# Patient Record
Sex: MALE | Race: BLACK OR AFRICAN AMERICAN | Employment: OTHER | URBAN - METROPOLITAN AREA
[De-identification: names, ages, dates, MRNs, and addresses within clinical notes are randomized per-mention and may not be internally consistent; named-entity substitution may affect disease eponyms.]

---

## 2020-03-15 ENCOUNTER — HOSPITAL ENCOUNTER (INPATIENT)
Age: 50
LOS: 3 days | Discharge: HOME | DRG: 506 | End: 2020-03-18
Attending: EMERGENCY MEDICINE | Admitting: FAMILY MEDICINE
Payer: MEDICARE

## 2020-03-15 ENCOUNTER — APPOINTMENT (OUTPATIENT)
Dept: GENERAL RADIOLOGY | Age: 50
DRG: 506 | End: 2020-03-15
Attending: EMERGENCY MEDICINE
Payer: MEDICARE

## 2020-03-15 ENCOUNTER — APPOINTMENT (OUTPATIENT)
Dept: MRI IMAGING | Age: 50
DRG: 506 | End: 2020-03-15
Attending: FAMILY MEDICINE
Payer: MEDICARE

## 2020-03-15 DIAGNOSIS — L03.012 FELON OF FINGER OF LEFT HAND: Primary | ICD-10-CM

## 2020-03-15 DIAGNOSIS — L08.9 LACERATION OF LEFT THUMB WITH INFECTION, INITIAL ENCOUNTER: ICD-10-CM

## 2020-03-15 DIAGNOSIS — M86.142 OTHER ACUTE OSTEOMYELITIS OF LEFT HAND (HCC): ICD-10-CM

## 2020-03-15 DIAGNOSIS — S61.012A LACERATION OF LEFT THUMB WITH INFECTION, INITIAL ENCOUNTER: ICD-10-CM

## 2020-03-15 PROBLEM — E78.5 HLD (HYPERLIPIDEMIA): Status: ACTIVE | Noted: 2020-03-15

## 2020-03-15 PROBLEM — F20.9 SCHIZOPHRENIA (HCC): Status: ACTIVE | Noted: 2020-03-15

## 2020-03-15 PROBLEM — B19.10 HBV (HEPATITIS B VIRUS) INFECTION: Status: ACTIVE | Noted: 2020-03-15

## 2020-03-15 LAB
ANION GAP SERPL CALC-SCNC: 5 MMOL/L (ref 7–16)
BASOPHILS # BLD: 0.1 K/UL (ref 0–0.2)
BASOPHILS NFR BLD: 1 % (ref 0–2)
BUN SERPL-MCNC: 8 MG/DL (ref 6–23)
CALCIUM SERPL-MCNC: 8.6 MG/DL (ref 8.3–10.4)
CHLORIDE SERPL-SCNC: 101 MMOL/L (ref 98–107)
CO2 SERPL-SCNC: 28 MMOL/L (ref 21–32)
CREAT SERPL-MCNC: 0.55 MG/DL (ref 0.8–1.5)
DIFFERENTIAL METHOD BLD: ABNORMAL
EOSINOPHIL # BLD: 0.1 K/UL (ref 0–0.8)
EOSINOPHIL NFR BLD: 1 % (ref 0.5–7.8)
ERYTHROCYTE [DISTWIDTH] IN BLOOD BY AUTOMATED COUNT: 14.5 % (ref 11.9–14.6)
GLUCOSE BLD STRIP.AUTO-MCNC: 210 MG/DL (ref 65–100)
GLUCOSE BLD STRIP.AUTO-MCNC: 326 MG/DL (ref 65–100)
GLUCOSE SERPL-MCNC: 133 MG/DL (ref 65–100)
HCT VFR BLD AUTO: 47.4 % (ref 41.1–50.3)
HGB BLD-MCNC: 16.3 G/DL (ref 13.6–17.2)
IMM GRANULOCYTES # BLD AUTO: 0.2 K/UL (ref 0–0.5)
IMM GRANULOCYTES NFR BLD AUTO: 2 % (ref 0–5)
INR PPP: 1
LACTATE SERPL-SCNC: 1.5 MMOL/L (ref 0.4–2)
LYMPHOCYTES # BLD: 3 K/UL (ref 0.5–4.6)
LYMPHOCYTES NFR BLD: 29 % (ref 13–44)
MCH RBC QN AUTO: 28.3 PG (ref 26.1–32.9)
MCHC RBC AUTO-ENTMCNC: 34.4 G/DL (ref 31.4–35)
MCV RBC AUTO: 82.3 FL (ref 79.6–97.8)
MONOCYTES # BLD: 1.1 K/UL (ref 0.1–1.3)
MONOCYTES NFR BLD: 11 % (ref 4–12)
NEUTS SEG # BLD: 5.8 K/UL (ref 1.7–8.2)
NEUTS SEG NFR BLD: 57 % (ref 43–78)
NRBC # BLD: 0 K/UL (ref 0–0.2)
PLATELET # BLD AUTO: 236 K/UL (ref 150–450)
PMV BLD AUTO: 10.4 FL (ref 9.4–12.3)
POTASSIUM SERPL-SCNC: 3.8 MMOL/L (ref 3.5–5.1)
PROTHROMBIN TIME: 13.4 SEC (ref 12–14.7)
RBC # BLD AUTO: 5.76 M/UL (ref 4.23–5.6)
SODIUM SERPL-SCNC: 134 MMOL/L (ref 136–145)
WBC # BLD AUTO: 10.2 K/UL (ref 4.3–11.1)

## 2020-03-15 PROCEDURE — 65270000029 HC RM PRIVATE

## 2020-03-15 PROCEDURE — 99283 EMERGENCY DEPT VISIT LOW MDM: CPT

## 2020-03-15 PROCEDURE — 87040 BLOOD CULTURE FOR BACTERIA: CPT

## 2020-03-15 PROCEDURE — 74011250636 HC RX REV CODE- 250/636: Performed by: EMERGENCY MEDICINE

## 2020-03-15 PROCEDURE — 80048 BASIC METABOLIC PNL TOTAL CA: CPT

## 2020-03-15 PROCEDURE — 87536 HIV-1 QUANT&REVRSE TRNSCRPJ: CPT

## 2020-03-15 PROCEDURE — 85025 COMPLETE CBC W/AUTO DIFF WBC: CPT

## 2020-03-15 PROCEDURE — 82962 GLUCOSE BLOOD TEST: CPT

## 2020-03-15 PROCEDURE — 96374 THER/PROPH/DIAG INJ IV PUSH: CPT

## 2020-03-15 PROCEDURE — 74011250636 HC RX REV CODE- 250/636

## 2020-03-15 PROCEDURE — 87186 SC STD MICRODIL/AGAR DIL: CPT

## 2020-03-15 PROCEDURE — 73140 X-RAY EXAM OF FINGER(S): CPT

## 2020-03-15 PROCEDURE — 83605 ASSAY OF LACTIC ACID: CPT

## 2020-03-15 PROCEDURE — 73223 MRI JOINT UPR EXTR W/O&W/DYE: CPT

## 2020-03-15 PROCEDURE — A9575 INJ GADOTERATE MEGLUMI 0.1ML: HCPCS

## 2020-03-15 PROCEDURE — 36415 COLL VENOUS BLD VENIPUNCTURE: CPT

## 2020-03-15 PROCEDURE — 85610 PROTHROMBIN TIME: CPT

## 2020-03-15 PROCEDURE — 74011250637 HC RX REV CODE- 250/637: Performed by: NURSE PRACTITIONER

## 2020-03-15 PROCEDURE — 87205 SMEAR GRAM STAIN: CPT

## 2020-03-15 PROCEDURE — 74011636637 HC RX REV CODE- 636/637: Performed by: FAMILY MEDICINE

## 2020-03-15 PROCEDURE — 87077 CULTURE AEROBIC IDENTIFY: CPT

## 2020-03-15 RX ORDER — OXCARBAZEPINE 300 MG/1
300 TABLET, FILM COATED ORAL EVERY 12 HOURS
COMMUNITY

## 2020-03-15 RX ORDER — INSULIN GLARGINE 100 [IU]/ML
20 INJECTION, SOLUTION SUBCUTANEOUS
Status: DISCONTINUED | OUTPATIENT
Start: 2020-03-15 | End: 2020-03-18 | Stop reason: HOSPADM

## 2020-03-15 RX ORDER — ATORVASTATIN CALCIUM 10 MG/1
10 TABLET, FILM COATED ORAL
Status: DISCONTINUED | OUTPATIENT
Start: 2020-03-15 | End: 2020-03-18 | Stop reason: HOSPADM

## 2020-03-15 RX ORDER — BENZTROPINE MESYLATE 1 MG/1
2 TABLET ORAL 2 TIMES DAILY
Status: DISCONTINUED | OUTPATIENT
Start: 2020-03-16 | End: 2020-03-15

## 2020-03-15 RX ORDER — EMTRICITABINE AND TENOFOVIR DISOPROXIL FUMARATE 200; 300 MG/1; MG/1
1 TABLET, FILM COATED ORAL DAILY
Status: DISCONTINUED | OUTPATIENT
Start: 2020-03-16 | End: 2020-03-18 | Stop reason: HOSPADM

## 2020-03-15 RX ORDER — TRAZODONE HYDROCHLORIDE 50 MG/1
150 TABLET ORAL
Status: DISCONTINUED | OUTPATIENT
Start: 2020-03-16 | End: 2020-03-15

## 2020-03-15 RX ORDER — GADOTERATE MEGLUMINE 376.9 MG/ML
15 INJECTION INTRAVENOUS
Status: ACTIVE | OUTPATIENT
Start: 2020-03-15 | End: 2020-03-16

## 2020-03-15 RX ORDER — LORATADINE 10 MG/1
10 TABLET ORAL
Status: DISCONTINUED | OUTPATIENT
Start: 2020-03-15 | End: 2020-03-18 | Stop reason: HOSPADM

## 2020-03-15 RX ORDER — TRAZODONE HYDROCHLORIDE 50 MG/1
50 TABLET ORAL
Status: DISCONTINUED | OUTPATIENT
Start: 2020-03-15 | End: 2020-03-15

## 2020-03-15 RX ORDER — IBUPROFEN 200 MG
1 TABLET ORAL EVERY 24 HOURS
Status: DISCONTINUED | OUTPATIENT
Start: 2020-03-15 | End: 2020-03-18 | Stop reason: HOSPADM

## 2020-03-15 RX ORDER — HALOPERIDOL 5 MG/1
5 TABLET ORAL DAILY
Status: DISCONTINUED | OUTPATIENT
Start: 2020-03-15 | End: 2020-03-18 | Stop reason: HOSPADM

## 2020-03-15 RX ORDER — BENZTROPINE MESYLATE 1 MG/1
2 TABLET ORAL 2 TIMES DAILY
Status: DISCONTINUED | OUTPATIENT
Start: 2020-03-15 | End: 2020-03-18 | Stop reason: HOSPADM

## 2020-03-15 RX ORDER — ATORVASTATIN CALCIUM 10 MG/1
40 TABLET, FILM COATED ORAL
COMMUNITY

## 2020-03-15 RX ORDER — HEPARIN SODIUM 5000 [USP'U]/ML
5000 INJECTION, SOLUTION INTRAVENOUS; SUBCUTANEOUS EVERY 8 HOURS
Status: DISCONTINUED | OUTPATIENT
Start: 2020-03-15 | End: 2020-03-18 | Stop reason: HOSPADM

## 2020-03-15 RX ORDER — HYDROCODONE BITARTRATE AND ACETAMINOPHEN 5; 325 MG/1; MG/1
1 TABLET ORAL ONCE
Status: COMPLETED | OUTPATIENT
Start: 2020-03-15 | End: 2020-03-15

## 2020-03-15 RX ORDER — SODIUM CHLORIDE 0.9 % (FLUSH) 0.9 %
5-40 SYRINGE (ML) INJECTION AS NEEDED
Status: DISCONTINUED | OUTPATIENT
Start: 2020-03-15 | End: 2020-03-18 | Stop reason: HOSPADM

## 2020-03-15 RX ORDER — TRAZODONE HYDROCHLORIDE 50 MG/1
150 TABLET ORAL
Status: DISCONTINUED | OUTPATIENT
Start: 2020-03-15 | End: 2020-03-18 | Stop reason: HOSPADM

## 2020-03-15 RX ORDER — VANCOMYCIN HYDROCHLORIDE 1 G/20ML
INJECTION, POWDER, LYOPHILIZED, FOR SOLUTION INTRAVENOUS EVERY 24 HOURS
Status: DISCONTINUED | OUTPATIENT
Start: 2020-03-15 | End: 2020-03-15 | Stop reason: DRUGHIGH

## 2020-03-15 RX ORDER — BENZTROPINE MESYLATE 2 MG/1
2 TABLET ORAL 2 TIMES DAILY
COMMUNITY

## 2020-03-15 RX ORDER — OLANZAPINE 20 MG/1
20 TABLET ORAL
COMMUNITY

## 2020-03-15 RX ORDER — OLANZAPINE 5 MG/1
20 TABLET ORAL
Status: DISCONTINUED | OUTPATIENT
Start: 2020-03-15 | End: 2020-03-18 | Stop reason: HOSPADM

## 2020-03-15 RX ORDER — SODIUM CHLORIDE 0.9 % (FLUSH) 0.9 %
10 SYRINGE (ML) INJECTION
Status: ACTIVE | OUTPATIENT
Start: 2020-03-15 | End: 2020-03-16

## 2020-03-15 RX ORDER — OXCARBAZEPINE 300 MG/1
300 TABLET, FILM COATED ORAL EVERY 12 HOURS
Status: DISCONTINUED | OUTPATIENT
Start: 2020-03-15 | End: 2020-03-18 | Stop reason: HOSPADM

## 2020-03-15 RX ORDER — SODIUM CHLORIDE 0.9 % (FLUSH) 0.9 %
5-40 SYRINGE (ML) INJECTION EVERY 8 HOURS
Status: DISCONTINUED | OUTPATIENT
Start: 2020-03-15 | End: 2020-03-18 | Stop reason: HOSPADM

## 2020-03-15 RX ORDER — HALOPERIDOL 5 MG/1
5 TABLET ORAL 3 TIMES DAILY
Status: DISCONTINUED | OUTPATIENT
Start: 2020-03-15 | End: 2020-03-15

## 2020-03-15 RX ORDER — HYDROCODONE BITARTRATE AND ACETAMINOPHEN 5; 325 MG/1; MG/1
1 TABLET ORAL
Status: DISCONTINUED | OUTPATIENT
Start: 2020-03-15 | End: 2020-03-18 | Stop reason: HOSPADM

## 2020-03-15 RX ORDER — VANCOMYCIN HYDROCHLORIDE
1250
Status: COMPLETED | OUTPATIENT
Start: 2020-03-15 | End: 2020-03-15

## 2020-03-15 RX ORDER — ACETAMINOPHEN 325 MG/1
650 TABLET ORAL
Status: DISCONTINUED | OUTPATIENT
Start: 2020-03-15 | End: 2020-03-18 | Stop reason: HOSPADM

## 2020-03-15 RX ORDER — VANCOMYCIN HYDROCHLORIDE
1250 EVERY 12 HOURS
Status: DISCONTINUED | OUTPATIENT
Start: 2020-03-16 | End: 2020-03-16

## 2020-03-15 RX ADMIN — HUMAN INSULIN 4 UNITS: 100 INJECTION, SOLUTION SUBCUTANEOUS at 17:47

## 2020-03-15 RX ADMIN — VANCOMYCIN HYDROCHLORIDE 1250 MG: 10 INJECTION, POWDER, LYOPHILIZED, FOR SOLUTION INTRAVENOUS at 15:24

## 2020-03-15 RX ADMIN — Medication 5 ML: at 17:44

## 2020-03-15 RX ADMIN — HYDROCODONE BITARTRATE AND ACETAMINOPHEN 1 TABLET: 5; 325 TABLET ORAL at 13:20

## 2020-03-15 NOTE — ED NOTES
TRANSFER - OUT REPORT:    Verbal report given to Ole Mclaughlin RN on Joe Khan  being transferred to [facility] for routine progression of care       Report consisted of patients Situation, Background, Assessment and   Recommendations(SBAR). Information from the following report(s) SBAR, ED Summary, [facility] and Recent Results was reviewed with the receiving nurse. Lines:   Peripheral IV 03/15/20 Right; Inner Antecubital (Active)   Site Assessment Clean, dry, & intact 3/15/2020  1:13 PM   Phlebitis Assessment 0 3/15/2020  1:13 PM   Infiltration Assessment 0 3/15/2020  1:13 PM   Dressing Status Clean, dry, & intact 3/15/2020  1:13 PM   Hub Color/Line Status Pink 3/15/2020  1:13 PM        Opportunity for questions and clarification was provided.       Patient transported with:   [employer]

## 2020-03-15 NOTE — PROGRESS NOTES
03/15/20 1956   Dual Skin Pressure Injury Assessment   Dual Skin Pressure Injury Assessment WDL   Second Care Provider (Based on [address] [place] [address]) Suresh Smith RN    Skin Integumentary   Skin Integumentary (WDL) X  (pt refused )   Skin Color Appropriate for ethnicity   Skin Condition/Temp Warm;Dry   Skin Integrity Other (comment); Wound (add Wound LDA)   Turgor Non-tenting   Hair Growth Present    Pressure  Injury Documentation   (unable to assess )   Wound Prevention and Protection Methods   Orientation of Wound Prevention Posterior   Location of Wound Prevention Sacrum/Coccyx   Dressing Present  No   Wound Offloading (Prevention Methods) Bed, pressure reduction mattress   Assessed only visible body parts.  Left thumb wound, scar to r shin, crack to right toe

## 2020-03-15 NOTE — H&P
History of Present Illness:  33-year-old male presenting to the emergency department with pain and skin breakdown at the distal first phalanx of his left hand after shutting his hand in the door several days ago. Patient denies fever, chills, nausea, vomiting, diarrhea. Patient does have a history of HIV and states that his viral load was last drawn 3 to 5 months ago and was \"in the thousands\". Patient states that he takes his HAART medication faithfully. Orthopedic surgery was consulted in the emergency department and requested an MRI of the patient's thumb and would plan for operative debridement pending the MRI results. Patient given IV vancomycin in the emergency department. Comprehensive review of systems negative unless stated above. Past Medical History:  HIV, unknown last viral load as I do not have access to records  HBV  T2DM  HLD  Schizophrenia    Past Surgical History:  Abdominal hernia repair    Family History:  Unknown    Social History:  Unemployed, single  Current every day smoker, 22-pack-year  Denies alcohol or illicit drug use    Physical Exam:  General: Middle aged black male, no acute distress  HEENT: NCAT, moist mucous membranes  Skin: See extremity exam as above  Cardio: RRR, normal S1/S2, no rubs, no gallops, no murmurs  Pulm: Non labored respirations on room air, LCAB, no wheezing, no rales, no rhonchi  GI: Soft, Nt, Nd, Nml bowel sounds, no masses noted  Extremity: Circumferentially denuded skin about the patient's distal 1st phalanx of left hand with surrounding erythema and purulent drainage, exposed bone  Neuro: Alert, oriented, moving all extremities, no focal deficits noted  Psych: Pleasant, cooperative, normal range of affect    I have personally reviewed all current data for this patient.     Assessment and Plan:    #Laceration of left thumb with cellulitis, osteomyelitis?:   -Consult orthopedic surgery  -Vancomycin IV  -MRI left thumb  -Follow wound cultures  -INR  -Daily labs  -NPO at midnight    #HIV: Continue home HAART, HIV viral load, CD4 count  #T2DM: Lantus and sliding scale insulin  #Schizophrenia: Continue home medications  #Tobacco abuse: Nicotine patch    Full Code    Inpatient for above    Heparin for VTE prevention    Please call [phone] for questions or concerns

## 2020-03-15 NOTE — ED PROVIDER NOTES
77year-old male with history of HIV, diabetes type 2, hypertension, as well as seizure disorder and bipolar disorder presents with infected left thumb. He tells me that 1 week ago he accidentally shot the front door on his left thumb injuring it. Since that time he has developed a weeping wound, the nail is loose, and he has moderate pain. Wound almost encircles the entire thumb. Texture varies from loose skin to abrasion to very hard texture to the palmar surface of the thumb. He tells me he has been draining purulent drainage at home currently he has serous drainage. He also tells me blood sugars have been around the 115 range at home. No fever           Past Medical History:   Diagnosis Date    Autoimmune disease ([facility]     HIV    Diabetes ([facility]     Hypertension     Infectious disease     HIV    Paranoid schizophrenia ([facility]     Psychiatric disorder     bipolar, paranoid,     Seizures ([facility]        Past Surgical History:   Procedure Laterality Date    ABDOMEN SURGERY PROC UNLISTED      hernia repair         History reviewed. No pertinent family history.     Social History     Socioeconomic History    Marital status: SINGLE     Spouse name: Not on file    Number of children: Not on file    Years of education: Not on file    Highest education level: Not on file   Occupational History    Not on file   Social Needs    Financial resource strain: Not on file    Food insecurity     Worry: Not on file     Inability: Not on file    Transportation needs     Medical: Not on file     Non-medical: Not on file   Tobacco Use    Smoking status: Current Every Day Smoker     Packs/day: 1.00     Years: 22.00     Pack years: 22.00   Substance and Sexual Activity    Alcohol use: No    Drug use: No    Sexual activity: Not on file   Lifestyle    Physical activity     Days per week: Not on file     Minutes per session: Not on file    Stress: Not on file   Relationships    Social connections     Talks on phone: Not on file     Gets together: Not on file     Attends Congregational service: Not on file     Active member of club or organization: Not on file     Attends meetings of clubs or organizations: Not on file     Relationship status: Not on file    Intimate partner violence     Fear of current or ex partner: Not on file     Emotionally abused: Not on file     Physically abused: Not on file     Forced sexual activity: Not on file   Other Topics Concern    Not on file   Social History Narrative    Not on file         ALLERGIES: Thorazine [chlorpromazine]    Review of Systems   Constitutional: Negative for chills and fever. HENT: Negative for facial swelling and mouth sores. Eyes: Negative for discharge and redness. Respiratory: Negative for cough and shortness of breath. Cardiovascular: Negative for chest pain and palpitations. Gastrointestinal: Negative for nausea and vomiting. Genitourinary: Negative for difficulty urinating. Musculoskeletal: Positive for joint swelling. Negative for myalgias. Skin: Positive for color change and wound. Neurological: Negative for dizziness and numbness. Psychiatric/Behavioral: Negative for confusion and decreased concentration. Vitals:    03/15/20 0926   BP: 127/86   Pulse: 90   Resp: 16   Temp: 97.9 °F (36.6 °C)   SpO2: 100%   Weight: 74.8 kg (165 lb)   Height: 5' 6\" (1.676 m)            Physical Exam  Vitals signs and nursing note reviewed. Constitutional:       Appearance: Normal appearance. HENT:      Head: Normocephalic and atraumatic. Nose: Nose normal.      Mouth/Throat:      Mouth: Mucous membranes are moist.   Eyes:      Extraocular Movements: Extraocular movements intact. Pupils: Pupils are equal, round, and reactive to light. Neck:      Musculoskeletal: Normal range of motion. Cardiovascular:      Rate and Rhythm: Regular rhythm. Heart sounds: Normal heart sounds.    Pulmonary:      Effort: Pulmonary effort is normal. Breath sounds: Normal breath sounds. Musculoskeletal:         General: Swelling, tenderness and signs of injury present. Left hand: He exhibits decreased capillary refill. Hands:    Skin:     General: Skin is warm and dry. Neurological:      General: No focal deficit present. Mental Status: He is alert and oriented to person, place, and time. Psychiatric:         Mood and Affect: Mood normal.         Behavior: Behavior normal.         Thought Content: Thought content normal.         Judgment: Judgment normal.                            MDM  Number of Diagnoses or Management Options  Diagnosis management comments: X-ray result is still pending. Will obtain baseline blood work and include blood cultures. As well will include lactic acid provide pain control.        Amount and/or Complexity of Data Reviewed  Clinical lab tests: ordered  Tests in the radiology section of CPT®: ordered      ED Course as of Mar 15 1400   Sun Mar 15, 2020   1400 Case was discussed with the orthopedic doctor on call and he requested the patient be admitted for IV antibiotics and he will have a hand specialist come and see him in the hospital tomorrow morning.    [KH]      ED Course User Index  [KH] Noel Mccall, DO       Procedures

## 2020-03-15 NOTE — ED TRIAGE NOTES
Patient complains of pain to left thumb that he was not seen for after he injured it last week. Patient states he has had some drainage from the area but denies any fever at home.

## 2020-03-16 ENCOUNTER — ANESTHESIA (OUTPATIENT)
Dept: SURGERY | Age: 50
DRG: 506 | End: 2020-03-16
Payer: MEDICARE

## 2020-03-16 ENCOUNTER — ANESTHESIA EVENT (OUTPATIENT)
Dept: SURGERY | Age: 50
DRG: 506 | End: 2020-03-16
Payer: MEDICARE

## 2020-03-16 LAB
ANION GAP SERPL CALC-SCNC: 5 MMOL/L (ref 7–16)
BASOPHILS # BLD: 0.1 K/UL (ref 0–0.2)
BASOPHILS NFR BLD: 1 % (ref 0–2)
BUN SERPL-MCNC: 11 MG/DL (ref 6–23)
CALCIUM SERPL-MCNC: 8.3 MG/DL (ref 8.3–10.4)
CHLORIDE SERPL-SCNC: 106 MMOL/L (ref 98–107)
CO2 SERPL-SCNC: 27 MMOL/L (ref 21–32)
CREAT SERPL-MCNC: 0.68 MG/DL (ref 0.8–1.5)
DIFFERENTIAL METHOD BLD: ABNORMAL
EOSINOPHIL # BLD: 0.1 K/UL (ref 0–0.8)
EOSINOPHIL NFR BLD: 1 % (ref 0.5–7.8)
ERYTHROCYTE [DISTWIDTH] IN BLOOD BY AUTOMATED COUNT: 13.7 % (ref 11.9–14.6)
GLUCOSE BLD STRIP.AUTO-MCNC: 104 MG/DL (ref 65–100)
GLUCOSE BLD STRIP.AUTO-MCNC: 111 MG/DL (ref 65–100)
GLUCOSE BLD STRIP.AUTO-MCNC: 161 MG/DL (ref 65–100)
GLUCOSE BLD STRIP.AUTO-MCNC: 243 MG/DL (ref 65–100)
GLUCOSE BLD STRIP.AUTO-MCNC: 250 MG/DL (ref 65–100)
GLUCOSE BLD STRIP.AUTO-MCNC: 90 MG/DL (ref 65–100)
GLUCOSE SERPL-MCNC: 275 MG/DL (ref 65–100)
HCT VFR BLD AUTO: 43.6 % (ref 41.1–50.3)
HGB BLD-MCNC: 14.6 G/DL (ref 13.6–17.2)
IMM GRANULOCYTES # BLD AUTO: 0.1 K/UL (ref 0–0.5)
IMM GRANULOCYTES NFR BLD AUTO: 1 % (ref 0–5)
LYMPHOCYTES # BLD: 2.3 K/UL (ref 0.5–4.6)
LYMPHOCYTES NFR BLD: 27 % (ref 13–44)
MCH RBC QN AUTO: 27.4 PG (ref 26.1–32.9)
MCHC RBC AUTO-ENTMCNC: 33.5 G/DL (ref 31.4–35)
MCV RBC AUTO: 82 FL (ref 79.6–97.8)
MONOCYTES # BLD: 0.9 K/UL (ref 0.1–1.3)
MONOCYTES NFR BLD: 11 % (ref 4–12)
NEUTS SEG # BLD: 5.2 K/UL (ref 1.7–8.2)
NEUTS SEG NFR BLD: 60 % (ref 43–78)
NRBC # BLD: 0 K/UL (ref 0–0.2)
PLATELET # BLD AUTO: 215 K/UL (ref 150–450)
PMV BLD AUTO: 9.3 FL (ref 9.4–12.3)
POTASSIUM SERPL-SCNC: 4.1 MMOL/L (ref 3.5–5.1)
RBC # BLD AUTO: 5.32 M/UL (ref 4.23–5.6)
SODIUM SERPL-SCNC: 138 MMOL/L (ref 136–145)
WBC # BLD AUTO: 8.6 K/UL (ref 4.3–11.1)

## 2020-03-16 PROCEDURE — 74011250636 HC RX REV CODE- 250/636: Performed by: NURSE ANESTHETIST, CERTIFIED REGISTERED

## 2020-03-16 PROCEDURE — 0J9K0ZZ DRAINAGE OF LEFT HAND SUBCUTANEOUS TISSUE AND FASCIA, OPEN APPROACH: ICD-10-PCS | Performed by: ORTHOPAEDIC SURGERY

## 2020-03-16 PROCEDURE — 85025 COMPLETE CBC W/AUTO DIFF WBC: CPT

## 2020-03-16 PROCEDURE — 65270000029 HC RM PRIVATE

## 2020-03-16 PROCEDURE — 74011250637 HC RX REV CODE- 250/637: Performed by: INTERNAL MEDICINE

## 2020-03-16 PROCEDURE — 0R9X0ZZ DRAINAGE OF LEFT FINGER PHALANGEAL JOINT, OPEN APPROACH: ICD-10-PCS | Performed by: ORTHOPAEDIC SURGERY

## 2020-03-16 PROCEDURE — 74011000250 HC RX REV CODE- 250: Performed by: ORTHOPAEDIC SURGERY

## 2020-03-16 PROCEDURE — 74011000250 HC RX REV CODE- 250: Performed by: NURSE ANESTHETIST, CERTIFIED REGISTERED

## 2020-03-16 PROCEDURE — 94760 N-INVAS EAR/PLS OXIMETRY 1: CPT

## 2020-03-16 PROCEDURE — 77030040361 HC SLV COMPR DVT MDII -B: Performed by: ORTHOPAEDIC SURGERY

## 2020-03-16 PROCEDURE — 74011250636 HC RX REV CODE- 250/636: Performed by: ORTHOPAEDIC SURGERY

## 2020-03-16 PROCEDURE — 77010033678 HC OXYGEN DAILY

## 2020-03-16 PROCEDURE — 74011250636 HC RX REV CODE- 250/636: Performed by: ANESTHESIOLOGY

## 2020-03-16 PROCEDURE — 87075 CULTR BACTERIA EXCEPT BLOOD: CPT

## 2020-03-16 PROCEDURE — 36415 COLL VENOUS BLD VENIPUNCTURE: CPT

## 2020-03-16 PROCEDURE — 0HBQXZZ EXCISION OF FINGER NAIL, EXTERNAL APPROACH: ICD-10-PCS | Performed by: ORTHOPAEDIC SURGERY

## 2020-03-16 PROCEDURE — 74011250636 HC RX REV CODE- 250/636: Performed by: INTERNAL MEDICINE

## 2020-03-16 PROCEDURE — 0JBK0ZZ EXCISION OF LEFT HAND SUBCUTANEOUS TISSUE AND FASCIA, OPEN APPROACH: ICD-10-PCS | Performed by: ORTHOPAEDIC SURGERY

## 2020-03-16 PROCEDURE — 77030019908 HC STETH ESOPH SIMS -A: Performed by: ANESTHESIOLOGY

## 2020-03-16 PROCEDURE — 74011250636 HC RX REV CODE- 250/636: Performed by: FAMILY MEDICINE

## 2020-03-16 PROCEDURE — 86361 T CELL ABSOLUTE COUNT: CPT

## 2020-03-16 PROCEDURE — 77030000032 HC CUF TRNQT ZIMM -B: Performed by: ORTHOPAEDIC SURGERY

## 2020-03-16 PROCEDURE — 87205 SMEAR GRAM STAIN: CPT

## 2020-03-16 PROCEDURE — 74011636637 HC RX REV CODE- 636/637: Performed by: FAMILY MEDICINE

## 2020-03-16 PROCEDURE — 77030002888 HC SUT CHRMC J&J -A: Performed by: ORTHOPAEDIC SURGERY

## 2020-03-16 PROCEDURE — 76060000033 HC ANESTHESIA 1 TO 1.5 HR: Performed by: ORTHOPAEDIC SURGERY

## 2020-03-16 PROCEDURE — 74011000258 HC RX REV CODE- 258: Performed by: INTERNAL MEDICINE

## 2020-03-16 PROCEDURE — 76210000006 HC OR PH I REC 0.5 TO 1 HR: Performed by: ORTHOPAEDIC SURGERY

## 2020-03-16 PROCEDURE — 80048 BASIC METABOLIC PNL TOTAL CA: CPT

## 2020-03-16 PROCEDURE — 77030010509 HC AIRWY LMA MSK TELE -A: Performed by: ANESTHESIOLOGY

## 2020-03-16 PROCEDURE — 82962 GLUCOSE BLOOD TEST: CPT

## 2020-03-16 PROCEDURE — 74011250637 HC RX REV CODE- 250/637: Performed by: FAMILY MEDICINE

## 2020-03-16 PROCEDURE — 76010000138 HC OR TIME 0.5 TO 1 HR: Performed by: ORTHOPAEDIC SURGERY

## 2020-03-16 RX ORDER — FLUMAZENIL 0.1 MG/ML
0.2 INJECTION INTRAVENOUS
Status: DISCONTINUED | OUTPATIENT
Start: 2020-03-16 | End: 2020-03-16 | Stop reason: HOSPADM

## 2020-03-16 RX ORDER — PROPOFOL 10 MG/ML
INJECTION, EMULSION INTRAVENOUS AS NEEDED
Status: DISCONTINUED | OUTPATIENT
Start: 2020-03-16 | End: 2020-03-16 | Stop reason: HOSPADM

## 2020-03-16 RX ORDER — SODIUM CHLORIDE, SODIUM LACTATE, POTASSIUM CHLORIDE, CALCIUM CHLORIDE 600; 310; 30; 20 MG/100ML; MG/100ML; MG/100ML; MG/100ML
INJECTION, SOLUTION INTRAVENOUS
Status: DISCONTINUED | OUTPATIENT
Start: 2020-03-16 | End: 2020-03-16 | Stop reason: HOSPADM

## 2020-03-16 RX ORDER — ONDANSETRON 2 MG/ML
INJECTION INTRAMUSCULAR; INTRAVENOUS AS NEEDED
Status: DISCONTINUED | OUTPATIENT
Start: 2020-03-16 | End: 2020-03-16 | Stop reason: HOSPADM

## 2020-03-16 RX ORDER — OXYCODONE HYDROCHLORIDE 5 MG/1
5 TABLET ORAL
Status: DISCONTINUED | OUTPATIENT
Start: 2020-03-16 | End: 2020-03-16 | Stop reason: HOSPADM

## 2020-03-16 RX ORDER — LIDOCAINE HYDROCHLORIDE 20 MG/ML
INJECTION, SOLUTION EPIDURAL; INFILTRATION; INTRACAUDAL; PERINEURAL AS NEEDED
Status: DISCONTINUED | OUTPATIENT
Start: 2020-03-16 | End: 2020-03-16 | Stop reason: HOSPADM

## 2020-03-16 RX ORDER — NALOXONE HYDROCHLORIDE 0.4 MG/ML
0.1 INJECTION, SOLUTION INTRAMUSCULAR; INTRAVENOUS; SUBCUTANEOUS AS NEEDED
Status: DISCONTINUED | OUTPATIENT
Start: 2020-03-16 | End: 2020-03-16 | Stop reason: HOSPADM

## 2020-03-16 RX ORDER — HYDROMORPHONE HYDROCHLORIDE 2 MG/ML
0.5 INJECTION, SOLUTION INTRAMUSCULAR; INTRAVENOUS; SUBCUTANEOUS
Status: DISCONTINUED | OUTPATIENT
Start: 2020-03-16 | End: 2020-03-16 | Stop reason: HOSPADM

## 2020-03-16 RX ORDER — SODIUM CHLORIDE, SODIUM LACTATE, POTASSIUM CHLORIDE, CALCIUM CHLORIDE 600; 310; 30; 20 MG/100ML; MG/100ML; MG/100ML; MG/100ML
25 INJECTION, SOLUTION INTRAVENOUS CONTINUOUS
Status: DISCONTINUED | OUTPATIENT
Start: 2020-03-16 | End: 2020-03-16 | Stop reason: HOSPADM

## 2020-03-16 RX ORDER — SODIUM CHLORIDE, SODIUM LACTATE, POTASSIUM CHLORIDE, CALCIUM CHLORIDE 600; 310; 30; 20 MG/100ML; MG/100ML; MG/100ML; MG/100ML
75 INJECTION, SOLUTION INTRAVENOUS CONTINUOUS
Status: DISCONTINUED | OUTPATIENT
Start: 2020-03-16 | End: 2020-03-16 | Stop reason: HOSPADM

## 2020-03-16 RX ORDER — FENTANYL CITRATE 50 UG/ML
INJECTION, SOLUTION INTRAMUSCULAR; INTRAVENOUS AS NEEDED
Status: DISCONTINUED | OUTPATIENT
Start: 2020-03-16 | End: 2020-03-16 | Stop reason: HOSPADM

## 2020-03-16 RX ORDER — DIPHENHYDRAMINE HYDROCHLORIDE 50 MG/ML
12.5 INJECTION, SOLUTION INTRAMUSCULAR; INTRAVENOUS
Status: DISCONTINUED | OUTPATIENT
Start: 2020-03-16 | End: 2020-03-16 | Stop reason: HOSPADM

## 2020-03-16 RX ORDER — VANCOMYCIN 1.75 GRAM/500 ML IN 0.9 % SODIUM CHLORIDE INTRAVENOUS
1750 EVERY 12 HOURS
Status: DISCONTINUED | OUTPATIENT
Start: 2020-03-16 | End: 2020-03-18

## 2020-03-16 RX ADMIN — PIPERACILLIN AND TAZOBACTAM 3.38 G: 3; .375 INJECTION, POWDER, FOR SOLUTION INTRAVENOUS at 11:22

## 2020-03-16 RX ADMIN — OXCARBAZEPINE 300 MG: 300 TABLET, FILM COATED ORAL at 21:28

## 2020-03-16 RX ADMIN — OXCARBAZEPINE 300 MG: 300 TABLET, FILM COATED ORAL at 11:20

## 2020-03-16 RX ADMIN — TRAZODONE HYDROCHLORIDE 150 MG: 50 TABLET ORAL at 21:28

## 2020-03-16 RX ADMIN — TRAZODONE HYDROCHLORIDE 150 MG: 50 TABLET ORAL at 00:32

## 2020-03-16 RX ADMIN — OXCARBAZEPINE 300 MG: 300 TABLET, FILM COATED ORAL at 00:32

## 2020-03-16 RX ADMIN — FENTANYL CITRATE 25 MCG: 50 INJECTION INTRAMUSCULAR; INTRAVENOUS at 17:46

## 2020-03-16 RX ADMIN — PIPERACILLIN AND TAZOBACTAM 3.38 G: 3; .375 INJECTION, POWDER, FOR SOLUTION INTRAVENOUS at 17:00

## 2020-03-16 RX ADMIN — ATORVASTATIN CALCIUM 10 MG: 10 TABLET, FILM COATED ORAL at 21:28

## 2020-03-16 RX ADMIN — BENZTROPINE MESYLATE 2 MG: 1 TABLET ORAL at 00:33

## 2020-03-16 RX ADMIN — HYDROMORPHONE HYDROCHLORIDE 0.5 MG: 2 INJECTION INTRAMUSCULAR; INTRAVENOUS; SUBCUTANEOUS at 18:24

## 2020-03-16 RX ADMIN — Medication 1 AMPULE: at 11:20

## 2020-03-16 RX ADMIN — ATORVASTATIN CALCIUM 10 MG: 10 TABLET, FILM COATED ORAL at 00:32

## 2020-03-16 RX ADMIN — Medication 5 ML: at 04:49

## 2020-03-16 RX ADMIN — LIDOCAINE HYDROCHLORIDE 100 MG: 20 INJECTION, SOLUTION EPIDURAL; INFILTRATION; INTRACAUDAL; PERINEURAL at 17:15

## 2020-03-16 RX ADMIN — PROPOFOL 200 MG: 10 INJECTION, EMULSION INTRAVENOUS at 17:15

## 2020-03-16 RX ADMIN — Medication 10 ML: at 21:29

## 2020-03-16 RX ADMIN — OLANZAPINE 20 MG: 5 TABLET, FILM COATED ORAL at 21:28

## 2020-03-16 RX ADMIN — INSULIN GLARGINE 20 UNITS: 100 INJECTION, SOLUTION SUBCUTANEOUS at 21:32

## 2020-03-16 RX ADMIN — BENZTROPINE MESYLATE 2 MG: 1 TABLET ORAL at 11:19

## 2020-03-16 RX ADMIN — Medication 5 ML: at 00:34

## 2020-03-16 RX ADMIN — HEPARIN SODIUM 5000 UNITS: 5000 INJECTION INTRAVENOUS; SUBCUTANEOUS at 21:29

## 2020-03-16 RX ADMIN — OLANZAPINE 20 MG: 5 TABLET, FILM COATED ORAL at 00:33

## 2020-03-16 RX ADMIN — SODIUM CHLORIDE, SODIUM LACTATE, POTASSIUM CHLORIDE, AND CALCIUM CHLORIDE 25 ML/HR: 600; 310; 30; 20 INJECTION, SOLUTION INTRAVENOUS at 17:03

## 2020-03-16 RX ADMIN — HUMAN INSULIN 6 UNITS: 100 INJECTION, SOLUTION SUBCUTANEOUS at 21:33

## 2020-03-16 RX ADMIN — HYDROCODONE BITARTRATE AND ACETAMINOPHEN 1 TABLET: 5; 325 TABLET ORAL at 12:16

## 2020-03-16 RX ADMIN — HYDROCODONE BITARTRATE AND ACETAMINOPHEN 1 TABLET: 5; 325 TABLET ORAL at 00:33

## 2020-03-16 RX ADMIN — VANCOMYCIN HYDROCHLORIDE 1250 MG: 10 INJECTION, POWDER, LYOPHILIZED, FOR SOLUTION INTRAVENOUS at 04:50

## 2020-03-16 RX ADMIN — FENTANYL CITRATE 50 MCG: 50 INJECTION INTRAMUSCULAR; INTRAVENOUS at 17:25

## 2020-03-16 RX ADMIN — VANCOMYCIN HYDROCHLORIDE 1750 MG: 10 INJECTION, POWDER, LYOPHILIZED, FOR SOLUTION INTRAVENOUS at 16:54

## 2020-03-16 RX ADMIN — HUMAN INSULIN 8 UNITS: 100 INJECTION, SOLUTION SUBCUTANEOUS at 00:28

## 2020-03-16 RX ADMIN — ONDANSETRON 4 MG: 2 INJECTION INTRAMUSCULAR; INTRAVENOUS at 17:46

## 2020-03-16 RX ADMIN — FENTANYL CITRATE 25 MCG: 50 INJECTION INTRAMUSCULAR; INTRAVENOUS at 17:50

## 2020-03-16 RX ADMIN — INSULIN GLARGINE 20 UNITS: 100 INJECTION, SOLUTION SUBCUTANEOUS at 00:27

## 2020-03-16 RX ADMIN — HEPARIN SODIUM 5000 UNITS: 5000 INJECTION INTRAVENOUS; SUBCUTANEOUS at 00:27

## 2020-03-16 RX ADMIN — BENZTROPINE MESYLATE 2 MG: 1 TABLET ORAL at 21:28

## 2020-03-16 RX ADMIN — Medication 1 AMPULE: at 00:27

## 2020-03-16 RX ADMIN — EMTRICITABINE AND TENOFOVIR DISOPROXIL FUMARATE 1 TABLET: 200; 300 TABLET, FILM COATED ORAL at 09:00

## 2020-03-16 RX ADMIN — Medication 1 AMPULE: at 21:28

## 2020-03-16 RX ADMIN — SODIUM CHLORIDE, SODIUM LACTATE, POTASSIUM CHLORIDE, AND CALCIUM CHLORIDE: 600; 310; 30; 20 INJECTION, SOLUTION INTRAVENOUS at 17:08

## 2020-03-16 NOTE — ANESTHESIA PREPROCEDURE EVALUATION
Relevant Problems   No relevant active problems       Anesthetic History   No history of anesthetic complications            Review of Systems / Medical History  Patient summary reviewed and pertinent labs reviewed    Pulmonary          Smoker         Neuro/Psych     seizures    Psychiatric history (Schizophrenia, bipolar)     Cardiovascular    Hypertension              Exercise tolerance: >4 METS  Comments: He reports a murmur - asymptomatic    GI/Hepatic/Renal           Liver disease (HBV)     Endo/Other    Diabetes         Other Findings   Comments: HIV           Physical Exam    Airway  Mallampati: II  TM Distance: 4 - 6 cm  Neck ROM: normal range of motion   Mouth opening: Normal     Cardiovascular    Rhythm: regular  Rate: normal         Dental  No notable dental hx       Pulmonary  Breath sounds clear to auscultation               Abdominal  GI exam deferred       Other Findings            Anesthetic Plan    ASA: 2  Anesthesia type: general  LMA        Induction: Intravenous  Anesthetic plan and risks discussed with: Patient      Infection runs up into wrist and plan to use tourniquet.  Will plan on general with LMA

## 2020-03-16 NOTE — INTERDISCIPLINARY ROUNDS
Interdisciplinary team rounds were held 3/16/2020 with the following team members:  Care Management, Physical Therapy, Physician and . Plan of care discussed. See clinical pathway and/or care plan for interventions and desired outcomes.

## 2020-03-16 NOTE — CONSULTS
FRACTURE CONSULT NOTE    Subjective:     Date of Consultation:  March 16, 2020    Catie Mcmahon is a 44 y.o. male who is being seen for left thumb infection. Patient reports a symptom started 2 weeks ago when he jammed his left thumb in a door, he initially thought this was just a small injury that would heal itself but he later developed drainage from the wound and sloughing of the skin. He was admitted last night for IV antibiotic treatment and an MRI was obtained which also demonstrated changes suggestive of osteomyelitis of the distal phalanx. Patient Active Problem List    Diagnosis Date Noted    Laceration of left thumb with infection 03/15/2020    Schizophrenia ([facility] 03/15/2020    HBV (hepatitis B virus) infection 03/15/2020    HLD (hyperlipidemia) 03/15/2020    Diabetes ([facility] 06/04/2014    HIV (human immunodeficiency virus infection) ([facility] 06/04/2014     History reviewed. No pertinent family history. Social History     Tobacco Use    Smoking status: Current Every Day Smoker     Packs/day: 1.00     Years: 22.00     Pack years: 22.00   Substance Use Topics    Alcohol use: No     Past Medical History:   Diagnosis Date    Autoimmune disease ([facility]     HIV    Diabetes ([facility]     Hypertension     Infectious disease     HIV    Paranoid schizophrenia ([facility]     Psychiatric disorder     bipolar, paranoid,     [facility]izures ([facility]       Past Surgical History:   Procedure Laterality Date    ABDOMEN SURGERY PROC UNLISTED      hernia repair      Prior to Admission medications    Medication Sig Start Date End Date Taking? Authorizing Provider   OXcarbazepine (TRILEPTAL) 300 mg tablet Take 300 mg by mouth every twelve (12) hours. Yes Provider, Historical   atorvastatin (LIPITOR) 10 mg tablet Take 10 mg by mouth nightly. Yes Provider, Historical   benztropine (COGENTIN) 2 mg tablet Take 2 mg by mouth two (2) times a day.    Yes Provider, Historical   OLANZapine (ZyPREXA) 20 mg tablet Take 20 mg by mouth nightly. Yes Provider, Historical   insulin detemir (LEVEMIR) 100 unit/mL injection 30 Units by SubCUTAneous route daily. Yes Other, MD Dixie   emtricitabine-tenofovir (TRUVADA) 200-300 mg per tablet Take 1 Tab by mouth daily. Yes Other, MD Dixie   TRAZODONE HCL (TRAZODONE PO) Take 300 mg by mouth. Unknown dose    Yes Other, MD Dixie   haloperidol (HALDOL) 5 mg tablet Take 5 mg by mouth nightly. 4/9/11  Yes Other, MD Dixie   METFORMIN HCL (GLUCOPHAGE PO) Take 1,000 mg by mouth two (2) times a day. Yes Other, MD Dixie   Cetirizine (ZYRTEC) 10 mg cap Take  by mouth daily as needed.     Provider, Historical     Current Facility-Administered Medications   Medication Dose Route Frequency    piperacillin-tazobactam (ZOSYN) 3.375 g in 0.9% sodium chloride (MBP/ADV) 100 mL  3.375 g IntraVENous Q8H    vancomycin (VANCOCIN) 1750 mg in  ml infusion  1,750 mg IntraVENous Q12H    sodium chloride (NS) flush 5-40 mL  5-40 mL IntraVENous Q8H    sodium chloride (NS) flush 5-40 mL  5-40 mL IntraVENous PRN    acetaminophen (TYLENOL) tablet 650 mg  650 mg Oral Q4H PRN    heparin (porcine) injection 5,000 Units  5,000 Units SubCUTAneous Q8H    insulin glargine (LANTUS) injection 20 Units  20 Units SubCUTAneous QHS    insulin regular (NOVOLIN R, HUMULIN R) injection   SubCUTAneous AC&HS    nicotine (NICODERM CQ) 21 mg/24 hr patch 1 Patch  1 Patch TransDERmal Q24H    emtricitabine-tenofovir (TDF) (TRUVADA) 200-300 mg per tablet 1 Tab  1 Tab Oral DAILY    alcohol 62% (NOZIN) nasal  1 Ampule  1 Ampule Topical Q12H    [Held by provider] haloperidoL (HALDOL) tablet 5 mg  5 mg Oral DAILY    HYDROcodone-acetaminophen (NORCO) 5-325 mg per tablet 1 Tab  1 Tab Oral Q6H PRN    atorvastatin (LIPITOR) tablet 10 mg  10 mg Oral QHS    loratadine (CLARITIN) tablet 10 mg  10 mg Oral DAILY PRN    OLANZapine (ZyPREXA) tablet 20 mg  20 mg Oral QHS    OXcarbazepine (TRILEPTAL) tablet 300 mg  300 mg Oral Q12H    benztropine (COGENTIN) tablet 2 mg  2 mg Oral BID    traZODone (DESYREL) tablet 150 mg  150 mg Oral QHS      Allergies   Allergen Reactions    Thorazine [Chlorpromazine] Rash        Review of Systems:  A comprehensive review of systems was negative except for that written in the HPI. Mental Status: no dementia    Objective:     Patient Vitals for the past 8 hrs:   BP Temp Pulse Resp SpO2   20 1154 131/76 97.9 °F (36.6 °C) 66 18 97 %   20 0744 145/82 98.1 °F (36.7 °C) 78 18 98 %     Temp (24hrs), Av.1 °F (36.7 °C), Min:97.4 °F (36.3 °C), Max:98.5 °F (36.9 °C)      EXAM:     Examination of the left upper extremity demonstrates normal sensation median, ulnar and radial diffusion. He has significant swelling of the left thumb, there is significant skin sloughing and purulent drainage from a dorsal wound located over the left thumb IP joint.     Imaging Review: MRI of the left thumb was reviewed and demonstrates increased T2 signal along the distal phalanx suggestive of osteomyelitis given the clinical findings    Labs:   Recent Results (from the past 24 hour(s))   CULTURE, BLOOD    Collection Time: 03/15/20  2:20 PM   Result Value Ref Range    Special Requests: LEFT  Antecubital        Culture result: NO GROWTH AFTER 15 HOURS     METABOLIC PANEL, BASIC    Collection Time: 03/15/20  2:20 PM   Result Value Ref Range    Sodium 134 (L) 136 - 145 mmol/L    Potassium 3.8 3.5 - 5.1 mmol/L    Chloride 101 98 - 107 mmol/L    CO2 28 21 - 32 mmol/L    Anion gap 5 (L) 7 - 16 mmol/L    Glucose 133 (H) 65 - 100 mg/dL    BUN 8 6 - 23 MG/DL    Creatinine 0.55 (L) 0.8 - 1.5 MG/DL    GFR est AA >60 >60 ml/min/1.73m2    GFR est non-AA >60 >60 ml/min/1.73m2    Calcium 8.6 8.3 - 10.4 MG/DL   PROTHROMBIN TIME + INR    Collection Time: 03/15/20  3:30 PM   Result Value Ref Range    Prothrombin time 13.4 12.0 - 14.7 sec    INR 1.0     GLUCOSE, POC    Collection Time: 03/15/20  5:38 PM   Result Value Ref Range    Glucose (POC) 210 (H) 65 - 100 mg/dL   GLUCOSE, POC    Collection Time: 03/15/20 11:29 PM   Result Value Ref Range    Glucose (POC) 326 (H) 65 - 035 mg/dL   METABOLIC PANEL, BASIC    Collection Time: 03/16/20  6:41 AM   Result Value Ref Range    Sodium 138 136 - 145 mmol/L    Potassium 4.1 3.5 - 5.1 mmol/L    Chloride 106 98 - 107 mmol/L    CO2 27 21 - 32 mmol/L    Anion gap 5 (L) 7 - 16 mmol/L    Glucose 275 (H) 65 - 100 mg/dL    BUN 11 6 - 23 MG/DL    Creatinine 0.68 (L) 0.8 - 1.5 MG/DL    GFR est AA >60 >60 ml/min/1.73m2    GFR est non-AA >60 >60 ml/min/1.73m2    Calcium 8.3 8.3 - 10.4 MG/DL   CBC WITH AUTOMATED DIFF    Collection Time: 03/16/20  6:41 AM   Result Value Ref Range    WBC 8.6 4.3 - 11.1 K/uL    RBC 5.32 4.23 - 5.6 M/uL    HGB 14.6 13.6 - 17.2 g/dL    HCT 43.6 41.1 - 50.3 %    MCV 82.0 79.6 - 97.8 FL    MCH 27.4 26.1 - 32.9 PG    MCHC 33.5 31.4 - 35.0 g/dL    RDW 13.7 11.9 - 14.6 %    PLATELET 612 775 - 870 K/uL    MPV 9.3 (L) 9.4 - 12.3 FL    ABSOLUTE NRBC 0.00 0.0 - 0.2 K/uL    DF AUTOMATED      NEUTROPHILS 60 43 - 78 %    LYMPHOCYTES 27 13 - 44 %    MONOCYTES 11 4.0 - 12.0 %    EOSINOPHILS 1 0.5 - 7.8 %    BASOPHILS 1 0.0 - 2.0 %    IMMATURE GRANULOCYTES 1 0.0 - 5.0 %    ABS. NEUTROPHILS 5.2 1.7 - 8.2 K/UL    ABS. LYMPHOCYTES 2.3 0.5 - 4.6 K/UL    ABS. MONOCYTES 0.9 0.1 - 1.3 K/UL    ABS. EOSINOPHILS 0.1 0.0 - 0.8 K/UL    ABS. BASOPHILS 0.1 0.0 - 0.2 K/UL    ABS. IMM.  GRANS. 0.1 0.0 - 0.5 K/UL   GLUCOSE, POC    Collection Time: 03/16/20  7:15 AM   Result Value Ref Range    Glucose (POC) 243 (H) 65 - 100 mg/dL   GLUCOSE, POC    Collection Time: 03/16/20 11:11 AM   Result Value Ref Range    Glucose (POC) 161 (H) 65 - 100 mg/dL         Impression:     Principal Problem:    Laceration of left thumb with infection (3/15/2020)    Active Problems:    Di[facility] ([facility] (6/4/2014)      HIV (human immunodeficiency virus infection) ([facility] (6/4/2014)      Schizophrenia ([facility] (3/15/2020)      HBV (hepatitis B virus) infection (3/15/2020)      HLD (hyperlipidemia) (3/15/2020)        Plan:     - Plan for left thumb irrigation debridement today, continue IV antibiotics, will take cultures for ID guided antibiotic therapy.    -Keep n.p.o.     King Brannon MD

## 2020-03-16 NOTE — PROGRESS NOTES
Initial visit by  to convey care and concern and encourage patient that  services are available if desired. No needs were voiced during the visit. Provided 's business card for future reference. Chaplains remain available for support.      Sylvia Brand 66  Board Certified

## 2020-03-16 NOTE — OP NOTES
ORTHOPAEDIC SURGICAL NOTE        Trino Corera male 44 y.o.  [id number]   3/16/2020     PRE-OP DIAGNOSIS: INFECTED LEFT THUMB   POST-OP DIAGNOSIS: INFECTED LEFT THUMB   LATERALITY: Left     PROCEDURES PERFORMED:   Incision and drainage of left thumb abscess secondary to osteomyelitis of the distal phalanx with opening of bone cortex, left thumb nail plate avulsion, nailbed repair, left thumb IP joint arthrotomy and debridement  (17867, [id number], [id number], [id number])       SURGEON:   Asif Dennison MD, PhD     IMPLANTS:   * No implants in log *   Procedure(s):  INCISION AND DRAINAGE LEFT THUMB   Surgeon(s):  Juan Jose Betancourt MD   Procedure(s):  INCISION AND DRAINAGE LEFT THUMB     ANESTHESIA: General     STAFF:    Circ-1: Suleiman Larsen RN  Circ-Relief: Sugar Sousa RN  Scrub Tech-1: Anabella Knox     ESTIMATED BLOOD LOSS: None       TOTAL IV FLUIDS : See anesthesia note    COMPLICATIONS: None     DRAINS:       TOURNIQUET TIME: * No tourniquets in log *     INDICATION FOR PROCEDURE:     Trino Correa developed infection of the left thumb after he sustained an injury where his finger was crushed in a door frame, the patient was admitted for IV antibiotics and an MRI demonstrated osteomyelitis of the distal phalanx. Surgical and non-surgical treatment options were discussed with the patient and their family, as well as the risk and benefits of each option. After thorough discussion, the patient decided to proceed with surgical management. Specific to this treatment plan, we discussed in detail surgical risks including scar, pain, bleeding, infection, anesthetic risks, neurovascular injury, failure to achieve desired results, hardware problems, need for further surgery,  weakness, stiffness, risk of death and potential risk of other unforseen complication. The patient consented to the procedure after discussion of the risks and benefits.          DESCRIPTION OF PROCEDURE:     The patient was identified in the holding room. The Left thumb was marked and confirmed as the correct operative site. They were then brought to the OR and general anesthesia was induced. They were transferred onto the OR table in the supine position. All bony prominences were well padded. SCDs were placed on the bilateral legs throughout the case. A timeout was performed, verifying the correct patient, the correct side being the Left side and the correct procedure. Antibiotics were then administered, and were redosed during the procedure as needed at indicated intervals. A non-sterile tourniquet was placed on the Left arm. The Left upper extremity was pre scrubbed and then prepped and draped in routine sterile fashion. An incisional timeout was performed re-confirming the correct patient, surgical site and procedure, as well as verifying antibiotics. Superficial debridement was initialized with knife, all dead skin and subcutaneous tissue was sharply debrided and excised, the nail plate was removed by sharply dissecting underneath and over it with care to preserve the nailbed, there was significant pockets of pus on the nailbed these were sharply excised with with knife and rongeur. The pulp was incised with a 15 blade on the radial side and this incision was carried through the septae into the ulnar side. A small hemostat was used to break down the septae and avoid re-formation of pus, a culture sample was taken from here. A 0.45 Gerardo wire was used to create perforations in the distal phalanx and allow osteomyelitis to drain.   On the radial side of the thumb, a longitudinal incision along the edge of the nailbed was carried proximally to the level of the IP joint, this fold was elevated to further debride the germinal matrix of purulent material, the IP joint was incised through the same incision, thorough irrigation was done first with bacitracin infused saline, followed by soaking the finger in quarter strength Dakin's solution for 5 minutes. After this, the finger was irrigated again with bacitracin infused saline. And nail fold was created out of aluminum foil and secured to the eponychial him with 5-0 chromic sutures to keep the germinal matrix open. The radial sided wound was also closed with one 5-0 chromic stitch. The pulp wound was packed with iodoform. The tourniquet was deflated and hemostasis was ensured. A sterile dressing was applied followed by a  compressive dressing     The patient was awakened and taken to PACU in stable condition. All sponge and needle counts were correct at the end of the case. I was present and scrubbed for the entire procedure. DISPOSITION:    The patient will return to the hospital bed to resume IV antibiotics, I will take down the dressing on Wednesday and determine if he needs further debridement on Thursday but I do not expect this to be the case. Weightbearing status: As tolerated     CHEMICAL VTE (DVT) PROPHYLAXIS: None warranted for this case     FOLLOW-UP:  10-14 days for wound check and XRs if needed.      Rosalva Lane MD    03/16/20  6:05 PM

## 2020-03-16 NOTE — PROGRESS NOTES
TRANSFER - OUT REPORT:    Verbal report given to [facility] rn(name) on Hina Suarez  being transferred to pre op  (unit) for ordered procedure       Report consisted of patients Situation, Background, Assessment and   Recommendations(SBAR). Information from the following report(s) SBAR, Kardex, Intake/Output, MAR and Recent Results was reviewed with the receiving nurse. Lines:   Peripheral IV 03/15/20 Right; Inner Antecubital (Active)   Site Assessment Clean, dry, & intact 3/16/2020  7:31 AM   Phlebitis Assessment 0 3/16/2020  7:31 AM   Infiltration Assessment 0 3/16/2020  7:31 AM   Dressing Status Clean, dry, & intact 3/16/2020  7:31 AM   Dressing Type Tape;Transparent 3/16/2020  7:31 AM   Hub Color/Line Status Capped 3/16/2020  7:31 AM        Opportunity for questions and clarification was provided.       Patient transported with:   O2 @ 0 liters

## 2020-03-16 NOTE — PROGRESS NOTES
Pharmacokinetic Consult to Pharmacist    Rene Odom is a 44 y.o. male being treated for osteomyelitis and SSTI with vancomycin. Height: 5' 6\" (167.6 cm)  Weight: 74.8 kg (165 lb)  Lab Results   Component Value Date/Time    BUN 11 03/16/2020 06:41 AM    Creatinine 0.68 (L) 03/16/2020 06:41 AM    WBC 8.6 03/16/2020 06:41 AM    Lactic acid 1.5 03/15/2020 01:07 PM      Estimated Creatinine Clearance: 112.7 mL/min (A) (by C-G formula based on SCr of 0.68 mg/dL (L)). CULTURES:  ngtd    Day 2 of vancomycin. MRI came back as osteomyelitis and cellulitis- new goal trough is 15-20. Vancomycin dose initiated at 1250mg Q12H. Predicted trough of 10.9 for current regimen. Recommend to increase dose to 1750mg Q12H for a predicted trough of 15.3, follow Scr (0.55 on 3/15, 0.68 on 3/16). Consider obtaining vancomycin trough level prior to 4th dose of new regimen.      Thank you,  Chava Mcnair, [address], [address] Student

## 2020-03-16 NOTE — ANESTHESIA POSTPROCEDURE EVALUATION
Procedure(s):  INCISION AND DRAINAGE LEFT THUMB.     general    Anesthesia Post Evaluation        Patient location during evaluation: PACU  Patient participation: complete - patient participated  Level of consciousness: awake and alert  Pain management: adequate  Airway patency: patent  Anesthetic complications: no  Cardiovascular status: acceptable  Respiratory status: acceptable  Hydration status: acceptable  Post anesthesia nausea and vomiting:  controlled      Vitals Value Taken Time   /65 3/16/2020  6:45 PM   Temp 36.8 °C (98.2 °F) 3/16/2020  6:45 PM   Pulse 76 3/16/2020  6:45 PM   Resp 16 3/16/2020  6:45 PM   SpO2 100 % 3/16/2020  6:45 PM

## 2020-03-16 NOTE — PROGRESS NOTES
Progress Note    Patient: Martha Gama MRN: [id number]  SSN: [id number]    YOB: 1970  Age: 44 y.o. Sex: male      Admit Date: 3/15/2020    LOS: 1 day     Subjective:     Seen and examined, patient is complaining of his ankle. Has been n.p.o. pending surgery for today. As per MRI, the patient likely also has osteomyelitis. Consult for infectious disease has been placed. Objective:     Vitals:    03/15/20 2308 03/16/20 0515 03/16/20 0744 03/16/20 1154   BP: 118/76 121/75 145/82 131/76   Pulse: 81 84 78 66   Resp: 17 17 18 18   Temp: 98.5 °F (36.9 °C) 97.4 °F (36.3 °C) 98.1 °F (36.7 °C) 97.9 °F (36.6 °C)   SpO2: 95% 94% 98% 97%   Weight:       Height:            Intake and Output:  Current Shift: 03/16 0701 - 03/16 1900  In: -   Out: 850 [Urine:850]  Last three shifts: 03/14 1901 - 03/16 0700  In: -   Out: 400 [Urine:400]    Physical Exam:   GENERAL: alert, cooperative, no distress, appears stated age  LUNG: clear to auscultation bilaterally  HEART: regular rate and rhythm, S1, S2 normal, no murmur, click, rub or gallop  ABDOMEN: soft, non-tender. Bowel sounds normal. No masses,  no organomegaly  EXTREMITIES:  Left thumb covered with dressings, some erythema of the hand noted    Lab/Data Review: All lab results for the last 24 hours reviewed. Assessment:     Principal Problem:    Laceration of left thumb with infection (3/15/2020)    Active Problems:    Diabetes ([facility] [address] (6/4/2014)      HIV (human immunodeficiency virus infection) ([facility] [address] (6/4/2014)      Schizophrenia ([facility] [address] (3/15/2020)      HBV (hepatitis B virus) infection (3/15/2020)      HLD (hyperlipidemia) (3/15/2020)        Plan:     Cellulitis and osteomyelitis of left thumb  Patient on vancomycin, Zosyn, and also consult placed for infectious disease.   She is also pending debridement as per orthopedic surgery today    H/o HIV:   Continue home HAART, HIV viral load, CD4 count    T2DM:   Decrease Lantus from 22 units QPM to 12 units QPM, considering the patient has been slightly hypoglycemic and sliding scale insulin    Chronic Schizophrenia:   Continue home medications    Tobacco abuse  Nicotine patch      Signed By: Nicho Goel MD     March 16, 2020

## 2020-03-16 NOTE — CONSULTS
Infectious Disease Consult    Today's Date: 3/16/2020   Admit Date: 3/15/2020    Impression:   · Left first distal phalanx osteomyelitis: s/p traumatic injury about a week ago  · Cellulitis of subcutaneous soft tissues of the left first digit  · DM2  · HIV- follows at [address] [place] [address]:   · Continue IV Vanc today. · Plan for I&D with ortho surgery later today. Dr. Igor Hays will send cxs for ID. · ID will continue to follow. Anti-infectives:   · Vanc (3/15-    Subjective:   Date of Consultation:  March 16, 2020  Referring Physician: Dr. Nicanor Castanon    Patient is a 44 y.o. male with a history of HIV, DM2, HTN, and bipolar disorder who presented to the ED on 3/15 with pain and skin breakdown to the distal phalanx of his left hand after shutting his hand in door a few days ago. He denies nausea, vomiting, diarrhea, fever, chills, or sweats. Patient reported purulent drainage over the past few days. Hospitalist admitted the patient. MRI (3/15) compatible with osteomyelitis of distal phalanxx of left first digit and cellulitis involving subcutaneous soft tissues of the left first digit. Patient was put on IV Vanc in the ED. Ortho surgery has been consulted as well. ID was asked to see patient for recommendations. Today the patient is doing well. He is sitting up in bed with brother at bedside. He denies any nausea, vomiting, diarrhea, fever, or chills. He reports that Dr. Igor Hays plans to take him to clean up his finger.     Patient Active Problem List   Diagnosis Code    Diabetes ([facility] [address] E11.9    HIV (human immunodeficiency virus infection) ([facility] B20    Laceration of left thumb with infection S61.012A, L08.9    Schizophrenia ([facility] F20.9    HBV (hepatitis B virus) infection B19.10    HLD (hyperlipidemia) E78.5     Past Medical History:   Diagnosis Date    Autoimmune disease ([address]     HIV    Diabetes ([address]     Hypertension     Infectious disease     HIV    Paranoid schizophrenia ([facility] [address] [facility]     Psychiatric disorder bipolar, paranoid,     Seizures ([facility] [address]       History reviewed. No pertinent family history. Social History     Tobacco Use    Smoking status: Current Every Day Smoker     Packs/day: 1.00     Years: 22.00     Pack years: 22.00   Substance Use Topics    Alcohol use: No     Past Surgical History:   Procedure Laterality Date    ABDOMEN SURGERY PROC UNLISTED      hernia repair      Prior to Admission medications    Medication Sig Start Date End Date Taking? Authorizing Provider   OXcarbazepine (TRILEPTAL) 300 mg tablet Take 300 mg by mouth every twelve (12) hours. Yes Provider, Historical   atorvastatin (LIPITOR) 10 mg tablet Take 10 mg by mouth nightly. Yes Provider, Historical   benztropine (COGENTIN) 2 mg tablet Take 2 mg by mouth two (2) times a day. Yes Provider, Historical   OLANZapine (ZyPREXA) 20 mg tablet Take 20 mg by mouth nightly. Yes Provider, Historical   insulin detemir (LEVEMIR) 100 unit/mL injection 30 Units by SubCUTAneous route daily. Yes Other, MD Dixie   emtricitabine-tenofovir (TRUVADA) 200-300 mg per tablet Take 1 Tab by mouth daily. Yes Other, MD Dixie   TRAZODONE HCL (TRAZODONE PO) Take 300 mg by mouth. Unknown dose    Yes Other, MD Dixie   haloperidol (HALDOL) 5 mg tablet Take 5 mg by mouth nightly. 11  Yes Other, MD Dixie   METFORMIN HCL (GLUCOPHAGE PO) Take 1,000 mg by mouth two (2) times a day. Yes Other, MD Dixie   Cetirizine (ZYRTEC) 10 mg cap Take  by mouth daily as needed. Provider, Historical       Allergies   Allergen Reactions    Thorazine [Chlorpromazine] Rash        Review of Systems:  A comprehensive review of systems was negative except for that written in the History of Present Illness.     Objective:     Visit Vitals  /76 (BP 1 Location: Right arm, BP Patient Position: At rest)   Pulse 66   Temp 97.9 °F (36.6 °C)   Resp 18   Ht 5' 6\" (1.676 m)   Wt 74.8 kg (165 lb)   SpO2 97%   BMI 26.63 kg/m²     Temp (24hrs), Av.1 °F (36.7 °C), Min:97.4 °F (36.3 °C), Max:98.5 °F (36.9 °C)       Lines:  Peripheral IV:       Physical Exam:    General:  Alert, cooperative, well noursished, well developed, appears stated age   Eyes:  Sclera anicteric. Pupils equally round and reactive to light. Mouth/Throat: Mucous membranes normal, oral pharynx clear   Neck: Supple   Lungs:   Clear to auscultation bilaterally, good effort   CV:  Regular rate and rhythm,no murmur, click, rub or gallop   Abdomen:   Soft, non-tender. bowel sounds normal. non-distended   Extremities: L first finger- see photo below   Skin: Skin color, texture, turgor normal. no acute rash or lesions; see wound pics below   Lymph nodes: Cervical and supraclavicular normal   Musculoskeletal: No swelling or deformity   Lines/Devices:  Intact, no erythema, drainage or tenderness   Psych: Alert and oriented, normal mood affect given the setting                       Data Review:     CBC:  Recent Labs     03/16/20  0641 03/15/20  1307   WBC 8.6 10.2   GRANS 60 57   MONOS 11 11   EOS 1 1   ANEU 5.2 5.8   ABL 2.3 3.0   HGB 14.6 16.3   HCT 43.6 47.4    236       BMP:  Recent Labs     03/16/20  0641 03/15/20  1420   CREA 0.68* 0.55*   BUN 11 8    134*   K 4.1 3.8    101   CO2 27 28   AGAP 5* 5*   * 133*       LFTS:  No results for input(s): TBILI, ALT, SGOT, AP, TP, ALB in the last 72 hours.     Microbiology:     All Micro Results     Procedure Component Value Units Date/Time    CULTURE, Doyce Splinter STAIN [[id number]]  (Abnormal) Collected:  03/15/20 1145    Order Status:  Completed Specimen:  Wound from Finger Updated:  03/16/20 1254     Special Requests: NO SPECIAL REQUESTS        GRAM STAIN 0 TO 1 WBC'S/OIF      NO EPITHELIAL CELLS SEEN               MODERATE GRAM POS COCCI IN CLUSTERS            FEW GRAM NEGATIVE RODS        Culture result:       HEAVY STAPHYLOCOCCUS AUREUS SUBCULTURE IN PROGRESS            SCANT  NORMAL SKIN MELQUIADES ISOLATED       CULTURE, BLOOD [[id number]] Collected: 03/15/20 1307    Order Status:  Completed Specimen:  Blood Updated:  03/16/20 0632     Special Requests: --        RIGHT  Antecubital       Culture result: NO GROWTH AFTER 16 HOURS       CULTURE, BLOOD [[id number]] Collected:  03/15/20 1420    Order Status:  Completed Specimen:  Blood Updated:  03/16/20 1541     Special Requests: --        LEFT  Antecubital       Culture result: NO GROWTH AFTER 15 HOURS             Imaging:   (3/15) MRI Finger/Hand JT LT w contrast   IMPRESSION:  1. Findings compatible with osteomyelitis of the distal phalanx of the left  first digit. 2. Findings compatible with cellulitis involving the subcutaneous soft tissues  of the left first digit.     Signed By: BC Swenson     March 16, 2020

## 2020-03-16 NOTE — PERIOP NOTES
TRANSFER - IN REPORT:    Verbal report received from LIBBY Mcclure on Humphrey Purpura  being received from [facility] for ordered procedure      Report consisted of patients Situation, Background, Assessment and   Recommendations(SBAR). Information from the following report(s) SBAR and MAR was reviewed with the receiving nurse. Opportunity for questions and clarification was provided. Assessment will be completed upon patients arrival to unit and care assumed.

## 2020-03-16 NOTE — PROGRESS NOTES
TRANSFER - IN REPORT:    Verbal report received from dian rn(name) on Sherhalima Held  being received from pacu(unit) for routine post - op      Report consisted of patients Situation, Background, Assessment and   Recommendations(SBAR). Information from the following report(s) SBAR, Kardex, Procedure Summary, Intake/Output, MAR and Recent Results was reviewed with the receiving nurse. Opportunity for questions and clarification was provided. Assessment completed upon patients arrival to unit and care assumed.

## 2020-03-16 NOTE — PERIOP NOTES
TRANSFER - OUT REPORT:    Verbal report given to Sonu Mares on Jessa Monzon  being transferred to [facility] for routine post - op       Report consisted of patients Situation, Background, Assessment and   Recommendations(SBAR). Information from the following report(s) OR Summary, Procedure Summary, Intake/Output and MAR was reviewed with the receiving nurse. Lines:   Peripheral IV 03/15/20 Right; Inner Antecubital (Active)   Site Assessment Clean, dry, & intact 3/16/2020  6:10 PM   Phlebitis Assessment 0 3/16/2020  6:10 PM   Infiltration Assessment 0 3/16/2020  6:10 PM   Dressing Status Clean, dry, & intact 3/16/2020  6:10 PM   Dressing Type Transparent;Tape 3/16/2020  6:10 PM   Hub Color/Line Status Capped;Flushed 3/16/2020  6:10 PM   Alcohol Cap Used No 3/16/2020  6:10 PM       Peripheral IV 03/16/20 Right Wrist (Active)   Site Assessment Clean, dry, & intact 3/16/2020  6:10 PM   Phlebitis Assessment 0 3/16/2020  6:10 PM   Infiltration Assessment 0 3/16/2020  6:10 PM   Dressing Status Clean, dry, & intact 3/16/2020  6:10 PM   Dressing Type Transparent;Tape 3/16/2020  6:10 PM   Hub Color/Line Status Infusing 3/16/2020  6:10 PM        Opportunity for questions and clarification was provided. Patient transported with:   O2 @ 2 liters    VTE prophylaxis orders have been written for Jessa Monzon. Patient and family given floor number and nurses name. Family updated re: pt status after security code verified.

## 2020-03-17 LAB
ANION GAP SERPL CALC-SCNC: 5 MMOL/L (ref 7–16)
BASOPHILS # BLD AUTO: 0.1 X10E3/UL (ref 0–0.2)
BASOPHILS # BLD: 0 K/UL (ref 0–0.2)
BASOPHILS NFR BLD AUTO: 1 %
BASOPHILS NFR BLD: 0 % (ref 0–2)
BUN SERPL-MCNC: 7 MG/DL (ref 6–23)
CALCIUM SERPL-MCNC: 8.5 MG/DL (ref 8.3–10.4)
CD3+CD4+ CELLS # BLD: 1065 /UL (ref 359–1519)
CD3+CD4+ CELLS NFR BLD: 46.3 % (ref 30.8–58.5)
CHLORIDE SERPL-SCNC: 106 MMOL/L (ref 98–107)
CO2 SERPL-SCNC: 29 MMOL/L (ref 21–32)
CREAT SERPL-MCNC: 0.68 MG/DL (ref 0.8–1.5)
DIFFERENTIAL METHOD BLD: ABNORMAL
EOSINOPHIL # BLD AUTO: 0.1 X10E3/UL (ref 0–0.4)
EOSINOPHIL # BLD: 0 K/UL (ref 0–0.8)
EOSINOPHIL NFR BLD AUTO: 1 %
EOSINOPHIL NFR BLD: 0 % (ref 0.5–7.8)
ERYTHROCYTE [DISTWIDTH] IN BLOOD BY AUTOMATED COUNT: 13.6 % (ref 11.6–15.4)
ERYTHROCYTE [DISTWIDTH] IN BLOOD BY AUTOMATED COUNT: 14 % (ref 11.9–14.6)
GLUCOSE BLD STRIP.AUTO-MCNC: 111 MG/DL (ref 65–100)
GLUCOSE BLD STRIP.AUTO-MCNC: 215 MG/DL (ref 65–100)
GLUCOSE BLD STRIP.AUTO-MCNC: 239 MG/DL (ref 65–100)
GLUCOSE BLD STRIP.AUTO-MCNC: 343 MG/DL (ref 65–100)
GLUCOSE SERPL-MCNC: 148 MG/DL (ref 65–100)
HCT VFR BLD AUTO: 43.6 % (ref 37.5–51)
HCT VFR BLD AUTO: 44.6 % (ref 41.1–50.3)
HGB BLD-MCNC: 14.6 G/DL (ref 13–17.7)
HGB BLD-MCNC: 14.8 G/DL (ref 13.6–17.2)
IMM GRANULOCYTES # BLD AUTO: 0.1 K/UL (ref 0–0.5)
IMM GRANULOCYTES # BLD: 0.1 X10E3/UL (ref 0–0.1)
IMM GRANULOCYTES NFR BLD AUTO: 1 % (ref 0–5)
IMM GRANULOCYTES NFR BLD: 1 %
LYMPHOCYTES # BLD AUTO: 2.3 X10E3/UL (ref 0.7–3.1)
LYMPHOCYTES # BLD: 2.5 K/UL (ref 0.5–4.6)
LYMPHOCYTES NFR BLD AUTO: 27 %
LYMPHOCYTES NFR BLD: 20 % (ref 13–44)
MCH RBC QN AUTO: 27.5 PG (ref 26.1–32.9)
MCH RBC QN AUTO: 27.8 PG (ref 26.6–33)
MCHC RBC AUTO-ENTMCNC: 33.2 G/DL (ref 31.4–35)
MCHC RBC AUTO-ENTMCNC: 33.5 G/DL (ref 31.5–35.7)
MCV RBC AUTO: 82.9 FL (ref 79.6–97.8)
MCV RBC AUTO: 83 FL (ref 79–97)
MONOCYTES # BLD AUTO: 1 X10E3/UL (ref 0.1–0.9)
MONOCYTES # BLD: 1.1 K/UL (ref 0.1–1.3)
MONOCYTES NFR BLD AUTO: 11 %
MONOCYTES NFR BLD: 9 % (ref 4–12)
NEUTROPHILS # BLD AUTO: 5.1 X10E3/UL (ref 1.4–7)
NEUTROPHILS NFR BLD AUTO: 59 %
NEUTS SEG # BLD: 8.6 K/UL (ref 1.7–8.2)
NEUTS SEG NFR BLD: 70 % (ref 43–78)
NRBC # BLD: 0 K/UL (ref 0–0.2)
PLATELET # BLD AUTO: 222 K/UL (ref 150–450)
PLATELET # BLD AUTO: 247 X10E3/UL (ref 150–450)
PMV BLD AUTO: 9.7 FL (ref 9.4–12.3)
POTASSIUM SERPL-SCNC: 3.8 MMOL/L (ref 3.5–5.1)
RBC # BLD AUTO: 5.26 X10E6/UL (ref 4.14–5.8)
RBC # BLD AUTO: 5.38 M/UL (ref 4.23–5.6)
SODIUM SERPL-SCNC: 140 MMOL/L (ref 136–145)
WBC # BLD AUTO: 12.4 K/UL (ref 4.3–11.1)
WBC # BLD AUTO: 8.6 X10E3/UL (ref 3.4–10.8)

## 2020-03-17 PROCEDURE — 65270000029 HC RM PRIVATE

## 2020-03-17 PROCEDURE — 80048 BASIC METABOLIC PNL TOTAL CA: CPT

## 2020-03-17 PROCEDURE — 85025 COMPLETE CBC W/AUTO DIFF WBC: CPT

## 2020-03-17 PROCEDURE — 36415 COLL VENOUS BLD VENIPUNCTURE: CPT

## 2020-03-17 PROCEDURE — 82962 GLUCOSE BLOOD TEST: CPT

## 2020-03-17 PROCEDURE — 74011250636 HC RX REV CODE- 250/636: Performed by: INTERNAL MEDICINE

## 2020-03-17 PROCEDURE — 77010033678 HC OXYGEN DAILY

## 2020-03-17 PROCEDURE — 74011250637 HC RX REV CODE- 250/637: Performed by: FAMILY MEDICINE

## 2020-03-17 PROCEDURE — 74011636637 HC RX REV CODE- 636/637: Performed by: FAMILY MEDICINE

## 2020-03-17 PROCEDURE — 74011000258 HC RX REV CODE- 258: Performed by: INTERNAL MEDICINE

## 2020-03-17 PROCEDURE — 94760 N-INVAS EAR/PLS OXIMETRY 1: CPT

## 2020-03-17 PROCEDURE — 74011250636 HC RX REV CODE- 250/636: Performed by: FAMILY MEDICINE

## 2020-03-17 PROCEDURE — 74011250637 HC RX REV CODE- 250/637: Performed by: INTERNAL MEDICINE

## 2020-03-17 RX ADMIN — Medication 10 ML: at 05:09

## 2020-03-17 RX ADMIN — BENZTROPINE MESYLATE 2 MG: 1 TABLET ORAL at 09:08

## 2020-03-17 RX ADMIN — EMTRICITABINE AND TENOFOVIR DISOPROXIL FUMARATE 1 TABLET: 200; 300 TABLET, FILM COATED ORAL at 09:00

## 2020-03-17 RX ADMIN — TRAZODONE HYDROCHLORIDE 150 MG: 50 TABLET ORAL at 21:25

## 2020-03-17 RX ADMIN — HUMAN INSULIN 4 UNITS: 100 INJECTION, SOLUTION SUBCUTANEOUS at 17:08

## 2020-03-17 RX ADMIN — VANCOMYCIN HYDROCHLORIDE 1750 MG: 10 INJECTION, POWDER, LYOPHILIZED, FOR SOLUTION INTRAVENOUS at 05:08

## 2020-03-17 RX ADMIN — OLANZAPINE 20 MG: 5 TABLET, FILM COATED ORAL at 21:24

## 2020-03-17 RX ADMIN — INSULIN GLARGINE 20 UNITS: 100 INJECTION, SOLUTION SUBCUTANEOUS at 22:12

## 2020-03-17 RX ADMIN — HEPARIN SODIUM 5000 UNITS: 5000 INJECTION INTRAVENOUS; SUBCUTANEOUS at 21:26

## 2020-03-17 RX ADMIN — HUMAN INSULIN 8 UNITS: 100 INJECTION, SOLUTION SUBCUTANEOUS at 22:00

## 2020-03-17 RX ADMIN — BENZTROPINE MESYLATE 2 MG: 1 TABLET ORAL at 17:09

## 2020-03-17 RX ADMIN — PIPERACILLIN AND TAZOBACTAM 3.38 G: 3; .375 INJECTION, POWDER, FOR SOLUTION INTRAVENOUS at 09:08

## 2020-03-17 RX ADMIN — HUMAN INSULIN 4 UNITS: 100 INJECTION, SOLUTION SUBCUTANEOUS at 12:27

## 2020-03-17 RX ADMIN — Medication 5 ML: at 21:28

## 2020-03-17 RX ADMIN — Medication 1 AMPULE: at 09:08

## 2020-03-17 RX ADMIN — OXCARBAZEPINE 300 MG: 300 TABLET, FILM COATED ORAL at 09:08

## 2020-03-17 RX ADMIN — ATORVASTATIN CALCIUM 10 MG: 10 TABLET, FILM COATED ORAL at 21:26

## 2020-03-17 RX ADMIN — Medication 1 AMPULE: at 21:28

## 2020-03-17 RX ADMIN — PIPERACILLIN AND TAZOBACTAM 3.38 G: 3; .375 INJECTION, POWDER, FOR SOLUTION INTRAVENOUS at 00:54

## 2020-03-17 RX ADMIN — PIPERACILLIN AND TAZOBACTAM 3.38 G: 3; .375 INJECTION, POWDER, FOR SOLUTION INTRAVENOUS at 17:06

## 2020-03-17 RX ADMIN — OXCARBAZEPINE 300 MG: 300 TABLET, FILM COATED ORAL at 22:11

## 2020-03-17 RX ADMIN — VANCOMYCIN HYDROCHLORIDE 1750 MG: 10 INJECTION, POWDER, LYOPHILIZED, FOR SOLUTION INTRAVENOUS at 16:57

## 2020-03-17 RX ADMIN — HEPARIN SODIUM 5000 UNITS: 5000 INJECTION INTRAVENOUS; SUBCUTANEOUS at 05:08

## 2020-03-17 RX ADMIN — HEPARIN SODIUM 5000 UNITS: 5000 INJECTION INTRAVENOUS; SUBCUTANEOUS at 14:42

## 2020-03-17 RX ADMIN — Medication 10 ML: at 14:42

## 2020-03-17 NOTE — INTERDISCIPLINARY ROUNDS
Interdisciplinary team rounds were held 3/17/2020 with the following team members:  Care Management, Occupational Therapy and . Plan of care discussed. See clinical pathway and/or care plan for interventions and desired outcomes.

## 2020-03-17 NOTE — PROGRESS NOTES
Infectious Disease Consult    Today's Date: 3/17/2020   Admit Date: 3/15/2020    Impression:   · Left first distal phalanx osteomyelitis: from traumatic injury (closing in door): s/p (3/16) I&D: op cxs (3/16) staph aureus- pending  · Cellulitis of subcutaneous soft tissues of the left first digit  · DM2  · HIV- follows at [address] [place] [address] Street:   · Continue IV Vanc and Zosyn today. · Op cx with staph aureus from 3/16. · ID will continue to follow. Anti-infectives:   · Vanc (3/15-  · Zosyn (3/16-    Subjective: Interval History:  Patient states he is frustrated and voices that he is about to lose his brand new home. He states he wants his bandage off of his finger and is ready to go. He denies nausea, vomiting, diarrhea, fever, chills, or sweats. Patient Active Problem List   Diagnosis Code    Diabetes ([facility] E11.9    HIV (human immunodeficiency virus infection) ([facility] B20    Laceration of left thumb with infection S61.012A, L08.9    Schizophrenia ([facility] F20.9    HBV (hepatitis B virus) infection B19.10    HLD (hyperlipidemia) E78.5     Past Medical History:   Diagnosis Date    Autoimmune disease ([facility]     HIV    Diabetes ([facility] [address] [facility]     Hypertension     Infectious disease     HIV    Paranoid schizophrenia ([facility]     Psychiatric disorder     bipolar, paranoid,     Seizures ([facility] [address]       History reviewed. No pertinent family history. Social History     Tobacco Use    Smoking status: Current Every Day Smoker     Packs/day: 1.00     Years: 22.00     Pack years: 22.00   Substance Use Topics    Alcohol use: No     Past Surgical History:   Procedure Laterality Date    ABDOMEN SURGERY PROC UNLISTED      hernia repair      Prior to Admission medications    Medication Sig Start Date End Date Taking? Authorizing Provider   OXcarbazepine (TRILEPTAL) 300 mg tablet Take 300 mg by mouth every twelve (12) hours. Yes Provider, Historical   atorvastatin (LIPITOR) 10 mg tablet Take 10 mg by mouth nightly.    Yes Provider, Historical   benztropine (COGENTIN) 2 mg tablet Take 2 mg by mouth two (2) times a day. Yes Provider, Historical   OLANZapine (ZyPREXA) 20 mg tablet Take 20 mg by mouth nightly. Yes Provider, Historical   insulin detemir (LEVEMIR) 100 unit/mL injection 30 Units by SubCUTAneous route daily. Yes Other, MD Dixie   emtricitabine-tenofovir (TRUVADA) 200-300 mg per tablet Take 1 Tab by mouth daily. Yes Other, MD Dixie   TRAZODONE HCL (TRAZODONE PO) Take 300 mg by mouth. Unknown dose    Yes Other, MD Dixie   haloperidol (HALDOL) 5 mg tablet Take 5 mg by mouth nightly. 11  Yes Other, MD Dixie   METFORMIN HCL (GLUCOPHAGE PO) Take 1,000 mg by mouth two (2) times a day. Yes Other, MD Dixei   Cetirizine (ZYRTEC) 10 mg cap Take  by mouth daily as needed. Provider, Historical       Allergies   Allergen Reactions    Thorazine [Chlorpromazine] Rash        Review of Systems:  A comprehensive review of systems was negative except for that written in the History of Present Illness. Objective:     Visit Vitals  /64 (BP 1 Location: Right arm, BP Patient Position: At rest)   Pulse 81   Temp 98.6 °F (37 °C)   Resp 16   Ht 5' 6\" (1.676 m)   Wt 74.8 kg (165 lb)   SpO2 94%   BMI 26.63 kg/m²     Temp (24hrs), Av.1 °F (36.7 °C), Min:97.7 °F (36.5 °C), Max:98.6 °F (37 °C)     Patient was seen and examined on 3/17/20 and physical exam remains unchanged since yesterday, unless noted below:    Lines:  Peripheral IV:       Physical Exam:    General:  Alert, cooperative, well noursished, well developed, appears stated age   Eyes:  Sclera anicteric. Pupils equally round and reactive to light. Mouth/Throat: Mucous membranes normal, oral pharynx clear   Neck: Supple   Lungs:   Clear to auscultation bilaterally, good effort   CV:  Regular rate and rhythm,no murmur, click, rub or gallop   Abdomen:   Soft, non-tender.  bowel sounds normal. non-distended   Extremities: L first finger- see photo below   Skin: Skin color, texture, turgor normal. no acute rash or lesions; see wound pics below from 3/16- post op dressing in place today   Lymph nodes: Cervical and supraclavicular normal   Musculoskeletal: No swelling or deformity   Lines/Devices:  Intact, no erythema, drainage or tenderness   Psych: Alert and oriented, normal mood affect given the setting                       Data Review:     CBC:  Recent Labs     03/17/20  0412 03/16/20  0641 03/15/20  1307   WBC 12.4* 8.6  8.6 10.2   GRANS 70 59  60 57   MONOS 9 11  11 11   EOS 0* 1  1 1   ANEU 8.6* 5.1  5.2 5.8   ABL 2.5 2.3 3.0   HGB 14.8 14.6  14.6 16.3   HCT 44.6 43.6  43.6 47.4    247  215 236       BMP:  Recent Labs     03/17/20  0412 03/16/20  0641 03/15/20  1420   CREA 0.68* 0.68* 0.55*   BUN 7 11 8    138 134*   K 3.8 4.1 3.8    106 101   CO2 29 27 28   AGAP 5* 5* 5*   * 275* 133*       LFTS:  No results for input(s): TBILI, ALT, SGOT, AP, TP, ALB in the last 72 hours.     Microbiology:     All Micro Results     Procedure Component Value Units Date/Time    CULTURE, Jhonathan Leach STAIN [[id number]]  (Abnormal) Collected:  03/16/20 1726    Order Status:  Completed Specimen:  Thumb Updated:  03/17/20 0912     Special Requests: NO SPECIAL REQUESTS        GRAM STAIN PENDING     Culture result:       LIGHT STAPHYLOCOCCUS AUREUS                  For Susceptibility Refer to Culture  Accession [id number]      CULTURE, Jhonathan Leach STAIN [[id number]]  (Abnormal)  (Susceptibility) Collected:  03/15/20 1145    Order Status:  Completed Specimen:  Wound from Finger Updated:  03/17/20 0846     Special Requests: NO SPECIAL REQUESTS        GRAM STAIN 0 TO 1 WBC'S/OIF      NO EPITHELIAL CELLS SEEN               MODERATE GRAM POS COCCI IN CLUSTERS            FEW GRAM NEGATIVE RODS        Culture result:       HEAVY * METHICILLIN RESISTANT STAPHYLOCOCCUS AUREUS *            SCANT  NORMAL SKIN MELQUIADES ISOLATED               CULTURE IN PROGRESS,FURTHER UPDATES TO FOLLOW                  RESULTS VERIFIED, PHONED TO AND READ BACK BY  Cherelle Gifford RN ON 3/17/20 @0850, TA      CULTURE, BLOOD [[id number]] Collected:  03/15/20 1307    Order Status:  Completed Specimen:  Blood Updated:  03/17/20 0650     Special Requests: --        RIGHT  Antecubital       Culture result: NO GROWTH 2 DAYS       CULTURE, BLOOD [[id number]] Collected:  03/15/20 1420    Order Status:  Completed Specimen:  Blood Updated:  03/17/20 0650     Special Requests: --        LEFT  Antecubital       Culture result: NO GROWTH 2 DAYS       CULTURE, ANAEROBIC [[id number]] Collected:  03/16/20 1726    Order Status:  Completed Updated:  03/16/20 2011          Imaging:   (3/15) MRI Finger/Hand JT LT w contrast   IMPRESSION:  1. Findings compatible with osteomyelitis of the distal phalanx of the left  first digit. 2. Findings compatible with cellulitis involving the subcutaneous soft tissues  of the left first digit.     Signed By: BC Villegas     March 17, 2020

## 2020-03-17 NOTE — PROGRESS NOTES
Progress Note    Patient: Humphrey Solis MRN: [id number]  SSN: [id number]    YOB: 1970  Age: 44 y.o. Sex: male      Admit Date: 3/15/2020    LOS: 2 days     Subjective:     Patient seen and examined, status post debridement, he is growing MRSA from the wound cultures, final blood culture still pending. Infectious disease following. Patient had trauma to his left stump, this was followed by infection, cellulitis, and also MRI showed osteomyelitis he will most likely need an extended course of IV antibiotics. Patient also has a history of HIV. Infectious disease is the patient, patient is status post overt debridement today. Objective:     Vitals:    03/17/20 0431 03/17/20 0836 03/17/20 1157 03/17/20 1536   BP: 113/73 123/64 128/75 126/81   Pulse: 64 81 61 (!) 59   Resp: 18 16 16 16   Temp: 98.1 °F (36.7 °C) 98.6 °F (37 °C) 98.3 °F (36.8 °C) 98.2 °F (36.8 °C)   SpO2: 95% 94% 96% 97%   Weight:       Height:            Intake and Output:  Current Shift: 03/17 0701 - 03/17 1900  In: -   Out: 700 [Urine:700]  Last three shifts: 03/15 1901 - 03/17 0700  In: 650 [P.O.:200; I.V.:450]  Out: 1960 [Urine:1950]    Physical Exam:   GENERAL: alert, cooperative, no distress, appears stated age  LUNG: clear to auscultation bilaterally  HEART: regular rate and rhythm, S1, S2 normal, no murmur, click, rub or gallop  ABDOMEN: soft, non-tender. Bowel sounds normal. No masses,  no organomegaly  EXTREMITIES:  S/p left thumb debridement    Lab/Data Review: All lab results for the last 24 hours reviewed.          Assessment:     Principal Problem:    Laceration of left thumb with infection (3/15/2020)    Active Problems:    Diabetes ([address] (6/4/2014)      HIV (human immunodeficiency virus infection) ([facility] (6/4/2014)      Schizophrenia ([facility] (3/15/2020)      HBV (hepatitis B virus) infection (3/15/2020)      HLD (hyperlipidemia) (3/15/2020)        Plan:     Cellulitis and osteomyelitis of left thumb  Growing Haseeb Alaniz from wound Cx  Pending final isolation, and sensitivity  Continue Patient on vancomycin, Zosyn, and also consult placed for infectious disease.   Antibiotics likely to be changed tomorrow, depending on final isolates  She is also pending debridement as per orthopedic surgery today     H/o HIV:   Continue home HAART, HIV viral load, CD4 count     T2DM:   Decrease Lantus from 22 units QPM to 12 units QPM, considering the patient has been slightly hypoglycemic and sliding scale insulin     Chronic Schizophrenia:   Continue home medications     Tobacco abuse  Nicotine patch    Signed By: Brendon Whipple MD     March 17, 2020

## 2020-03-18 VITALS
RESPIRATION RATE: 16 BRPM | DIASTOLIC BLOOD PRESSURE: 81 MMHG | BODY MASS INDEX: 26.52 KG/M2 | WEIGHT: 165 LBS | HEIGHT: 66 IN | HEART RATE: 63 BPM | TEMPERATURE: 98.3 F | SYSTOLIC BLOOD PRESSURE: 124 MMHG | OXYGEN SATURATION: 98 %

## 2020-03-18 LAB
ANION GAP SERPL CALC-SCNC: 5 MMOL/L (ref 7–16)
BACTERIA SPEC CULT: ABNORMAL
BASOPHILS # BLD: 0 K/UL (ref 0–0.2)
BASOPHILS NFR BLD: 1 % (ref 0–2)
BUN SERPL-MCNC: 10 MG/DL (ref 6–23)
CALCIUM SERPL-MCNC: 8.2 MG/DL (ref 8.3–10.4)
CHLORIDE SERPL-SCNC: 107 MMOL/L (ref 98–107)
CO2 SERPL-SCNC: 29 MMOL/L (ref 21–32)
CREAT SERPL-MCNC: 0.67 MG/DL (ref 0.8–1.5)
DIFFERENTIAL METHOD BLD: NORMAL
EOSINOPHIL # BLD: 0.1 K/UL (ref 0–0.8)
EOSINOPHIL NFR BLD: 1 % (ref 0.5–7.8)
ERYTHROCYTE [DISTWIDTH] IN BLOOD BY AUTOMATED COUNT: 13.8 % (ref 11.9–14.6)
EST. AVERAGE GLUCOSE BLD GHB EST-MCNC: 203 MG/DL
GLUCOSE BLD STRIP.AUTO-MCNC: 161 MG/DL (ref 65–100)
GLUCOSE BLD STRIP.AUTO-MCNC: 295 MG/DL (ref 65–100)
GLUCOSE SERPL-MCNC: 205 MG/DL (ref 65–100)
GRAM STN SPEC: ABNORMAL
HBA1C MFR BLD: 8.7 % (ref 4.8–6)
HCT VFR BLD AUTO: 44.3 % (ref 41.1–50.3)
HGB BLD-MCNC: 14.7 G/DL (ref 13.6–17.2)
IMM GRANULOCYTES # BLD AUTO: 0.1 K/UL (ref 0–0.5)
IMM GRANULOCYTES NFR BLD AUTO: 1 % (ref 0–5)
LYMPHOCYTES # BLD: 2.5 K/UL (ref 0.5–4.6)
LYMPHOCYTES NFR BLD: 33 % (ref 13–44)
MCH RBC QN AUTO: 27.6 PG (ref 26.1–32.9)
MCHC RBC AUTO-ENTMCNC: 33.2 G/DL (ref 31.4–35)
MCV RBC AUTO: 83.3 FL (ref 79.6–97.8)
MONOCYTES # BLD: 1 K/UL (ref 0.1–1.3)
MONOCYTES NFR BLD: 12 % (ref 4–12)
NEUTS SEG # BLD: 4 K/UL (ref 1.7–8.2)
NEUTS SEG NFR BLD: 52 % (ref 43–78)
NRBC # BLD: 0 K/UL (ref 0–0.2)
PLATELET # BLD AUTO: 239 K/UL (ref 150–450)
PMV BLD AUTO: 9.6 FL (ref 9.4–12.3)
POTASSIUM SERPL-SCNC: 3.8 MMOL/L (ref 3.5–5.1)
RBC # BLD AUTO: 5.32 M/UL (ref 4.23–5.6)
SERVICE CMNT-IMP: ABNORMAL
SERVICE CMNT-IMP: ABNORMAL
SODIUM SERPL-SCNC: 141 MMOL/L (ref 136–145)
VANCOMYCIN TROUGH SERPL-MCNC: 13.8 UG/ML (ref 5–20)
WBC # BLD AUTO: 7.7 K/UL (ref 4.3–11.1)

## 2020-03-18 PROCEDURE — 74011250636 HC RX REV CODE- 250/636: Performed by: INTERNAL MEDICINE

## 2020-03-18 PROCEDURE — 74011250637 HC RX REV CODE- 250/637: Performed by: NURSE PRACTITIONER

## 2020-03-18 PROCEDURE — 82962 GLUCOSE BLOOD TEST: CPT

## 2020-03-18 PROCEDURE — 74011250637 HC RX REV CODE- 250/637: Performed by: INTERNAL MEDICINE

## 2020-03-18 PROCEDURE — 80202 ASSAY OF VANCOMYCIN: CPT

## 2020-03-18 PROCEDURE — 85025 COMPLETE CBC W/AUTO DIFF WBC: CPT

## 2020-03-18 PROCEDURE — 36415 COLL VENOUS BLD VENIPUNCTURE: CPT

## 2020-03-18 PROCEDURE — 74011636637 HC RX REV CODE- 636/637: Performed by: FAMILY MEDICINE

## 2020-03-18 PROCEDURE — 74011000258 HC RX REV CODE- 258: Performed by: INTERNAL MEDICINE

## 2020-03-18 PROCEDURE — 80048 BASIC METABOLIC PNL TOTAL CA: CPT

## 2020-03-18 PROCEDURE — 74011250637 HC RX REV CODE- 250/637: Performed by: FAMILY MEDICINE

## 2020-03-18 PROCEDURE — 83036 HEMOGLOBIN GLYCOSYLATED A1C: CPT

## 2020-03-18 PROCEDURE — 74011250636 HC RX REV CODE- 250/636: Performed by: FAMILY MEDICINE

## 2020-03-18 RX ORDER — DOXYCYCLINE 100 MG/1
100 CAPSULE ORAL EVERY 12 HOURS
Status: DISCONTINUED | OUTPATIENT
Start: 2020-03-18 | End: 2020-03-18 | Stop reason: HOSPADM

## 2020-03-18 RX ORDER — HYDROCODONE BITARTRATE AND ACETAMINOPHEN 5; 325 MG/1; MG/1
1 TABLET ORAL
Qty: 12 TAB | Refills: 0 | Status: SHIPPED | OUTPATIENT
Start: 2020-03-18 | End: 2020-03-21

## 2020-03-18 RX ORDER — DOXYCYCLINE 100 MG/1
100 CAPSULE ORAL EVERY 12 HOURS
Qty: 90 CAP | Refills: 0 | Status: SHIPPED | OUTPATIENT
Start: 2020-03-18 | End: 2020-05-02

## 2020-03-18 RX ORDER — DOXYCYCLINE 100 MG/1
100 CAPSULE ORAL EVERY 12 HOURS
Qty: 90 CAP | Refills: 0 | Status: SHIPPED | OUTPATIENT
Start: 2020-03-18 | End: 2020-03-18

## 2020-03-18 RX ADMIN — Medication 1 AMPULE: at 09:47

## 2020-03-18 RX ADMIN — HEPARIN SODIUM 5000 UNITS: 5000 INJECTION INTRAVENOUS; SUBCUTANEOUS at 05:53

## 2020-03-18 RX ADMIN — DOXYCYCLINE HYCLATE 100 MG: 100 CAPSULE ORAL at 11:47

## 2020-03-18 RX ADMIN — HUMAN INSULIN 6 UNITS: 100 INJECTION, SOLUTION SUBCUTANEOUS at 11:47

## 2020-03-18 RX ADMIN — PIPERACILLIN AND TAZOBACTAM 3.38 G: 3; .375 INJECTION, POWDER, FOR SOLUTION INTRAVENOUS at 09:47

## 2020-03-18 RX ADMIN — HUMAN INSULIN 2 UNITS: 100 INJECTION, SOLUTION SUBCUTANEOUS at 07:56

## 2020-03-18 RX ADMIN — OXCARBAZEPINE 300 MG: 300 TABLET, FILM COATED ORAL at 09:47

## 2020-03-18 RX ADMIN — EMTRICITABINE AND TENOFOVIR DISOPROXIL FUMARATE 1 TABLET: 200; 300 TABLET, FILM COATED ORAL at 09:54

## 2020-03-18 RX ADMIN — BENZTROPINE MESYLATE 2 MG: 1 TABLET ORAL at 09:47

## 2020-03-18 RX ADMIN — PIPERACILLIN AND TAZOBACTAM 3.38 G: 3; .375 INJECTION, POWDER, FOR SOLUTION INTRAVENOUS at 01:50

## 2020-03-18 RX ADMIN — VANCOMYCIN HYDROCHLORIDE 1750 MG: 10 INJECTION, POWDER, LYOPHILIZED, FOR SOLUTION INTRAVENOUS at 05:56

## 2020-03-18 RX ADMIN — Medication 5 ML: at 05:52

## 2020-03-18 NOTE — DISCHARGE INSTRUCTIONS
Patient Education   Doxycycline (By mouth)   Doxycycline ([id number]-[id number])  Treats and prevents infections. Also used to prevent malaria and treat rosacea or severe acne. This medicine is a tetracycline antibiotic. Brand Name(s): Acticlate, Adoxa, Adoxa Almas 1/150, Avidoxy, Avidoxy DK, BenzoDox 30 Kit, BenzoDox 60 Kit, Doryx, Doryx MPC, Monodox, Morgidox [id number], Morgidox [id number] Kit, Morgidox 1x50MG, Morgidox 1x50MG Kit, Morgidox [id number]   There may be other brand names for this medicine. When This Medicine Should Not Be Used: This medicine is not right for everyone. Do not use it if you had an allergic reaction to doxycycline or another tetracycline antibiotic, or if you are pregnant or breastfeeding. How to Use This Medicine:   Capsule, Delayed Release Capsule, Long Acting Capsule, Liquid, Tablet, Delayed Release Tablet  · Your doctor will tell you how much medicine to use. Do not use more than directed. · Ask your pharmacist or doctor if you need to take this medicine with or without food. Some forms can be taken with food or milk, but others must be taken on an empty stomach. · Oracea® capsules: This medicine must be taken on an empty stomach, at least 1 hour before or 2 hours after a meal.  · Capsule: Swallow whole. Do not break, crush, chew, or open it. · Delayed-release tablets: You may also take this medicine by sprinkling the broken tablets onto room-temperature applesauce. Swallow this mixture right away. Do not chew it. Do not store the mixture for later use. You may take this medicine with food or milk to avoid stomach upset. · Oral liquid: Shake the bottle well just before each use. Measure the oral liquid medicine with a marked measuring spoon, oral syringe, or medicine cup. · Tablets: You may take this medicine with food or milk to avoid stomach irritation. To break a tablet, hold the tablet between your thumb and index fingers close to the appropriate scored line.  Then, apply enough pressure to snap the tablet segments apart. Do not use the tablet if it does not break on the scored lines. · Take all of the medicine in your prescription to clear up your infection, even if you feel better after the first few doses. · Drink plenty of fluids to avoid throat problems, if you take the capsule or tablet form. · Malaria prevention: Start taking the medicine 1 or 2 days before you travel. Take the medicine every day during your trip. Keep taking it for 4 weeks after you return. However, do not use the medicine for longer than 4 months. · Do not use this medicine for more than 9 months if you are using it for rosacea. · Use only the brand of medicine your doctor prescribed. Other brands may not work the same way. · Read and follow the patient instructions that come with this medicine. Talk to your doctor or pharmacist if you have any questions. · Missed dose: Take a dose as soon as you remember. If it is almost time for your next dose, wait until then and take a regular dose. Do not take extra medicine to make up for a missed dose. · Store the medicine in a closed container at room temperature, away from heat, moisture, and direct light. Do not freeze the oral liquid. Drugs and Foods to Avoid:   Ask your doctor or pharmacist before using any other medicine, including over-the-counter medicines, vitamins, and herbal products. · Some medicines can affect how doxycycline works.  Tell your doctor if you are using any of the following:  ¨ Bismuth subsalicylate  ¨ Acne medicines (including isotretinoin)  ¨ Birth control pills  ¨ Blood thinner (including warfarin)  ¨ Medicine for seizures (including carbamazepine, phenobarbital, phenytoin)  ¨ Medicine that contains aluminum, calcium, or iron (including an antacid or vitamin supplement)  ¨ Medicine to treat psoriasis (including acitretin)  ¨ Penicillin antibiotic  ¨ Stomach medicine  Warnings While Using This Medicine:   · This medicine may cause birth defects if either partner is using it during conception or pregnancy. Tell your doctor right away if you or your partner becomes pregnant. Birth control pills may not work as well when used with this medicine. Use a second form of birth control to keep from getting pregnant. · Tell your doctor if you have kidney disease, liver disease, asthma, or an allergy to sulfites. Tell your doctor if you had surgery on your stomach, or if you have a history of yeast infections. · This medicine may cause the following problems:  ¨ Permanent change in tooth color (in children younger than 6years old)  ¨ Increased pressure inside the head  ¨ Yeast infection  ¨ Immune system problems  · This medicine can cause diarrhea. Call your doctor if the diarrhea becomes severe, does not stop, or is bloody. Do not take any medicine to stop diarrhea until you have talked to your doctor. Diarrhea can occur 2 months or more after you stop taking this medicine. · This medicine may make your skin more sensitive to sunlight. Wear sunscreen. Do not use sunlamps or tanning beds. · Tell any doctor or dentist who treats you that you are using this medicine. This medicine may affect certain medical test results. · Call your doctor if your symptoms do not improve or if they get worse. · Your doctor will do lab tests at regular visits to check on the effects of this medicine. Keep all appointments. · Keep all medicine out of the reach of children. Never share your medicine with anyone.   Possible Side Effects While Using This Medicine:   Call your doctor right away if you notice any of these side effects:  · Allergic reaction: Itching or hives, swelling in your face or hands, swelling or tingling in your mouth or throat, chest tightness, trouble breathing  · Blistering, peeling, red skin rash  · Burning, pain, or irritation in your upper stomach or throat  · Diarrhea that may contain blood  · Fever, chills, cough, runny or stuffy nose, sore throat, body aches  · Joint pain, fever, rash, and unusual tiredness or weakness  · Severe headache, dizziness, vision changes  · Sudden and severe stomach pain, nausea, vomiting, lightheadedness  If you notice these less serious side effects, talk with your doctor:   · Darkening of your skin, scars, teeth, or gums  · Sores or white patches on your lips, mouth, or throat  If you notice other side effects that you think are caused by this medicine, tell your doctor. Call your doctor for medical advice about side effects. You may report side effects to FDA at 6-204-LRU-6793  © 2017 [facility] Information is for End User's use only and may not be sold, redistributed or otherwise used for commercial purposes. The above information is an  only. It is not intended as medical advice for individual conditions or treatments. Talk to your doctor, nurse or pharmacist before following any medical regimen to see if it is safe and effective for you. Patient Education        Cellulitis: Care Instructions  Your Care Instructions    Cellulitis is a skin infection caused by bacteria, most often strep or staph. It often occurs after a break in the skin from a scrape, cut, bite, or puncture, or after a rash. Cellulitis may be treated without doing tests to find out what caused it. But your doctor may do tests, if needed, to look for a specific bacteria, like methicillin-resistant Staphylococcus aureus (MRSA). The doctor has checked you carefully, but problems can develop later. If you notice any problems or new symptoms, get medical treatment right away. Follow-up care is a key part of your treatment and safety. Be sure to make and go to all appointments, and call your doctor if you are having problems. It's also a good idea to know your test results and keep a list of the medicines you take. How can you care for yourself at home? · Take your antibiotics as directed.  Do not stop taking them just because you feel better. You need to take the full course of antibiotics. · Prop up the infected area on pillows to reduce pain and swelling. Try to keep the area above the level of your heart as often as you can. · If your doctor told you how to care for your wound, follow your doctor's instructions. If you did not get instructions, follow this general advice:  ? Wash the wound with clean water 2 times a day. Don't use hydrogen peroxide or alcohol, which can slow healing. ? You may cover the wound with a thin layer of petroleum jelly, such as Vaseline, and a nonstick bandage. ? Apply more petroleum jelly and replace the bandage as needed. · Be safe with medicines. Take pain medicines exactly as directed. ? If the doctor gave you a prescription medicine for pain, take it as prescribed. ? If you are not taking a prescription pain medicine, ask your doctor if you can take an over-the-counter medicine. To prevent cellulitis in the future  · Try to prevent cuts, scrapes, or other injuries to your skin. Cellulitis most often occurs where there is a break in the skin. · If you get a scrape, cut, mild burn, or bite, wash the wound with clean water as soon as you can to help avoid infection. Don't use hydrogen peroxide or alcohol, which can slow healing. · If you have swelling in your legs (edema), support stockings and good skin care may help prevent leg sores and cellulitis. · Take care of your feet, especially if you have diabetes or other conditions that increase the risk of infection. Wear shoes and socks. Do not go barefoot. If you have athlete's foot or other skin problems on your feet, talk to your doctor about how to treat them. When should you call for help? Call your doctor now or seek immediate medical care if:    · You have signs that your infection is getting worse, such as:  ? Increased pain, swelling, warmth, or redness. ? Red streaks leading from the area.   ? Pus draining from the area.  ? A fever.     · You get a rash.    Watch closely for changes in your health, and be sure to contact your doctor if:    · You do not get better as expected. Where can you learn more? Go to http://cierra-derrell.info/  Enter X309 in the search box to learn more about \"Cellulitis: Care Instructions. \"  Current as of: October 30, 2019Content Version: 12.4  © 2641-6720 [employer]. Care instructions adapted under license by [employer] (which disclaims liability or warranty for this information). If you have questions about a medical condition or this instruction, always ask your healthcare professional. [address] any warranty or liability for your use of this information.

## 2020-03-18 NOTE — PROGRESS NOTES
Pharmacokinetic Consult to Pharmacist    Mayo Andre is a 44 y.o. male being treated for osteomyelitis and SSTI with vancomycin. Height: 5' 6\" (167.6 cm)  Weight: 74.8 kg (165 lb)  Lab Results   Component Value Date/Time    BUN 10 03/18/2020 03:59 AM    Creatinine 0.67 (L) 03/18/2020 03:59 AM    WBC 7.7 03/18/2020 03:59 AM    Lactic acid 1.5 03/15/2020 01:07 PM      Estimated Creatinine Clearance: 112.7 mL/min (A) (by C-G formula based on SCr of 0.67 mg/dL (L)). Day 4 of vancomycin. MRI came back as osteomyelitis and cellulitis- new goal trough is 15-20. Vancomycin trough of 13.8 on 1750 mg every 12 hours. Will adjust the dose to 1000 mg every 8 hours.     Thank you,  Jean Lucio, PharmD, [address] [facility]  Clinical Pharmacy Specialist  [phone]

## 2020-03-18 NOTE — DIABETES MGMT
Patient admitted with laceration of left thumb[facility] Patient has a past history of DM, HIV, Hep B, HLD, smoking, and schizophrenia. Patient voices no known family history of DM, but estimates that he was diagnosed with DM around  or . Blood glucose 133 on admission. Blood glucose ranged 111-343 yesterday with patient receiving Lantus 20 units and regular insulin 16 units. Blood glucose 161 this morning. Patient reports that he takes glipizide, glucophage, and basaglar 50 units daily at home and checks his blood glucose every morning. A1c 8.7 (eA). Discussed the significance of this number with patient and reviewed goals for A1c and blood glucose. Patient states that sometimes he \"gets into trouble like when he got on to that marijuana with the crack in it. \" No drug screen noted in chart. Educated patient that it currently looks like he needs a change in his regimen with him likely benefiting from bolus insulins to improve his control. Patient is resistant to this idea. \" I want to keep things the same, that would be easier for me. I don't want to do no changes right now. \" Will notify provider. Encouraged patient to follow up with his PCP and get his regimen titrated to improve his control to reduce his risk of complications. Patient would benefit from the initiation of prandial insulin while inpatient to improve glycemic control. Unsure if patient would be able to handle basal/bolus regimen by himself moving forward.

## 2020-03-18 NOTE — PROGRESS NOTES
Problem: Falls - Risk of  Goal: *Absence of Falls  Description: Document Ramone Newell Fall Risk and appropriate interventions in the flowsheet. Outcome: Progressing Towards Goal  Note: Fall Risk Interventions:            Medication Interventions: Teach patient to arise slowly                   Problem: Patient Education: Go to Patient Education Activity  Goal: Patient/Family Education  Outcome: Progressing Towards Goal     Problem: Pain  Goal: *Control of Pain  Outcome: Progressing Towards Goal     Problem: Patient Education: Go to Patient Education Activity  Goal: Patient/Family Education  Outcome: Progressing Towards Goal     Problem: General Wound Care  Goal: *Non-infected wound: Improvement of existing wound, absence of infection, and maintenance of skin integrity  Outcome: Progressing Towards Goal  Goal: *Infected Wound: Prevention of further infection and promotion of healing  Description: Infection control procedures (eg: clean dressings, clean gloves, hand washing, precautions to isolate wound from contamination, sterile instruments used for wound debridement) should be implemented.   Outcome: Progressing Towards Goal  Goal: Interventions  Outcome: Progressing Towards Goal     Problem: Patient Education: Go to Patient Education Activity  Goal: Patient/Family Education  Outcome: Progressing Towards Goal     Problem: Diabetes Maintenance:Admission  Goal: Activity/Safety  Outcome: Progressing Towards Goal  Goal: Diagnostic Tests/Procedures  Outcome: Progressing Towards Goal  Goal: Nutrition  Outcome: Progressing Towards Goal  Goal: Medications  Outcome: Progressing Towards Goal  Goal: Treatments/Interventions/Procedures  Outcome: Progressing Towards Goal     Problem: Diabetes Maintenance:Ongoing  Goal: Activity/Safety  Outcome: Progressing Towards Goal  Goal: Nutrition  Outcome: Progressing Towards Goal  Goal: Medications  Outcome: Progressing Towards Goal  Goal: Treatments/Interventsions/Procedures  Outcome: Progressing Towards Goal  Goal: *Blood Glucose 80 to 180 md/dl  Outcome: Progressing Towards Goal     Problem: Diabetes Maintenance:Discharge Outcomes  Goal: *Describes follow-up/return visits to physicians  Outcome: Progressing Towards Goal  Goal: *Blood glucose at patient's target range or approaching  Outcome: Progressing Towards Goal  Goal: *Aware of nutrition guidelines  Outcome: Progressing Towards Goal  Goal: *Verbalizes information about medication  Description: Verbalizes name, dosage, time, side effects, and number of days to  continue medications.   Outcome: Progressing Towards Goal  Goal: *Describes goals, rules, symptoms, and treatments  Description: Describes blood glucose goals, monitoring, sick day rules,  hypo/hyperglycemia prevention, symptoms, and treatment  Outcome: Progressing Towards Goal  Goal: *Describes available outpatient diabetes resources and support systems  Outcome: Progressing Towards Goal

## 2020-03-18 NOTE — PROGRESS NOTES
Infectious Disease Consult    Today's Date: 3/18/2020   Admit Date: 3/15/2020    Impression:   · Left first distal phalanx osteomyelitis: from traumatic injury (closing in door): s/p (3/16) I&D: op cxs (3/16) staph aureus- pending  · Cellulitis of subcutaneous soft tissues of the left first digit  · DM2  · HIV- follows at [address] [place] [address] Street:   · Stop IV Vanc and Zosyn today. Op cx with staph aureus from 3/16. Wound cx 3/15 with MRSA. Will start Doxycycline 100 mg PO BID today for 4-6 week duration pending clinical progression. · ID follow up appt on 4/29 at 11:00 am.    Anti-infectives:   · Vanc (3/15-  · Zosyn (3/16-    Subjective: Interval History:  Patient doing well today with no complaints. He states he is anxious to get home. He denies any nausea, vomiting, diarrhea, fever, chills, or sweats. Patient Active Problem List   Diagnosis Code    Diabetes ([facility] [address] E11.9    HIV (human immunodeficiency virus infection) ([facility] [address] [facility] B20    Laceration of left thumb with infection S61.012A, L08.9    Schizophrenia ([facility] [address] F20.9    HBV (hepatitis B virus) infection B19.10    HLD (hyperlipidemia) E78.5     Past Medical History:   Diagnosis Date    Autoimmune disease ([facility] [address] [facility]     HIV    Diabetes ([facility] [address] [facility]     Hypertension     Infectious disease     HIV    Paranoid schizophrenia ([facility] [address] [facility]     Psychiatric disorder     bipolar, paranoid,     Seizures ([facility] [address]       History reviewed. No pertinent family history. Social History     Tobacco Use    Smoking status: Current Every Day Smoker     Packs/day: 1.00     Years: 22.00     Pack years: 22.00   Substance Use Topics    Alcohol use: No     Past Surgical History:   Procedure Laterality Date    ABDOMEN SURGERY PROC UNLISTED      hernia repair      Prior to Admission medications    Medication Sig Start Date End Date Taking? Authorizing Provider   OXcarbazepine (TRILEPTAL) 300 mg tablet Take 300 mg by mouth every twelve (12) hours.    Yes Provider, Historical   atorvastatin (LIPITOR) 10 mg tablet Take 10 mg by mouth nightly. Yes Provider, Historical   benztropine (COGENTIN) 2 mg tablet Take 2 mg by mouth two (2) times a day. Yes Provider, Historical   OLANZapine (ZyPREXA) 20 mg tablet Take 20 mg by mouth nightly. Yes Provider, Historical   insulin detemir (LEVEMIR) 100 unit/mL injection 30 Units by SubCUTAneous route daily. Yes Other, MD Dixie   emtricitabine-tenofovir (TRUVADA) 200-300 mg per tablet Take 1 Tab by mouth daily. Yes Other, MD Dxiie   TRAZODONE HCL (TRAZODONE PO) Take 300 mg by mouth. Unknown dose    Yes Other, MD Dixie   haloperidol (HALDOL) 5 mg tablet Take 5 mg by mouth nightly. 11  Yes Other, MD Dixie   METFORMIN HCL (GLUCOPHAGE PO) Take 1,000 mg by mouth two (2) times a day. Yes Other, MD Dixie   Cetirizine (ZYRTEC) 10 mg cap Take  by mouth daily as needed. Provider, Historical       Allergies   Allergen Reactions    Thorazine [Chlorpromazine] Rash        Review of Systems:  A comprehensive review of systems was negative except for that written in the History of Present Illness. Objective:     Visit Vitals  /77 (BP 1 Location: Right arm, BP Patient Position: Sitting)   Pulse (!) 56   Temp 97.6 °F (36.4 °C)   Resp 16   Ht 5' 6\" (1.676 m)   Wt 74.8 kg (165 lb)   SpO2 99%   BMI 26.63 kg/m²     Temp (24hrs), Av °F (36.7 °C), Min:97.5 °F (36.4 °C), Max:98.6 °F (37 °C)     Patient was seen and examined on 3/18/20 and physical exam remains unchanged since yesterday, unless noted below:    Lines:  Peripheral IV:       Physical Exam:    General:  Alert, cooperative, well noursished, well developed, appears stated age   Eyes:  Sclera anicteric. Pupils equally round and reactive to light. Mouth/Throat: Mucous membranes normal, oral pharynx clear   Neck: Supple   Lungs:   Clear to auscultation bilaterally, good effort   CV:  Regular rate and rhythm,no murmur, click, rub or gallop   Abdomen:   Soft, non-tender.  bowel sounds normal. non-distended Extremities: L first finger- see photo below   Skin: Skin color, texture, turgor normal. no acute rash or lesions; see wound pics below from 3/16- post op dressing in place today   Lymph nodes: Cervical and supraclavicular normal   Musculoskeletal: No swelling or deformity   Lines/Devices:  Intact, no erythema, drainage or tenderness   Psych: Alert and oriented, normal mood affect given the setting                       Data Review:     CBC:  Recent Labs     03/18/20 0359 03/17/20 0412 03/16/20  0641   WBC 7.7 12.4* 8.6  8.6   GRANS 52 70 59  60   MONOS 12 9 11  11   EOS 1 0* 1  1   ANEU 4.0 8.6* 5.1  5.2   ABL 2.5 2.5 2.3   HGB 14.7 14.8 14.6  14.6   HCT 44.3 44.6 43.6  43.6    222 247  215       BMP:  Recent Labs     03/18/20 0359 03/17/20 0412 03/16/20 0641   CREA 0.67* 0.68* 0.68*   BUN 10 7 11    140 138   K 3.8 3.8 4.1    106 106   CO2 29 29 27   AGAP 5* 5* 5*   * 148* 275*       LFTS:  No results for input(s): TBILI, ALT, SGOT, AP, TP, ALB in the last 72 hours.     Microbiology:     All Micro Results     Procedure Component Value Units Date/Time    CULTURE, BLOOD [[id number]] Collected:  03/15/20 1307    Order Status:  Completed Specimen:  Blood Updated:  03/18/20 0640     Special Requests: --        RIGHT  Antecubital       Culture result: NO GROWTH 3 DAYS       CULTURE, BLOOD [[id number]] Collected:  03/15/20 1420    Order Status:  Completed Specimen:  Blood Updated:  03/18/20 0640     Special Requests: --        LEFT  Antecubital       Culture result: NO GROWTH 3 DAYS       CULTURE, WOUND [place] STAIN [[id number]]  (Abnormal) Collected:  03/16/20 1726    Order Status:  Completed Specimen:  Thumb Updated:  03/17/20 1259     Special Requests: NO SPECIAL REQUESTS        GRAM STAIN NO WBCS SEEN         NO DEFINITE ORGANISM SEEN        Culture result:       LIGHT STAPHYLOCOCCUS AUREUS                  For Susceptibility Refer to Culture  Accession [id number]      CULTURE, [facility] [[id number]]  (Abnormal)  (Susceptibility) Collected:  03/15/20 1145    Order Status:  Completed Specimen:  Wound from Finger Updated:  03/17/20 0846     Special Requests: NO SPECIAL REQUESTS        GRAM STAIN 0 TO 1 WBC'S/OIF      NO EPITHELIAL CELLS SEEN               MODERATE GRAM POS COCCI IN CLUSTERS            FEW GRAM NEGATIVE RODS        Culture result:       HEAVY * METHICILLIN RESISTANT STAPHYLOCOCCUS AUREUS *            SCANT  NORMAL SKIN MELQUIADES ISOLATED               CULTURE IN PROGRESS,FURTHER UPDATES TO FOLLOW                  RESULTS VERIFIED, PHONED TO AND READ BACK BY  Juan Carlos Freeman RN ON 3/17/20 @0850, TA      CULTURE, ANAEROBIC [[id number]] Collected:  03/16/20 1726    Order Status:  Completed Updated:  03/16/20 2011          Imaging:   (3/15) MRI Finger/Hand JT LT w contrast   IMPRESSION:  1. Findings compatible with osteomyelitis of the distal phalanx of the left  first digit. 2. Findings compatible with cellulitis involving the subcutaneous soft tissues  of the left first digit.     Signed By: BC Torres     March 18, 2020

## 2020-03-18 NOTE — PROGRESS NOTES
Progress Note    3/18/2020  Admit Date: 3/15/2020 11:17 AM   NAME: Trino Correa   :  1970   MRN:  [id number]   Attending: Shanique Harris MD  PCP:  Tucker Pizarro MD  Treatment Team: Attending Provider: Shanique Harris MD; Consulting Provider: Jono Aguilar MD; Utilization Review: Sung Mccarty, LIBBY; Care Manager: Dennise Santos LMSW; Charge Nurse: Enzo Harrington RN; Nurse Practitioner: Vanessa Vidal NP; Primary Nurse: Emmett Cunningham Nurse: Shruti De La Cruz    Full Code   SUBJECTIVE:   Mr. Sofia Balbuena is a 45 yo male with PMH of HIV, DM, HTN, paranoid schizophrenia, bipolar, seizures who presented with c/o pain and skin breakdown to his first phalanx left hand after trauma. Ortho consulted. MRI with osteomyelitis. I&D 3/16. Wound cx with MRSA. ID following. On Vanc and Zosyn. Today pt reports feeling better. Able to move thumb. Afebrile, no leukocytosis.       Past Medical History:   Diagnosis Date    Autoimmune disease ([facility] [address]     HIV    Diabetes ([facility] [address]     Hypertension     Infectious disease     HIV    Paranoid schizophrenia ([facility] [address]     Psychiatric disorder     bipolar, paranoid,     Seizures ([facility] [address]      Recent Results (from the past 24 hour(s))   GLUCOSE, POC    Collection Time: 20  3:40 PM   Result Value Ref Range    Glucose (POC) 239 (H) 65 - 100 mg/dL   GLUCOSE, POC    Collection Time: 20  9:35 PM   Result Value Ref Range    Glucose (POC) 343 (H) 65 - 356 mg/dL   METABOLIC PANEL, BASIC    Collection Time: 20  3:59 AM   Result Value Ref Range    Sodium 141 136 - 145 mmol/L    Potassium 3.8 3.5 - 5.1 mmol/L    Chloride 107 98 - 107 mmol/L    CO2 29 21 - 32 mmol/L    Anion gap 5 (L) 7 - 16 mmol/L    Glucose 205 (H) 65 - 100 mg/dL    BUN 10 6 - 23 MG/DL    Creatinine 0.67 (L) 0.8 - 1.5 MG/DL    GFR est AA >60 >60 ml/min/1.73m2    GFR est non-AA >60 >60 ml/min/1.73m2    Calcium 8.2 (L) 8.3 - 10.4 MG/DL   CBC WITH AUTOMATED DIFF    Collection Time: 20 3:59 AM   Result Value Ref Range    WBC 7.7 4.3 - 11.1 K/uL    RBC 5.32 4.23 - 5.6 M/uL    HGB 14.7 13.6 - 17.2 g/dL    HCT 44.3 41.1 - 50.3 %    MCV 83.3 79.6 - 97.8 FL    MCH 27.6 26.1 - 32.9 PG    MCHC 33.2 31.4 - 35.0 g/dL    RDW 13.8 11.9 - 14.6 %    PLATELET 921 759 - 579 K/uL    MPV 9.6 9.4 - 12.3 FL    ABSOLUTE NRBC 0.00 0.0 - 0.2 K/uL    DF AUTOMATED      NEUTROPHILS 52 43 - 78 %    LYMPHOCYTES 33 13 - 44 %    MONOCYTES 12 4.0 - 12.0 %    EOSINOPHILS 1 0.5 - 7.8 %    BASOPHILS 1 0.0 - 2.0 %    IMMATURE GRANULOCYTES 1 0.0 - 5.0 %    ABS. NEUTROPHILS 4.0 1.7 - 8.2 K/UL    ABS. LYMPHOCYTES 2.5 0.5 - 4.6 K/UL    ABS. MONOCYTES 1.0 0.1 - 1.3 K/UL    ABS. EOSINOPHILS 0.1 0.0 - 0.8 K/UL    ABS. BASOPHILS 0.0 0.0 - 0.2 K/UL    ABS. IMM.  GRANS. 0.1 0.0 - 0.5 K/UL   VANCOMYCIN, TROUGH    Collection Time: 03/18/20  3:59 AM   Result Value Ref Range    Vancomycin,trough 13.8 5 - 20 ug/mL   HEMOGLOBIN A1C WITH EAG    Collection Time: 03/18/20  3:59 AM   Result Value Ref Range    Hemoglobin A1c 8.7 (H) 4.8 - 6.0 %    Est. average glucose 203 mg/dL   GLUCOSE, POC    Collection Time: 03/18/20  7:01 AM   Result Value Ref Range    Glucose (POC) 161 (H) 65 - 100 mg/dL     Allergies   Allergen Reactions    Thorazine [Chlorpromazine] Rash     Current Facility-Administered Medications   Medication Dose Route Frequency Provider Last Rate Last Dose    doxycycline (VIBRAMYCIN) capsule 100 mg  100 mg Oral Q12H Bette Mathis FNP        sodium chloride (NS) flush 5-40 mL  5-40 mL IntraVENous Q8H Carlo Escamilla MD   5 mL at 03/18/20 0552    sodium chloride (NS) flush 5-40 mL  5-40 mL IntraVENous PRN Hannah Escamilla MD        acetaminophen (TYLENOL) tablet 650 mg  650 mg Oral Q4H PRN Carlo Escamilla MD        heparin (porcine) injection 5,000 Units  5,000 Units SubCUTAneous Q8H Carlo Escamilla MD   5,000 Units at 03/18/20 0553    insulin glargine (LANTUS) injection 20 Units  20 Units SubCUTAneous QHS Debbi Chao MD   20 Units at 20 2212    insulin regular (NOVOLIN R, HUMULIN R) injection   SubCUTAneous AC&HS Debbi Chao MD   2 Units at 20 0756    nicotine (NICODERM CQ) 21 mg/24 hr patch 1 Patch  1 Patch TransDERmal Q24H Anni Escamilla MD        emtricitabine-tenofovir (TDF) (TRUVADA) 200-300 mg per tablet 1 Tab  1 Tab Oral DAILY Debbi Chao MD   1 Tab at 20 8642    alcohol 62% (NOZIN) nasal  1 Ampule  1 Ampule Topical Q12H Debbi Chao MD   1 Ampule at 20 0947    [Held by provider] haloperidoL (HALDOL) tablet 5 mg  5 mg Oral DAILY Lady Kacie MD   Stopped at 03/15/20 2200    HYDROcodone-acetaminophen (NORCO) 5-325 mg per tablet 1 Tab  1 Tab Oral Q6H PRN Lady Kacie MD   1 Tab at 20 1216    atorvastatin (LIPITOR) tablet 10 mg  10 mg Oral QHS Lady Kacie MD   10 mg at 20    loratadine (CLARITIN) tablet 10 mg  10 mg Oral DAILY PRN Lady Kacie MD        OLANZapine (ZyPREXA) tablet 20 mg  20 mg Oral QHS Lady Kacie MD   20 mg at 20    OXcarbazepine (TRILEPTAL) tablet 300 mg  300 mg Oral Q12H Lady Kacie MD   300 mg at 20 0947    benztropine (COGENTIN) tablet 2 mg  2 mg Oral BID Lady Kacie MD   2 mg at 20 0947    traZODone (DESYREL) tablet 150 mg  150 mg Oral QHS Lady Kacie MD   150 mg at 20       Review of Systems negative with exception of pertinent positives noted above  PHYSICAL EXAM     Visit Vitals  /77 (BP 1 Location: Right arm, BP Patient Position: Sitting)   Pulse (!) 56   Temp 97.6 °F (36.4 °C)   Resp 16   Ht 5' 6\" (1.676 m)   Wt 74.8 kg (165 lb)   SpO2 99%   BMI 26.63 kg/m²      Temp (24hrs), Av °F (36.7 °C), Min:97.5 °F (36.4 °C), Max:98.6 °F (37 °C)    Oxygen Therapy  O2 Sat (%): 99 % (20 0745)  O2 Device: Nasal cannula (20 1845)  O2 Flow Rate (L/min): 2 l/min (20 1845)    Intake/Output Summary (Last 24 hours) at 3/18/2020 1118  Last data filed at 3/18/2020 0827  Gross per 24 hour   Intake 360 ml   Output 1700 ml   Net -1340 ml      General: No acute distress    Lungs: CTA bilaterally. Resp even and nonlabored  Heart:  S1S2 present without murmurs rubs gallops. RRR. No LE edema  Abdomen: Soft, non tender, non distended  Extremities: Moves ext spontaneously. Left thumb with dressing c/d/i, sensorimotor intact. 2+radial pulses bilaterally  Neurologic:  A/O X3.  No focal deficits    Results summary of Diagnostic Studies/Procedures copied from within [facility] EMR:      [address] Problems    Diagnosis Date Noted    Laceration of left thumb with infection 03/15/2020    Schizophrenia ([facility] [address] 03/15/2020    HBV (hepatitis B virus) infection 03/15/2020    HLD (hyperlipidemia) 03/15/2020    HIV (human immunodeficiency virus infection) ([facility] [address] 06/04/2014    Diabetes ([facility] [address] 06/04/2014     Plan:    Osteomyelitis left thumb  S/p I&D  Continue Vanc and Zosyn  ID following  Ortho following  Wound cx with MRSA    HIV  Continue current regimen   ID following    DM II  BGL varying  A1C 8.7  Continue current regimen, adjust as needed    Schizophrenia  Continue current regimen      Notes, labs, VS, diagnostic testing reviewed  Time spent with pt 35 min       DVT Prophylaxis:  Heparin sq  Plan of Care Discussed with: Supervising MD Dr. Ya Molina, care team, pt        Edelmira Francisco NP

## 2020-03-18 NOTE — DISCHARGE SUMMARY
Discharge Summary   Patient ID:  Aneesh Hitchcock  [id number]  1 y.o.  1970  Admit date: 3/15/2020 11:17 AM  Discharge date and time: 3/18/2020  Attending: Madison Wylie MD  PCP:  Liv Pena MD  Treatment Team: Attending Provider: Madison Wylie MD; Consulting Provider: Silvio Dennis MD; Utilization Review: Ant Triplett RN; Care Manager: Sam Nolan LM; Nurse Practitioner: Gutierrez Ivy NP; Primary Nurse: Yo Heath Charge Nurse: Evelin URIOSTEGUI  Principal Diagnosis Laceration of left thumb with infection   Principal Problem:    Laceration of left thumb with infection (3/15/2020)    Active Problems:    Diabetes ([facility] (6/4/2014)      HIV (human immunodeficiency virus infection) ([facility] (6/4/2014)      Schizophrenia ([facility] (3/15/2020)      HBV (hepatitis B virus) infection (3/15/2020)      HLD (hyperlipidemia) (3/15/2020)       * Admission Diagnoses: Laceration of left thumb with infection [S61.012A, L08.9]  * Discharge Diagnoses:    Hospital Problems as of 3/18/2020 Never Reviewed          Codes Class Noted - Resolved POA    * (Principal) Laceration of left thumb with infection ICD-10-CM: Y05.545J, L08.9  ICD-9-CM: 883.1  3/15/2020 - Present Unknown        Schizophrenia ([facility] ICD-10-CM: F20.9  ICD-9-CM: 295.90  3/15/2020 - Present Unknown        HBV (hepatitis B virus) infection ICD-10-CM: B19.10  ICD-9-CM: 070.30  3/15/2020 - Present Unknown        HLD (hyperlipidemia) ICD-10-CM: E78.5  ICD-9-CM: 272.4  3/15/2020 - Present Unknown        Diabetes ([facility] ICD-10-CM: E11.9  ICD-9-CM: 250.00  6/4/2014 - Present Yes        HIV (human immunodeficiency virus infection) ([facility] ICD-10-CM: B20  ICD-9-CM: V08  6/4/2014 - Present Yes                Hospital Course:    Mr. Winston Hill is a 45 yo male with PMH of HIV, DM, HTN, paranoid schizophrenia, bipolar, seizures who presented with c/o pain and skin breakdown to his first phalanx left hand after trauma. Ortho consulted.  MRI with osteomyelitis. I&D 3/16. Wound cx with MRSA. ID following. Started on Vanc and Zosyn. Today pt reports feeling better. Able to move thumb. Afebrile, no leukocytosis. ID changed IV abx to doxycycline 100mg BID X4-6 weeks. FU ID 4/29/20. Pt stable for DC. Diagnostic Study/Procedure results summary copied from within [facility] EMR:  2 Days Post-Op  Procedure(s):  INCISION AND DRAINAGE LEFT THUMB    EXAMINATION: XR THUMB LT MIN 2 V  3/15/2020 10:07 AM     ACCESSION NUMBER:  [id number]     COMPARISON: None available     INDICATION:  thumb injury     TECHNIQUE: 3 views of the left thumb are provided.     FINDINGS:  Bony mineralization is preserved. Joint spacing and alignment are maintained. There is no fracture or acute osseous abnormality. Distortion is seen in the  distal soft tissues of the thumb. There is no radiopaque foreign body. There is  no soft tissue gas.     IMPRESSION  IMPRESSION:  No acute osseous abnormality or retained radiopaque foreign body or soft tissue  gas.       History: Wound involving the distal phalanx of the left first digit. HIV. Trauma. Diabetes.     EXAM: MRI left thumb with and without contrast     TECHNIQUE: Multiplanar multisequence imaging is performed both before and after  the uneventful administration of 15 cc Dotarem.     COMPARISON: Plain film dated 3/15/2020     FINDINGS: There is soft tissue defect, ulceration, seen involving the soft  tissues overlying the distal phalanx of the left first digit. There is low T1  and high T2 signal intensity with enhancement involving the distal phalanx of  the left first digit. There is also edema and enhancement present within the  subcutaneous soft tissues at the level of the distal phalanx. No drainable fluid  collection.     IMPRESSION  IMPRESSION:  1. Findings compatible with osteomyelitis of the distal phalanx of the left  first digit.   2. Findings compatible with cellulitis involving the subcutaneous soft tissues  of the left first digit. Labs: Results:       Chemistry Recent Labs     03/18/20  0359 03/17/20  0412 03/16/20  0641   * 148* 275*    140 138   K 3.8 3.8 4.1    106 106   CO2 29 29 27   BUN 10 7 11   CREA 0.67* 0.68* 0.68*   CA 8.2* 8.5 8.3   AGAP 5* 5* 5*      CBC w/Diff Recent Labs     03/18/20  0359 03/17/20 0412 03/16/20  0641   WBC 7.7 12.4* 8.6  8.6   RBC 5.32 5.38 5.32   HGB 14.7 14.8 14.6  14.6   HCT 44.3 44.6 43.6  43.6    222 247  215   GRANS 52 70 59  60   LYMPH 33 20 27   EOS 1 0* 1  1      Cardiac Enzymes No results for input(s): CPK, CKND1, KRISTY in the last 72 hours. No lab exists for component: CKRMB, TROIP   Coagulation Recent Labs     03/15/20  1530   PTP 13.4   INR 1.0       Lipid Panel @BRIEFLAB(CHOL,CHOLPOCT,157990,223932,[id number],CHOLX,CHOLP,CHLST,CHOLV,358142,HDL,HDLPOC,HDLPOCT,802248,NHDLCT,NTD801498,HDLC,HDLP,LDL,LDLPOCT,LDLCPOC,910779,NLDLCT,DLDL,LDLC,DLDLP,153156,VLDLC,VLDL,TGL,TGLX,TRIGL,[id number],TRIGP,TGLPOCT,126317,913552,CHHD,CHHDX)@   BNP No results for input(s): BNPP in the last 72 hours. Liver Enzymes No results for input(s): TP, ALB, TBIL, AP, SGOT, GPT in the last 72 hours. No lab exists for component: DBIL   Thyroid Studies No results found for: T4, T3U, TSH, TSHEXT         Discharge Exam:  Visit Vitals  /81 (BP 1 Location: Right arm, BP Patient Position: Sitting)   Pulse 63   Temp 98.3 °F (36.8 °C)   Resp 16   Ht 5' 6\" (1.676 m)   Wt 74.8 kg (165 lb)   SpO2 98%   BMI 26.63 kg/m²       General:       No acute distress    Lungs:          CTA bilaterally. Resp even and nonlabored  Heart:            S1S2 present without murmurs rubs gallops. RRR. No LE edema  Abdomen:    Soft, non tender, non distended  Extremities: Moves ext spontaneously. Left thumb with dressing c/d/i, sensorimotor intact. 2+radial pulses bilaterally  Neurologic:  A/O X3.  No focal deficits    Disposition: home  Discharge Condition: stable  Patient Instructions: Current Discharge Medication List      START taking these medications    Details   doxycycline (VIBRAMYCIN) 100 mg capsule Take 1 Cap by mouth every twelve (12) hours for 45 days. Qty: 90 Cap, Refills: 0      HYDROcodone-acetaminophen (NORCO) 5-325 mg per tablet Take 1 Tab by mouth every six (6) hours as needed for Pain for up to 3 days. Max Daily Amount: 4 Tabs. Qty: 12 Tab, Refills: 0    Associated Diagnoses: Laceration of left thumb with infection, initial encounter; Other acute osteomyelitis of left hand ([facility]         CONTINUE these medications which have NOT CHANGED    Details   OXcarbazepine (TRILEPTAL) 300 mg tablet Take 300 mg by mouth every twelve (12) hours. atorvastatin (LIPITOR) 10 mg tablet Take 10 mg by mouth nightly. benztropine (COGENTIN) 2 mg tablet Take 2 mg by mouth two (2) times a day. OLANZapine (ZyPREXA) 20 mg tablet Take 20 mg by mouth nightly. insulin detemir (LEVEMIR) 100 unit/mL injection 30 Units by SubCUTAneous route daily. emtricitabine-tenofovir (TRUVADA) 200-300 mg per tablet Take 1 Tab by mouth daily. TRAZODONE HCL (TRAZODONE PO) Take 300 mg by mouth. Unknown dose       haloperidol (HALDOL) 5 mg tablet Take 5 mg by mouth nightly. METFORMIN HCL (GLUCOPHAGE PO) Take 1,000 mg by mouth two (2) times a day. Cetirizine (ZYRTEC) 10 mg cap Take  by mouth daily as needed.              Activity: Activity as tolerated and No driving while on analgesics  Diet: Cardiac Diet and Diabetic Diet  Wound Care: Keep wound clean and dry, Reinforce dressing PRN and As directed      Full Code   Surrogate decision maker:  Sister    Pneumonia and flu vaccine to be administered at discharge per hospital protocol     Follow-up  ·   FU PCP 2-3 days  · FU ID as scheduled 4/29/20 at 1100  · DC on doxycycline 100mg BID X4-6 weeks  · FU Ortho 10-14 days     Notes, labs, VS, diagnostic testing reviewed  Case discussed with pt, care team, Dr. Demetri Almanza    Narcotics rx in accordance with [place] guidelines.      Time spent to discharge patient 45 min   Signed:  Estela Riley NP  3/18/2020  2:15 PM

## 2020-03-19 ENCOUNTER — PATIENT OUTREACH (OUTPATIENT)
Dept: CASE MANAGEMENT | Age: 50
End: 2020-03-19

## 2020-03-19 NOTE — PROGRESS NOTES
COVID-19 Screening Initial Follow-up Note    Patient contacted regarding COVID-19  risk. Care Transition Nurse/ Ambulatory Care Manager contacted the patient by telephone to perform post discharge assessment. Verified name and  with patient as identifiers. Provided introduction to self, and explanation of the CTN/ACM role, and reason for call due to risk factors for infection and/or exposure to COVID-19. Symptoms reviewed with patient who verbalized the following symptoms:   Fever no    Fatigue no   Pain or aching joints no  Cough no  Shortness of breath no    Confusion or unusual change in mental status no    Chills or shaking no    Sweating no    Fast heart rate no    Fast breathing no    Dizziness/lightheadedness no    Less urine output no    Cold, clammy, and pale skin no  Low body temperature no       Due to onset/worsening of new symptoms, encounter routed to provider for escalation. Patient has following risk factors of:   Immunocompromised  CTN/ACM reviewed discharge instructions, medical action plan and red flags such as increased shortness of breath, increasing fever and signs of decompensation with patient who verbalized understanding. Discussed exposure protocols and quarantine with CDC Guidelines What to do if you are sick with coronavirus disease 2019 Patient who was given an opportunity for questions and concerns. The patient agrees to contact the [facility] exposure line, local health department and PCP office for questions related to their healthcare. CTN provided contact information for future reference. Reviewed and educated patient on any new and changed medications related to discharge diagnosis     Plan for follow-up call in 3-5 days based on severity of symptoms and risk factorsThis note will not be viewable in [employer] [employer].

## 2020-03-20 LAB
BACTERIA SPEC CULT: NORMAL
BACTERIA SPEC CULT: NORMAL
HIV1 RNA # SERPL NAA+PROBE: NORMAL COPIES/ML
HIV1 RNA SERPL NAA+PROBE-LOG#: 5.12 LOG10COPY/ML
SERVICE CMNT-IMP: NORMAL
SERVICE CMNT-IMP: NORMAL

## 2020-03-25 LAB
BACTERIA SPEC CULT: NORMAL
SERVICE CMNT-IMP: NORMAL

## 2020-04-06 ENCOUNTER — PATIENT OUTREACH (OUTPATIENT)
Dept: CASE MANAGEMENT | Age: 50
End: 2020-04-06

## 2020-04-06 NOTE — PROGRESS NOTES
Patient resolved from Transition of Care episode on 4/6/2020  Patient/family has been provided the following resources and education related to COVID-19:                         Signs, symptoms and red flags related to COVID-19            CDC exposure and quarantine guidelines            [facility] exposure contact - [phone]            Contact for their local Department of Health                 Patient currently reports that the following symptoms have improved:  no new symptoms  No further outreach scheduled with this CTN. Episode of Care resolved. Patient has this CTN contact information if future needs arise.

## 2020-05-19 ENCOUNTER — APPOINTMENT (OUTPATIENT)
Dept: MRI IMAGING | Age: 50
End: 2020-05-19
Payer: MEDICARE

## 2020-05-19 ENCOUNTER — HOSPITAL ENCOUNTER (EMERGENCY)
Age: 50
Discharge: HOME | End: 2020-05-19
Attending: EMERGENCY MEDICINE
Payer: MEDICARE

## 2020-05-19 VITALS
HEIGHT: 66 IN | RESPIRATION RATE: 16 BRPM | WEIGHT: 165 LBS | TEMPERATURE: 98.6 F | BODY MASS INDEX: 26.52 KG/M2 | OXYGEN SATURATION: 98 % | DIASTOLIC BLOOD PRESSURE: 68 MMHG | SYSTOLIC BLOOD PRESSURE: 118 MMHG | HEART RATE: 92 BPM

## 2020-05-19 DIAGNOSIS — R73.9 HYPERGLYCEMIA: Primary | ICD-10-CM

## 2020-05-19 LAB
ALBUMIN SERPL-MCNC: 4 G/DL (ref 3.5–5)
ALBUMIN/GLOB SERPL: 1 {RATIO} (ref 1.2–3.5)
ALP SERPL-CCNC: 81 U/L (ref 50–136)
ALT SERPL-CCNC: 78 U/L (ref 12–65)
ANION GAP SERPL CALC-SCNC: 11 MMOL/L (ref 7–16)
AST SERPL-CCNC: 48 U/L (ref 15–37)
BASOPHILS # BLD: 0.1 K/UL (ref 0–0.2)
BASOPHILS NFR BLD: 1 % (ref 0–2)
BILIRUB DIRECT SERPL-MCNC: 0.2 MG/DL
BILIRUB SERPL-MCNC: 0.5 MG/DL (ref 0.2–1.1)
BUN SERPL-MCNC: 11 MG/DL (ref 6–23)
CALCIUM SERPL-MCNC: 9.1 MG/DL (ref 8.3–10.4)
CHLORIDE SERPL-SCNC: 102 MMOL/L (ref 98–107)
CO2 SERPL-SCNC: 20 MMOL/L (ref 21–32)
CREAT SERPL-MCNC: 0.59 MG/DL (ref 0.8–1.5)
DIFFERENTIAL METHOD BLD: ABNORMAL
EOSINOPHIL # BLD: 0.1 K/UL (ref 0–0.8)
EOSINOPHIL NFR BLD: 1 % (ref 0.5–7.8)
ERYTHROCYTE [DISTWIDTH] IN BLOOD BY AUTOMATED COUNT: 14.4 % (ref 11.9–14.6)
GLOBULIN SER CALC-MCNC: 4 G/DL (ref 2.3–3.5)
GLUCOSE BLD STRIP.AUTO-MCNC: 236 MG/DL (ref 65–100)
GLUCOSE SERPL-MCNC: 248 MG/DL (ref 65–100)
HCT VFR BLD AUTO: 47.5 % (ref 41.1–50.3)
HGB BLD-MCNC: 15.7 G/DL (ref 13.6–17.2)
IMM GRANULOCYTES # BLD AUTO: 0.1 K/UL (ref 0–0.5)
IMM GRANULOCYTES NFR BLD AUTO: 1 % (ref 0–5)
LYMPHOCYTES # BLD: 3 K/UL (ref 0.5–4.6)
LYMPHOCYTES NFR BLD: 28 % (ref 13–44)
MCH RBC QN AUTO: 27.5 PG (ref 26.1–32.9)
MCHC RBC AUTO-ENTMCNC: 33.1 G/DL (ref 31.4–35)
MCV RBC AUTO: 83.3 FL (ref 79.6–97.8)
MONOCYTES # BLD: 1 K/UL (ref 0.1–1.3)
MONOCYTES NFR BLD: 9 % (ref 4–12)
NEUTS SEG # BLD: 6.6 K/UL (ref 1.7–8.2)
NEUTS SEG NFR BLD: 61 % (ref 43–78)
NRBC # BLD: 0 K/UL (ref 0–0.2)
PLATELET # BLD AUTO: 231 K/UL (ref 150–450)
PMV BLD AUTO: 9.9 FL (ref 9.4–12.3)
POTASSIUM SERPL-SCNC: 4.3 MMOL/L (ref 3.5–5.1)
PROT SERPL-MCNC: 8 G/DL (ref 6.3–8.2)
RBC # BLD AUTO: 5.7 M/UL (ref 4.23–5.6)
SODIUM SERPL-SCNC: 133 MMOL/L (ref 136–145)
TROPONIN I SERPL-MCNC: <0.02 NG/ML (ref 0.02–0.05)
WBC # BLD AUTO: 10.8 K/UL (ref 4.3–11.1)

## 2020-05-19 PROCEDURE — 81003 URINALYSIS AUTO W/O SCOPE: CPT

## 2020-05-19 PROCEDURE — 99285 EMERGENCY DEPT VISIT HI MDM: CPT

## 2020-05-19 PROCEDURE — 84484 ASSAY OF TROPONIN QUANT: CPT

## 2020-05-19 PROCEDURE — 82962 GLUCOSE BLOOD TEST: CPT

## 2020-05-19 PROCEDURE — 70551 MRI BRAIN STEM W/O DYE: CPT

## 2020-05-19 PROCEDURE — 93005 ELECTROCARDIOGRAM TRACING: CPT | Performed by: PHYSICIAN ASSISTANT

## 2020-05-19 PROCEDURE — 80048 BASIC METABOLIC PNL TOTAL CA: CPT

## 2020-05-19 PROCEDURE — 85025 COMPLETE CBC W/AUTO DIFF WBC: CPT

## 2020-05-19 PROCEDURE — 80076 HEPATIC FUNCTION PANEL: CPT

## 2020-05-19 NOTE — ED TRIAGE NOTES
Pt ambulatory to triage wearing mask. Pt report his blood sugar this morning was 310. Pt states he takes metformin. Pt denies any other symptoms. Pt states his blood sugar usually runs around 210.       in triage

## 2020-05-19 NOTE — ED PROVIDER NOTES
Patient is here with a slightly elevated blood sugar that started last night around 6 PM.  He states he checked it and it was 310. That is higher than normal.  He said he had some slurred speech with that, dizziness and right arm heaviness. Those symptoms have since resolved. He states those are sometimes the symptoms he gets when his blood sugar is elevated. He does use insulin. He has not had a headache, visual changes, neck pain, chest pain, shortness of breath, abdominal pain, swelling/tingling or weakness to his arms or legs, trouble with urination or bowel movements or other new symptoms. He did ambulate to the room and around the emergency room while he was here without difficulty. High Blood Sugar    This is a new problem. The current episode started yesterday. The problem occurs constantly. The problem has not changed since onset. The pain is associated with an unknown factor. Pain location: right arm. The quality of the pain is pressure-like. The pain is at a severity of 0/10. The patient is experiencing no pain. Associated symptoms include nausea. Pertinent negatives include no anorexia, no fever, no belching, no diarrhea, no flatus, no hematochezia, no melena, no vomiting, no constipation, no dysuria, no frequency, no hematuria, no headaches, no arthralgias, no myalgias, no trauma, no chest pain, no testicular pain and no back pain. Associated symptoms comments: Slurred speech and confusion, difficulty walking. Nothing worsens the pain. The pain is relieved by nothing. His past medical history is significant for DM.         Past Medical History:   Diagnosis Date    Autoimmune disease ([address]     HIV    Diabetes ([address]     Hypertension     Infectious disease     HIV    Paranoid schizophrenia ([address]     Psychiatric disorder     bipolar, paranoid,     Seizures ([address]        Past Surgical History:   Procedure Laterality Date    ABDOMEN SURGERY PROC UNLISTED      hernia repair         No family history on file. Social History     Socioeconomic History    Marital status: SINGLE     Spouse name: Not on file    Number of children: Not on file    Years of education: Not on file    Highest education level: Not on file   Occupational History    Not on file   Social Needs    Financial resource strain: Not on file    Food insecurity     Worry: Not on file     Inability: Not on file    Transportation needs     Medical: Not on file     Non-medical: Not on file   Tobacco Use    Smoking status: Current Every Day Smoker     Packs/day: 1.00     Years: 22.00     Pack years: 22.00   Substance and Sexual Activity    Alcohol use: No    Drug use: No    Sexual activity: Not on file   Lifestyle    Physical activity     Days per week: Not on file     Minutes per session: Not on file    Stress: Not on file   Relationships    Social connections     Talks on phone: Not on file     Gets together: Not on file     Attends [employer] service: Not on file     Active member of club or organization: Not on file     Attends meetings of clubs or organizations: Not on file     Relationship status: Not on file    Intimate partner violence     Fear of current or ex partner: Not on file     Emotionally abused: Not on file     Physically abused: Not on file     Forced sexual activity: Not on file   Other Topics Concern    Not on file   Social History Narrative    Not on file         ALLERGIES: Thorazine [chlorpromazine]    Review of Systems   Constitutional: Negative for fever. Cardiovascular: Negative for chest pain. Gastrointestinal: Positive for nausea. Negative for anorexia, constipation, diarrhea, flatus, hematochezia, melena and vomiting. Genitourinary: Negative for dysuria, frequency, hematuria and testicular pain. Musculoskeletal: Negative for arthralgias, back pain and myalgias. \"arm heaviness\"   Neurological: Positive for speech difficulty, weakness and numbness.  Negative for dizziness, tremors, seizures, syncope, facial asymmetry, light-headedness and headaches. Vitals:    05/19/20 1111   BP: 119/69   Pulse: 99   Resp: 16   Temp: 98.7 °F (37.1 °C)   SpO2: 97%   Weight: 74.8 kg (165 lb)   Height: 5' 6\" (1.676 m)            Physical Exam  Constitutional:       General: He is not in acute distress. Appearance: He is well-developed. He is not toxic-appearing. HENT:      Head: Normocephalic and atraumatic. Right Ear: Hearing, tympanic membrane and ear canal normal.      Left Ear: Hearing, tympanic membrane and ear canal normal.      Mouth/Throat:      Pharynx: Uvula midline. Tonsils: No tonsillar exudate. Eyes:      Pupils: Pupils are equal, round, and reactive to light. Neck:      Musculoskeletal: Normal range of motion and neck supple. Cardiovascular:      Rate and Rhythm: Normal rate and regular rhythm. Heart sounds: Normal heart sounds. Pulmonary:      Effort: Pulmonary effort is normal.      Breath sounds: Normal breath sounds. Abdominal:      General: Bowel sounds are normal.      Palpations: Abdomen is soft. Skin:     General: Skin is warm and dry. Capillary Refill: Capillary refill takes less than 2 seconds. Neurological:      Mental Status: He is alert and oriented to person, place, and time. GCS: GCS eye subscore is 4. GCS verbal subscore is 5. GCS motor subscore is 6. Cranial Nerves: Cranial nerves are intact. Sensory: Sensation is intact. Motor: Motor function is intact. Coordination: Coordination is intact. Gait: Gait is intact. Deep Tendon Reflexes:      Reflex Scores:       Tricep reflexes are 2+ on the right side and 2+ on the left side. Bicep reflexes are 2+ on the right side and 2+ on the left side. Brachioradialis reflexes are 2+ on the right side and 2+ on the left side. Patellar reflexes are 2+ on the right side and 2+ on the left side.        Achilles reflexes are 2+ on the right side and 2+ on the left side. Psychiatric:         Behavior: Behavior normal.          MDM       Procedures    12:30 PM Spoke with Dr. Marty Inman regarding patient. We discussed the history, physical exam and work-up. He went to see the patient as well. MRI of the brain was ordered. 3:45 PM patient spoke with Dr. Britton De Jesus who reviewed the MRI and states no acute findings. He believes he could have had an episode of hyper or hypoglycemia and that can cause the symptoms he had. Patient has no deficit at this time, his neurologic exam was normal and he has been ambulating around the ER without difficulty. Patient does have a primary care physician with whom he can follow-up with. He should keep a check of his blood sugars and call them for follow-up. He is stable for discharge and ambulatory out of the ER without difficulty at this time. The patient was observed in the ED.     Results Reviewed:      Recent Results (from the past 24 hour(s))   GLUCOSE, POC    Collection Time: 05/19/20 11:15 AM   Result Value Ref Range    Glucose (POC) 236 (H) 65 - 446 mg/dL   METABOLIC PANEL, BASIC    Collection Time: 05/19/20 12:36 PM   Result Value Ref Range    Sodium 133 (L) 136 - 145 mmol/L    Potassium 4.3 3.5 - 5.1 mmol/L    Chloride 102 98 - 107 mmol/L    CO2 20 (L) 21 - 32 mmol/L    Anion gap 11 7 - 16 mmol/L    Glucose 248 (H) 65 - 100 mg/dL    BUN 11 6 - 23 MG/DL    Creatinine 0.59 (L) 0.8 - 1.5 MG/DL    GFR est AA >60 >60 ml/min/1.73m2    GFR est non-AA >60 >60 ml/min/1.73m2    Calcium 9.1 8.3 - 10.4 MG/DL   CBC WITH AUTOMATED DIFF    Collection Time: 05/19/20 12:36 PM   Result Value Ref Range    WBC 10.8 4.3 - 11.1 K/uL    RBC 5.70 (H) 4.23 - 5.6 M/uL    HGB 15.7 13.6 - 17.2 g/dL    HCT 47.5 41.1 - 50.3 %    MCV 83.3 79.6 - 97.8 FL    MCH 27.5 26.1 - 32.9 PG    MCHC 33.1 31.4 - 35.0 g/dL    RDW 14.4 11.9 - 14.6 %    PLATELET 361 286 - 291 K/uL    MPV 9.9 9.4 - 12.3 FL    ABSOLUTE NRBC 0.00 0.0 - 0.2 K/uL    DF AUTOMATED NEUTROPHILS 61 43 - 78 %    LYMPHOCYTES 28 13 - 44 %    MONOCYTES 9 4.0 - 12.0 %    EOSINOPHILS 1 0.5 - 7.8 %    BASOPHILS 1 0.0 - 2.0 %    IMMATURE GRANULOCYTES 1 0.0 - 5.0 %    ABS. NEUTROPHILS 6.6 1.7 - 8.2 K/UL    ABS. LYMPHOCYTES 3.0 0.5 - 4.6 K/UL    ABS. MONOCYTES 1.0 0.1 - 1.3 K/UL    ABS. EOSINOPHILS 0.1 0.0 - 0.8 K/UL    ABS. BASOPHILS 0.1 0.0 - 0.2 K/UL    ABS. IMM. GRANS. 0.1 0.0 - 0.5 K/UL   TROPONIN I    Collection Time: 05/19/20 12:36 PM   Result Value Ref Range    Troponin-I, Qt. <0.02 (L) 0.02 - 0.05 NG/ML   HEPATIC FUNCTION PANEL    Collection Time: 05/19/20 12:36 PM   Result Value Ref Range    Protein, total 8.0 6.3 - 8.2 g/dL    Albumin 4.0 3.5 - 5.0 g/dL    Globulin 4.0 (H) 2.3 - 3.5 g/dL    A-G Ratio 1.0 (L) 1.2 - 3.5      Bilirubin, total 0.5 0.2 - 1.1 MG/DL    Bilirubin, direct 0.2 <0.4 MG/DL    Alk. phosphatase 81 50 - 136 U/L    AST (SGOT) 48 (H) 15 - 37 U/L    ALT (SGPT) 78 (H) 12 - 65 U/L   EKG, 12 LEAD, INITIAL    Collection Time: 05/19/20  1:06 PM   Result Value Ref Range    Ventricular Rate 82 BPM    Atrial Rate 82 BPM    P-R Interval 148 ms    QRS Duration 90 ms    Q-T Interval 374 ms    QTC Calculation (Bezet) 436 ms    Calculated P Axis 76 degrees    Calculated R Axis 63 degrees    Calculated T Axis 32 degrees    Diagnosis       Normal sinus rhythm  Normal ECG  When compared with ECG of 24-APR-2009 03:34,  T wave inversion no longer evident in Inferior leads  Nonspecific T wave abnormality no longer evident in Lateral leads  QT has shortened         I discussed the results of all labs, procedures, radiographs, and treatments with the patient and available family. Treatment plan is agreed upon and the patient is ready for discharge. All voiced understanding of the discharge plan and medication instructions or changes as appropriate. Questions about treatment in the ED were answered. All were encouraged to return should symptoms worsen or new problems develop.

## 2020-05-19 NOTE — LETTER
[facility] EMERGENCY DEPT 
[address] [facility] [address] 
[phone] Work/School Note Date: 5/19/2020 To Whom It May concern: 
 
Jerri Nielsen was seen and treated today in the emergency room by the following provider(s): 
Attending Provider: Rina Villatoro MD 
Physician Assistant: DRE Michelle.   
 
Jerri Nielsen may return to work on 05/20/20. Sincerely, DRE Leo

## 2020-05-19 NOTE — ED NOTES
I have reviewed discharge instructions with the patient. The patient verbalized understanding. Patient left ED via Discharge Method: ambulatory to Home with self. Opportunity for questions and clarification provided. Patient given 0 scripts. To continue your aftercare when you leave the hospital, you may receive an automated call from our care team to check in on how you are doing. This is a free service and part of our promise to provide the best care and service to meet your aftercare needs.  If you have questions, or wish to unsubscribe from this service please call [phone]. Thank you for Choosing our [facility] Emergency Department.

## 2020-05-19 NOTE — DISCHARGE INSTRUCTIONS
Patient Education        Learning About High Blood Sugar  What is high blood sugar? Your body turns the food you eat into glucose (sugar), which it uses for energy. But if your body isn't able to use the sugar right away, it can build up in your blood and lead to high blood sugar. When the amount of sugar in your blood stays too high for too much of the time, you may have diabetes. Diabetes is a disease that can cause serious health problems. The good news is that lifestyle changes may help you get your blood sugar back to normal and avoid or delay diabetes. What causes high blood sugar? Sugar (glucose) can build up in your blood if you:  · Are overweight. · Have a family history of diabetes. · Take certain medicines, such as steroids. What are the symptoms? Having high blood sugar may not cause any symptoms at all. Or it may make you feel very thirsty or very hungry. You may also urinate more often than usual, have blurry vision, or lose weight without trying. How is high blood sugar treated? You can take steps to lower your blood sugar level if you understand what makes it get higher. Your doctor may want you to learn how to test your blood sugar level at home. Then you can see how illness, stress, or different kinds of food or medicine raise or lower your blood sugar level. Other tests may be needed to see if you have diabetes. How can you prevent high blood sugar? · Watch your weight. If you're overweight, losing just a small amount of weight may help. Reducing fat around your waist is most important. · Limit the amount of calories, sweets, and unhealthy fat you eat. Ask your doctor if a dietitian can help you. A registered dietitian can help you create meal plans that fit your lifestyle. · Get at least 30 minutes of exercise on most days of the week. Exercise helps control your blood sugar. It also helps you maintain a healthy weight. Walking is a good choice.  You also may want to do other activities, such as running, swimming, cycling, or playing tennis or team sports. · If your doctor prescribed medicines, take them exactly as prescribed. Call your doctor if you think you are having a problem with your medicine. You will get more details on the specific medicines your doctor prescribes. Follow-up care is a key part of your treatment and safety. Be sure to make and go to all appointments, and call your doctor if you are having problems. It's also a good idea to know your test results and keep a list of the medicines you take. Where can you learn more? Go to http://cierra-derrell.info/  Enter O108 in the search box to learn more about \"Learning About High Blood Sugar. \"  Current as of: December 19, 2019Content Version: 12.4  © 1403-9009 Healthwise, Incorporated. Care instructions adapted under license by [employer] (which disclaims liability or warranty for this information). If you have questions about a medical condition or this instruction, always ask your healthcare professional. [address] any warranty or liability for your use of this information.

## 2020-05-20 ENCOUNTER — PATIENT OUTREACH (OUTPATIENT)
Dept: CASE MANAGEMENT | Age: 50
End: 2020-05-20

## 2020-05-21 LAB
ATRIAL RATE: 82 BPM
CALCULATED P AXIS, ECG09: 76 DEGREES
CALCULATED R AXIS, ECG10: 63 DEGREES
CALCULATED T AXIS, ECG11: 32 DEGREES
DIAGNOSIS, 93000: NORMAL
P-R INTERVAL, ECG05: 148 MS
Q-T INTERVAL, ECG07: 374 MS
QRS DURATION, ECG06: 90 MS
QTC CALCULATION (BEZET), ECG08: 436 MS
VENTRICULAR RATE, ECG03: 82 BPM

## 2020-06-12 ENCOUNTER — PATIENT OUTREACH (OUTPATIENT)
Dept: CASE MANAGEMENT | Age: 50
End: 2020-06-12

## 2020-06-12 NOTE — PROGRESS NOTES
Patient resolved from Transition of Care episode on 6/12/2020  Discussed COVID-19 related testing which was not done at this time. Test results were not done. Patient informed of results, if available? Test not performed     Patient/family has been provided the following resources and education related to COVID-19:                         Signs, symptoms and red flags related to COVID-19            [facility] exposure and quarantine guidelines            [facility] exposure contact - [phone]            Contact for their local Department of Health                 Patient currently reports that the following symptoms have improved:  no new or worsening symptoms. No further outreach scheduled with this CTN/ACM/LPN/HC/ MA. Episode of Care resolved. Patient has this CTN/ACM/LPN/HC/MA contact information if future needs arise.

## 2020-09-17 ENCOUNTER — APPOINTMENT (OUTPATIENT)
Dept: GENERAL RADIOLOGY | Age: 50
End: 2020-09-17
Attending: EMERGENCY MEDICINE
Payer: MEDICARE

## 2020-09-17 ENCOUNTER — HOSPITAL ENCOUNTER (EMERGENCY)
Age: 50
Discharge: HOME | End: 2020-09-17
Attending: EMERGENCY MEDICINE
Payer: MEDICARE

## 2020-09-17 DIAGNOSIS — F20.0 PARANOID SCHIZOPHRENIA (HCC): ICD-10-CM

## 2020-09-17 DIAGNOSIS — J45.909 UNCOMPLICATED ASTHMA, UNSPECIFIED ASTHMA SEVERITY, UNSPECIFIED WHETHER PERSISTENT: Primary | ICD-10-CM

## 2020-09-17 DIAGNOSIS — B20 HIV INFECTION, UNSPECIFIED SYMPTOM STATUS (HCC): ICD-10-CM

## 2020-09-17 DIAGNOSIS — Z76.0 MEDICATION REFILL: ICD-10-CM

## 2020-09-17 PROCEDURE — 99282 EMERGENCY DEPT VISIT SF MDM: CPT

## 2020-09-17 PROCEDURE — 71046 X-RAY EXAM CHEST 2 VIEWS: CPT

## 2020-09-17 RX ORDER — ALBUTEROL SULFATE 90 UG/1
2 AEROSOL, METERED RESPIRATORY (INHALATION)
Qty: 2 INHALER | Refills: 11 | Status: SHIPPED | OUTPATIENT
Start: 2020-09-17

## 2020-09-17 NOTE — ED PROVIDER NOTES
Patient has a history of hypertension, diabetes, HIV, paranoid schizophrenia and asthma. He is a smoker. He states that he has been short of breath since yesterday. He ran out of his albuterol inhaler \"a while back\". He states he got short of breath last night which continued to today. He states he now is feeling better. He denies any cough or fever. He has not taken any medicine for his symptoms. He was wheezing last night but that has resolved today. Past Medical History:   Diagnosis Date    Autoimmune disease ([facility]     HIV    Diabetes ([facility]     Hypertension     Infectious disease     HIV    [facility]     Psychiatric disorder     bipolar, paranoid,     Seizures ([facility]        Past Surgical History:   Procedure Laterality Date    ABDOMEN SURGERY PROC UNLISTED      hernia repair         No family history on file.     Social History     Socioeconomic History    Marital status: SINGLE     Spouse name: Not on file    Number of children: Not on file    Years of education: Not on file    Highest education level: Not on file   Occupational History    Not on file   Social Needs    Financial resource strain: Not on file    Food insecurity     Worry: Not on file     Inability: Not on file    Transportation needs     Medical: Not on file     Non-medical: Not on file   Tobacco Use    Smoking status: Current Every Day Smoker     Packs/day: 1.00     Years: 22.00     Pack years: 22.00   Substance and Sexual Activity    Alcohol use: No    Drug use: No    Sexual activity: Not on file   Lifestyle    Physical activity     Days per week: Not on file     Minutes per session: Not on file    Stress: Not on file   Relationships    Social connections     Talks on phone: Not on file     Gets together: Not on file     Attends Taoism service: Not on file     Active member of club or organization: Not on file     Attends meetings of clubs or organizations: Not on file     Relationship status: Not on file    Intimate partner violence     Fear of current or ex partner: Not on file     Emotionally abused: Not on file     Physically abused: Not on file     Forced sexual activity: Not on file   Other Topics Concern    Not on file   Social History Narrative    Not on file         ALLERGIES: Thorazine [chlorpromazine]    Review of Systems   Constitutional: Negative for chills and fever. Gastrointestinal: Negative for nausea and vomiting. All other systems reviewed and are negative. There were no vitals filed for this visit. Physical Exam  Vitals signs and nursing note reviewed. Constitutional:       Appearance: Normal appearance. He is well-developed. HENT:      Head: Normocephalic and atraumatic. Eyes:      Conjunctiva/sclera: Conjunctivae normal.      Pupils: Pupils are equal, round, and reactive to light. Neck:      Musculoskeletal: Normal range of motion and neck supple. Cardiovascular:      Rate and Rhythm: Normal rate and regular rhythm. Pulmonary:      Effort: Pulmonary effort is normal. No respiratory distress. Breath sounds: No wheezing or rales. Musculoskeletal: Normal range of motion. Skin:     General: Skin is warm and dry. Neurological:      Mental Status: He is alert and oriented to person, place, and time.           MDM  Number of Diagnoses or Management Options  HIV infection, unspecified symptom status ([facility]):   Medication refill: new and does not require workup  Paranoid schizophrenia ([facility]):   Uncomplicated asthma, unspecified asthma severity, unspecified whether persistent:   Risk of Complications, Morbidity, and/or Mortality  Presenting problems: low  Diagnostic procedures: low  Management options: low    Patient Progress  Patient progress: stable         Procedures

## 2020-09-17 NOTE — ED TRIAGE NOTES
PT ambulatory to triage complaining of shortness of breath. Episode started last night, admits to smoking cigarettes. Denies cough but admits to body aches. Denies exposure to covid.  Pt later admits that he was short of breath in his apt after seeing a copper head but denies shob now

## 2020-09-17 NOTE — ED NOTES
I have reviewed discharge instructions with the patient. The patient verbalized understanding. Patient left ED via Discharge Method: ambulatory to Home with alone in no distress. Opportunity for questions and clarification provided. Patient given 1 scripts. To continue your aftercare when you leave the hospital, you may receive an automated call from our care team to check in on how you are doing. This is a free service and part of our promise to provide the best care and service to meet your aftercare needs.  If you have questions, or wish to unsubscribe from this service please call [phone]. Thank you for Choosing our [facility] Emergency Department.

## 2020-11-24 NOTE — PROGRESS NOTES
Chart screened by  for potential discharge needs or concerns. None identified at this time. Please notify/consult  if any discharge needs arise. Care Management Interventions  PCP Verified by CM:  Yes  Mode of Transport at Discharge: Self  Discharge Durable Medical Equipment: No  Physical Therapy Consult: No  Occupational Therapy Consult: No  Current Support Network: Own Home  Confirm Follow Up Transport: Self(also has access to the [facility])  Discharge Location  Discharge Placement: Home Negative

## 2022-02-13 ENCOUNTER — HOSPITAL ENCOUNTER (EMERGENCY)
Age: 52
Discharge: HOME | DRG: 637 | End: 2022-02-13
Attending: EMERGENCY MEDICINE
Payer: MEDICARE

## 2022-02-13 VITALS
HEART RATE: 70 BPM | TEMPERATURE: 98.1 F | BODY MASS INDEX: 26.16 KG/M2 | OXYGEN SATURATION: 97 % | WEIGHT: 157 LBS | HEIGHT: 65 IN | DIASTOLIC BLOOD PRESSURE: 63 MMHG | SYSTOLIC BLOOD PRESSURE: 90 MMHG | RESPIRATION RATE: 15 BRPM

## 2022-02-13 DIAGNOSIS — R44.0 AUDITORY HALLUCINATION: ICD-10-CM

## 2022-02-13 DIAGNOSIS — F20.9 SCHIZOPHRENIA, UNSPECIFIED TYPE (HCC): Primary | ICD-10-CM

## 2022-02-13 LAB
BASOPHILS # BLD: 0.1 K/UL (ref 0–0.2)
BASOPHILS NFR BLD: 1 % (ref 0–2)
DIFFERENTIAL METHOD BLD: ABNORMAL
EOSINOPHIL # BLD: 0 K/UL (ref 0–0.8)
EOSINOPHIL NFR BLD: 0 % (ref 0.5–7.8)
ERYTHROCYTE [DISTWIDTH] IN BLOOD BY AUTOMATED COUNT: 14.8 % (ref 11.9–14.6)
ETHANOL SERPL-MCNC: <3 MG/DL
HCT VFR BLD AUTO: 45.4 % (ref 41.1–50.3)
HGB BLD-MCNC: 15 G/DL (ref 13.6–17.2)
IMM GRANULOCYTES # BLD AUTO: 0 K/UL (ref 0–0.5)
IMM GRANULOCYTES NFR BLD AUTO: 0 % (ref 0–5)
LYMPHOCYTES # BLD: 1.7 K/UL (ref 0.5–4.6)
LYMPHOCYTES NFR BLD: 27 % (ref 13–44)
MCH RBC QN AUTO: 25.5 PG (ref 26.1–32.9)
MCHC RBC AUTO-ENTMCNC: 33 G/DL (ref 31.4–35)
MCV RBC AUTO: 77.1 FL (ref 79.6–97.8)
MONOCYTES # BLD: 0.5 K/UL (ref 0.1–1.3)
MONOCYTES NFR BLD: 8 % (ref 4–12)
NEUTS SEG # BLD: 4.1 K/UL (ref 1.7–8.2)
NEUTS SEG NFR BLD: 64 % (ref 43–78)
NRBC # BLD: 0 K/UL (ref 0–0.2)
PLATELET # BLD AUTO: 161 K/UL (ref 150–450)
PMV BLD AUTO: 10.4 FL (ref 9.4–12.3)
RBC # BLD AUTO: 5.89 M/UL (ref 4.23–5.6)
SALICYLATES SERPL-MCNC: 3.5 MG/DL (ref 2.8–20)
WBC # BLD AUTO: 6.4 K/UL (ref 4.3–11.1)

## 2022-02-13 PROCEDURE — 82077 ASSAY SPEC XCP UR&BREATH IA: CPT

## 2022-02-13 PROCEDURE — 80179 DRUG ASSAY SALICYLATE: CPT

## 2022-02-13 PROCEDURE — 99283 EMERGENCY DEPT VISIT LOW MDM: CPT

## 2022-02-13 PROCEDURE — 80053 COMPREHEN METABOLIC PANEL: CPT

## 2022-02-13 PROCEDURE — 80143 DRUG ASSAY ACETAMINOPHEN: CPT

## 2022-02-13 PROCEDURE — 85025 COMPLETE CBC W/AUTO DIFF WBC: CPT

## 2022-02-13 PROCEDURE — 83735 ASSAY OF MAGNESIUM: CPT

## 2022-02-13 NOTE — DISCHARGE INSTRUCTIONS
Follow-up with the mental [place] next week, Tuesday, to see what other medicines they might be able to add to your Zyprexa    Return to the ER if worse in any way particularly if you feel you are in danger of doing something to harm yourself

## 2022-02-13 NOTE — ED PROVIDER NOTES
49-year-old gentleman with a history of schizophrenia on Zyprexa 20 mg nightly presents to the ER with worsening auditory hallucinations. He hears voices and thinks of cutting himself with knives in his apartment. Patient does not wish to harm himself. He reports longstanding hallucinations but they seem to be worse here recently as of today specifically, though he cannot describe any specific inciting factor. He admits he has missed his last few appointments the [facility] but states he is still taking his medicines    No physical complaints           Past Medical History:   Diagnosis Date    Autoimmune disease ([facility]     HIV    Diabetes ([facility]     Hypertension     Infectious disease     HIV    Paranoid schizophrenia ([facility]     Psychiatric disorder     bipolar, paranoid,     Seizures ([facility]        Past Surgical History:   Procedure Laterality Date    ABDOMEN SURGERY PROC UNLISTED      hernia repair         No family history on file. Social History     Socioeconomic History    Marital status: SINGLE     Spouse name: Not on file    Number of children: Not on file    Years of education: Not on file    Highest education level: Not on file   Occupational History    Not on file   Tobacco Use    Smoking status: Current Every Day Smoker     Packs/day: 1.00     Years: 22.00     Pack years: 22.00    Smokeless tobacco: Not on file   Substance and Sexual Activity    Alcohol use: No    Drug use: No    Sexual activity: Not on file   Other Topics Concern    Not on file   Social History Narrative    Not on file     Social Determinants of Health     Financial Resource Strain:     Difficulty of Paying Living Expenses: Not on file   Food Insecurity:     Worried About Running Out of Food in the Last Year: Not on file    Venus of Food in the Last Year: Not on file   Transportation Needs:     Lack of Transportation (Medical): Not on file    Lack of Transportation (Non-Medical):  Not on file Physical Activity:     Days of Exercise per Week: Not on file    Minutes of Exercise per Session: Not on file   Stress:     Feeling of Stress : Not on file   Social Connections:     Frequency of Communication with Friends and Family: Not on file    Frequency of Social Gatherings with Friends and Family: Not on file    Attends Pentecostal Services: Not on file    Active Member of [address] [place] [address] or Organizations: Not on file    Attends Club or Organization Meetings: Not on file    Marital Status: Not on file   Intimate Partner Violence:     Fear of Current or Ex-Partner: Not on file    Emotionally Abused: Not on file    Physically Abused: Not on file    Sexually Abused: Not on file   Housing Stability:     Unable to Pay for Housing in the Last Year: Not on file    Number of [place] in the Last Year: Not on file    Unstable Housing in the Last Year: Not on file         ALLERGIES: Thorazine [chlorpromazine]    Review of Systems   Unable to perform ROS: Psychiatric disorder   Constitutional: Negative for fever. Respiratory: Negative for shortness of breath. Cardiovascular: Negative for chest pain. Gastrointestinal: Negative for abdominal pain and vomiting. Psychiatric/Behavioral: Positive for hallucinations and suicidal ideas. All other systems reviewed and are negative. Vitals:    02/13/22 1713 02/13/22 1728   BP: 90/63    Pulse: 70    Resp: 15    Temp: 98.1 °F (36.7 °C)    SpO2: 97%    Weight:  71.2 kg (157 lb)   Height:  5' 5\" (1.651 m)            Physical Exam  Vitals and nursing note reviewed. Constitutional:       General: He is not in acute distress. Appearance: Normal appearance. He is well-developed. He is not ill-appearing, toxic-appearing or diaphoretic. HENT:      Head: Normocephalic and atraumatic. Right Ear: External ear normal.      Left Ear: External ear normal.   Eyes:      General:         Right eye: No discharge. Left eye: No discharge. Conjunctiva/sclera: Conjunctivae normal.   Pulmonary:      Effort: Pulmonary effort is normal. No respiratory distress. Musculoskeletal:         General: Normal range of motion. Cervical back: Normal range of motion and neck supple. Skin:     General: Skin is warm and dry. Findings: No rash. Neurological:      General: No focal deficit present. Mental Status: He is alert and oriented to person, place, and time. Mental status is at baseline. Motor: No abnormal muscle tone. Comments: cni 2-12 grossly  Nl gait,  Nl speech     Psychiatric:         Mood and Affect: Mood normal.         Behavior: Behavior normal.          MDM  Number of Diagnoses or Management Options  Auditory hallucination: established and worsening  Schizophrenia, unspecified type ([facility]): established and worsening  Diagnosis management comments: Medical decision making note:  Schizophrenia with worsening hallucinations, patient does not feel in jeopardy or that he is close to doing anything to harm himself, he is comfortable follow-up with the [facility]. I offered him a telepsychiatry visit to see if they had any medicine change recommendations, he says he prefers to wait and see the [facility] with this coming week. This concludes the \"medical decision making note\" part of this emergency department visit note.          Amount and/or Complexity of Data Reviewed  Clinical lab tests: reviewed and ordered  Decide to obtain previous medical records or to obtain history from someone other than the patient: yes    Risk of Complications, Morbidity, and/or Mortality  Presenting problems: moderate  Diagnostic procedures: minimal  Management options: minimal    Patient Progress  Patient progress: stable         Procedures

## 2022-02-13 NOTE — ED TRIAGE NOTES
Patient arrives to ED from home. Patient reports he is suicidal. Patient reports he has thoughts of cutting himself. Patient reports he is hearing voices. Denies HI. Patient sees [facility]. Patient has history of Schizophrenia.

## 2022-02-14 LAB
ALBUMIN SERPL-MCNC: 3.5 G/DL (ref 3.5–5)
ALBUMIN/GLOB SERPL: 0.9 {RATIO} (ref 1.2–3.5)
ALP SERPL-CCNC: 84 U/L (ref 50–136)
ALT SERPL-CCNC: 26 U/L (ref 12–65)
ANION GAP SERPL CALC-SCNC: 11 MMOL/L (ref 7–16)
APAP SERPL-MCNC: <10 UG/ML (ref 10–30)
AST SERPL-CCNC: 17 U/L (ref 15–37)
BILIRUB SERPL-MCNC: 0.5 MG/DL (ref 0.2–1.1)
BUN SERPL-MCNC: 18 MG/DL (ref 6–23)
CALCIUM SERPL-MCNC: 9.3 MG/DL (ref 8.3–10.4)
CHLORIDE SERPL-SCNC: 93 MMOL/L (ref 98–107)
CO2 SERPL-SCNC: 22 MMOL/L (ref 21–32)
CREAT SERPL-MCNC: 1.1 MG/DL (ref 0.8–1.5)
GLOBULIN SER CALC-MCNC: 3.7 G/DL (ref 2.3–3.5)
GLUCOSE SERPL-MCNC: 763 MG/DL (ref 65–100)
POTASSIUM SERPL-SCNC: 4.3 MMOL/L (ref 3.5–5.1)
PROT SERPL-MCNC: 7.2 G/DL (ref 6.3–8.2)
SODIUM SERPL-SCNC: 126 MMOL/L (ref 138–145)

## 2022-02-14 NOTE — ED NOTES
I have reviewed discharge instructions with the patient. The patient verbalized understanding. Patient left ED via Discharge Method: ambulatory to Home with self. Opportunity for questions and clarification provided. Patient given 0 scripts. To continue your aftercare when you leave the hospital, you may receive an automated call from our care team to check in on how you are doing. This is a free service and part of our promise to provide the best care and service to meet your aftercare needs.  If you have questions, or wish to unsubscribe from this service please call [phone]. Thank you for Choosing our [facility] Emergency Department.

## 2022-02-15 LAB — MAGNESIUM SERPL-MCNC: 1.9 MG/DL (ref 1.6–2.3)

## 2022-02-16 ENCOUNTER — APPOINTMENT (OUTPATIENT)
Dept: GENERAL RADIOLOGY | Age: 52
DRG: 637 | End: 2022-02-16
Attending: INTERNAL MEDICINE
Payer: MEDICARE

## 2022-02-16 ENCOUNTER — HOSPITAL ENCOUNTER (INPATIENT)
Age: 52
LOS: 7 days | Discharge: NURSING, REHAB OR LONG-TERM CARE | DRG: 637 | End: 2022-02-23
Attending: EMERGENCY MEDICINE | Admitting: INTERNAL MEDICINE
Payer: MEDICARE

## 2022-02-16 ENCOUNTER — APPOINTMENT (OUTPATIENT)
Dept: CT IMAGING | Age: 52
DRG: 637 | End: 2022-02-16
Attending: EMERGENCY MEDICINE
Payer: MEDICARE

## 2022-02-16 DIAGNOSIS — N17.9 AKI (ACUTE KIDNEY INJURY) (HCC): Primary | ICD-10-CM

## 2022-02-16 DIAGNOSIS — E11.01 HYPERGLYCEMIC HYPEROSMOLAR NONKETOTIC COMA (HCC): ICD-10-CM

## 2022-02-16 DIAGNOSIS — G93.41 ACUTE METABOLIC ENCEPHALOPATHY: ICD-10-CM

## 2022-02-16 DIAGNOSIS — J01.90 ACUTE NON-RECURRENT SINUSITIS, UNSPECIFIED LOCATION: ICD-10-CM

## 2022-02-16 PROBLEM — A41.9 SEPSIS (HCC): Status: ACTIVE | Noted: 2022-02-16

## 2022-02-16 PROBLEM — E11.10 DKA (DIABETIC KETOACIDOSIS) (HCC): Status: ACTIVE | Noted: 2022-02-16

## 2022-02-16 LAB
ADMINISTERED INITIALS, ADMINIT: AC
ADMINISTERED INITIALS, ADMINIT: NORMAL
ALBUMIN SERPL-MCNC: 4.7 G/DL (ref 3.5–5)
ALBUMIN/GLOB SERPL: 1 {RATIO} (ref 1.2–3.5)
ALP SERPL-CCNC: 117 U/L (ref 50–136)
ALT SERPL-CCNC: 35 U/L (ref 12–65)
AMMONIA PLAS-SCNC: 33 UMOL/L (ref 11–32)
ANION GAP SERPL CALC-SCNC: 13 MMOL/L (ref 7–16)
ANION GAP SERPL CALC-SCNC: 15 MMOL/L (ref 7–16)
ANION GAP SERPL CALC-SCNC: 17 MMOL/L (ref 7–16)
ANION GAP SERPL CALC-SCNC: 5 MMOL/L (ref 7–16)
ARTERIAL PATENCY WRIST A: NEGATIVE
AST SERPL-CCNC: 14 U/L (ref 15–37)
ATRIAL RATE: 113 BPM
BASE DEFICIT BLDV-SCNC: 4.9 MMOL/L
BASOPHILS # BLD: 0.1 K/UL (ref 0–0.2)
BASOPHILS NFR BLD: 0 % (ref 0–2)
BILIRUB SERPL-MCNC: 0.7 MG/DL (ref 0.2–1.1)
BUN SERPL-MCNC: 28 MG/DL (ref 6–23)
BUN SERPL-MCNC: 28 MG/DL (ref 6–23)
BUN SERPL-MCNC: 30 MG/DL (ref 6–23)
BUN SERPL-MCNC: 33 MG/DL (ref 6–23)
CALCIUM SERPL-MCNC: 10.5 MG/DL (ref 8.3–10.4)
CALCIUM SERPL-MCNC: 10.6 MG/DL (ref 8.3–10.4)
CALCIUM SERPL-MCNC: 10.6 MG/DL (ref 8.3–10.4)
CALCIUM SERPL-MCNC: 11.5 MG/DL (ref 8.3–10.4)
CALCULATED P AXIS, ECG09: 72 DEGREES
CALCULATED R AXIS, ECG10: 53 DEGREES
CALCULATED T AXIS, ECG11: 40 DEGREES
CHLORIDE SERPL-SCNC: 113 MMOL/L (ref 98–107)
CHLORIDE SERPL-SCNC: 119 MMOL/L (ref 98–107)
CHLORIDE SERPL-SCNC: 126 MMOL/L (ref 98–107)
CHLORIDE SERPL-SCNC: 96 MMOL/L (ref 98–107)
CO2 SERPL-SCNC: 21 MMOL/L (ref 21–32)
CO2 SERPL-SCNC: 22 MMOL/L (ref 21–32)
CO2 SERPL-SCNC: 26 MMOL/L (ref 21–32)
CO2 SERPL-SCNC: 29 MMOL/L (ref 21–32)
COVID-19 RAPID TEST, COVR: NOT DETECTED
CREAT SERPL-MCNC: 1.6 MG/DL (ref 0.8–1.5)
CREAT SERPL-MCNC: 1.7 MG/DL (ref 0.8–1.5)
CREAT SERPL-MCNC: 1.9 MG/DL (ref 0.8–1.5)
CREAT SERPL-MCNC: 1.9 MG/DL (ref 0.8–1.5)
D50 ADMINISTERED, D50ADM: 0 ML
D50 ORDER, D50ORD: 0 ML
DIAGNOSIS, 93000: NORMAL
DIFFERENTIAL METHOD BLD: ABNORMAL
EOSINOPHIL # BLD: 0.1 K/UL (ref 0–0.8)
EOSINOPHIL NFR BLD: 1 % (ref 0.5–7.8)
ERYTHROCYTE [DISTWIDTH] IN BLOOD BY AUTOMATED COUNT: 18.2 % (ref 11.9–14.6)
EST. AVERAGE GLUCOSE BLD GHB EST-MCNC: ABNORMAL MG/DL
ETHANOL SERPL-MCNC: <3 MG/DL
GAS FLOW.O2 O2 DELIVERY SYS: ABNORMAL L/MIN
GLOBULIN SER CALC-MCNC: 4.7 G/DL (ref 2.3–3.5)
GLSCOM COMMENTS: NORMAL
GLUCOSE BLD STRIP.AUTO-MCNC: 364 MG/DL (ref 65–100)
GLUCOSE BLD STRIP.AUTO-MCNC: 443 MG/DL (ref 65–100)
GLUCOSE BLD STRIP.AUTO-MCNC: 459 MG/DL (ref 65–100)
GLUCOSE BLD STRIP.AUTO-MCNC: 508 MG/DL (ref 65–100)
GLUCOSE SERPL-MCNC: 1047 MG/DL (ref 65–100)
GLUCOSE SERPL-MCNC: 1410 MG/DL (ref 65–100)
GLUCOSE SERPL-MCNC: 448 MG/DL (ref 65–100)
GLUCOSE SERPL-MCNC: 738 MG/DL (ref 65–100)
GLUCOSE, GLC: 364 MG/DL
GLUCOSE, GLC: 443 MG/DL
GLUCOSE, GLC: 459 MG/DL
GLUCOSE, GLC: 508 MG/DL
GLUCOSE, GLC: 601 MG/DL
HBA1C MFR BLD: >16 % (ref 4.2–6.3)
HCO3 BLDV-SCNC: 19.6 MMOL/L (ref 23–28)
HCT VFR BLD AUTO: 56.3 % (ref 41.1–50.3)
HGB BLD-MCNC: 17.4 G/DL (ref 13.6–17.2)
HIGH TARGET, HITG: 250 MG/DL
HIGH TARGET, HITG: 300 MG/DL
IMM GRANULOCYTES # BLD AUTO: 0.1 K/UL (ref 0–0.5)
IMM GRANULOCYTES NFR BLD AUTO: 0 % (ref 0–5)
INSULIN ADMINSTERED, INSADM: 10.8 UNITS/HOUR
INSULIN ADMINSTERED, INSADM: 11.5 UNITS/HOUR
INSULIN ADMINSTERED, INSADM: 16.2 UNITS/HOUR
INSULIN ADMINSTERED, INSADM: 17.9 UNITS/HOUR
INSULIN ADMINSTERED, INSADM: 21.6 UNITS/HOUR
INSULIN ADMINSTERED, INSADM: 8 UNITS/HOUR
INSULIN ADMINSTERED, INSADM: 9.1 UNITS/HOUR
INSULIN ORDER, INSORD: 10.8 UNITS/HOUR
INSULIN ORDER, INSORD: 11.5 UNITS/HOUR
INSULIN ORDER, INSORD: 16.2 UNITS/HOUR
INSULIN ORDER, INSORD: 17.9 UNITS/HOUR
INSULIN ORDER, INSORD: 21.6 UNITS/HOUR
INSULIN ORDER, INSORD: 8 UNITS/HOUR
INSULIN ORDER, INSORD: 9.1 UNITS/HOUR
LACTATE SERPL-SCNC: 3.3 MMOL/L (ref 0.4–2)
LIPASE SERPL-CCNC: 75 U/L (ref 73–393)
LOW TARGET, LOT: 150 MG/DL
LOW TARGET, LOT: 200 MG/DL
LYMPHOCYTES # BLD: 1 K/UL (ref 0.5–4.6)
LYMPHOCYTES NFR BLD: 6 % (ref 13–44)
MAGNESIUM SERPL-MCNC: 2.7 MG/DL (ref 1.8–2.4)
MAGNESIUM SERPL-MCNC: 2.8 MG/DL (ref 1.8–2.4)
MAGNESIUM SERPL-MCNC: 2.9 MG/DL (ref 1.8–2.4)
MCH RBC QN AUTO: 25.7 PG (ref 26.1–32.9)
MCHC RBC AUTO-ENTMCNC: 30.9 G/DL (ref 31.4–35)
MCV RBC AUTO: 83.3 FL (ref 79.6–97.8)
MINUTES UNTIL NEXT BG, NBG: 60 MIN
MONOCYTES # BLD: 1 K/UL (ref 0.1–1.3)
MONOCYTES NFR BLD: 6 % (ref 4–12)
MULTIPLIER, MUL: 0.02
MULTIPLIER, MUL: 0.02
MULTIPLIER, MUL: 0.03
MULTIPLIER, MUL: 0.04
MULTIPLIER, MUL: 0.04
NEUTS SEG # BLD: 12.9 K/UL (ref 1.7–8.2)
NEUTS SEG NFR BLD: 86 % (ref 43–78)
NRBC # BLD: 0 K/UL (ref 0–0.2)
O2/TOTAL GAS SETTING VFR VENT: 21 %
ORDER INITIALS, ORDINIT: NORMAL
P-R INTERVAL, ECG05: 138 MS
PCO2 BLDV: 34.4 MMHG (ref 41–51)
PH BLDV: 7.37 [PH] (ref 7.32–7.42)
PHOSPHATE SERPL-MCNC: 3.7 MG/DL (ref 2.5–4.5)
PLATELET # BLD AUTO: 179 K/UL (ref 150–450)
PMV BLD AUTO: 10.6 FL (ref 9.4–12.3)
PO2 BLDV: 56 MMHG
POTASSIUM SERPL-SCNC: 3 MMOL/L (ref 3.5–5.1)
POTASSIUM SERPL-SCNC: 3 MMOL/L (ref 3.5–5.1)
POTASSIUM SERPL-SCNC: 3.4 MMOL/L (ref 3.5–5.1)
POTASSIUM SERPL-SCNC: 3.9 MMOL/L (ref 3.5–5.1)
PROCALCITONIN SERPL-MCNC: 0.16 NG/ML (ref 0–0.49)
PROT SERPL-MCNC: 9.4 G/DL (ref 6.3–8.2)
Q-T INTERVAL, ECG07: 388 MS
QRS DURATION, ECG06: 84 MS
QTC CALCULATION (BEZET), ECG08: 532 MS
RBC # BLD AUTO: 6.76 M/UL (ref 4.23–5.6)
SAO2 % BLDV: 88.2 % (ref 65–88)
SERVICE CMNT-IMP: ABNORMAL
SODIUM SERPL-SCNC: 139 MMOL/L (ref 138–145)
SODIUM SERPL-SCNC: 149 MMOL/L (ref 136–145)
SODIUM SERPL-SCNC: 154 MMOL/L (ref 138–145)
SODIUM SERPL-SCNC: 160 MMOL/L (ref 136–145)
SOURCE, COVRS: NORMAL
SPECIMEN TYPE: ABNORMAL
VENTRICULAR RATE, ECG03: 113 BPM
WBC # BLD AUTO: 15.1 K/UL (ref 4.3–11.1)

## 2022-02-16 PROCEDURE — 82077 ASSAY SPEC XCP UR&BREATH IA: CPT

## 2022-02-16 PROCEDURE — 96375 TX/PRO/DX INJ NEW DRUG ADDON: CPT

## 2022-02-16 PROCEDURE — 96361 HYDRATE IV INFUSION ADD-ON: CPT

## 2022-02-16 PROCEDURE — 87635 SARS-COV-2 COVID-19 AMP PRB: CPT

## 2022-02-16 PROCEDURE — 65610000006 HC RM INTENSIVE CARE

## 2022-02-16 PROCEDURE — 99285 EMERGENCY DEPT VISIT HI MDM: CPT

## 2022-02-16 PROCEDURE — 84145 PROCALCITONIN (PCT): CPT

## 2022-02-16 PROCEDURE — 96374 THER/PROPH/DIAG INJ IV PUSH: CPT

## 2022-02-16 PROCEDURE — 83605 ASSAY OF LACTIC ACID: CPT

## 2022-02-16 PROCEDURE — 74011250636 HC RX REV CODE- 250/636: Performed by: EMERGENCY MEDICINE

## 2022-02-16 PROCEDURE — 71045 X-RAY EXAM CHEST 1 VIEW: CPT

## 2022-02-16 PROCEDURE — 70450 CT HEAD/BRAIN W/O DYE: CPT

## 2022-02-16 PROCEDURE — 74011000258 HC RX REV CODE- 258: Performed by: INTERNAL MEDICINE

## 2022-02-16 PROCEDURE — 80053 COMPREHEN METABOLIC PANEL: CPT

## 2022-02-16 PROCEDURE — 74011250636 HC RX REV CODE- 250/636: Performed by: HOSPITALIST

## 2022-02-16 PROCEDURE — 74011000250 HC RX REV CODE- 250: Performed by: INTERNAL MEDICINE

## 2022-02-16 PROCEDURE — 87040 BLOOD CULTURE FOR BACTERIA: CPT

## 2022-02-16 PROCEDURE — 83036 HEMOGLOBIN GLYCOSYLATED A1C: CPT

## 2022-02-16 PROCEDURE — 81003 URINALYSIS AUTO W/O SCOPE: CPT

## 2022-02-16 PROCEDURE — 84100 ASSAY OF PHOSPHORUS: CPT

## 2022-02-16 PROCEDURE — 74011000250 HC RX REV CODE- 250: Performed by: HOSPITALIST

## 2022-02-16 PROCEDURE — 82962 GLUCOSE BLOOD TEST: CPT

## 2022-02-16 PROCEDURE — 74011000258 HC RX REV CODE- 258: Performed by: EMERGENCY MEDICINE

## 2022-02-16 PROCEDURE — 74011636637 HC RX REV CODE- 636/637: Performed by: EMERGENCY MEDICINE

## 2022-02-16 PROCEDURE — 80048 BASIC METABOLIC PNL TOTAL CA: CPT

## 2022-02-16 PROCEDURE — 83735 ASSAY OF MAGNESIUM: CPT

## 2022-02-16 PROCEDURE — 74011250636 HC RX REV CODE- 250/636: Performed by: INTERNAL MEDICINE

## 2022-02-16 PROCEDURE — 85025 COMPLETE CBC W/AUTO DIFF WBC: CPT

## 2022-02-16 PROCEDURE — 82803 BLOOD GASES ANY COMBINATION: CPT

## 2022-02-16 PROCEDURE — 93005 ELECTROCARDIOGRAM TRACING: CPT | Performed by: EMERGENCY MEDICINE

## 2022-02-16 PROCEDURE — 83690 ASSAY OF LIPASE: CPT

## 2022-02-16 PROCEDURE — 82140 ASSAY OF AMMONIA: CPT

## 2022-02-16 RX ORDER — SODIUM CHLORIDE 0.9 % (FLUSH) 0.9 %
5-40 SYRINGE (ML) INJECTION EVERY 8 HOURS
Status: DISCONTINUED | OUTPATIENT
Start: 2022-02-16 | End: 2022-02-23 | Stop reason: HOSPADM

## 2022-02-16 RX ORDER — OXCARBAZEPINE 300 MG/1
300 TABLET, FILM COATED ORAL EVERY 12 HOURS
Status: DISCONTINUED | OUTPATIENT
Start: 2022-02-16 | End: 2022-02-23 | Stop reason: HOSPADM

## 2022-02-16 RX ORDER — ACETAMINOPHEN 325 MG/1
650 TABLET ORAL
Status: DISCONTINUED | OUTPATIENT
Start: 2022-02-16 | End: 2022-02-23 | Stop reason: HOSPADM

## 2022-02-16 RX ORDER — SODIUM CHLORIDE 450 MG/100ML
100 INJECTION, SOLUTION INTRAVENOUS CONTINUOUS
Status: DISCONTINUED | OUTPATIENT
Start: 2022-02-16 | End: 2022-02-17

## 2022-02-16 RX ORDER — OLANZAPINE 5 MG/1
20 TABLET ORAL
Status: DISCONTINUED | OUTPATIENT
Start: 2022-02-16 | End: 2022-02-19

## 2022-02-16 RX ORDER — ONDANSETRON 2 MG/ML
4 INJECTION INTRAMUSCULAR; INTRAVENOUS
Status: DISCONTINUED | OUTPATIENT
Start: 2022-02-16 | End: 2022-02-23 | Stop reason: HOSPADM

## 2022-02-16 RX ORDER — ACETAMINOPHEN 650 MG/1
650 SUPPOSITORY RECTAL
Status: DISCONTINUED | OUTPATIENT
Start: 2022-02-16 | End: 2022-02-23 | Stop reason: HOSPADM

## 2022-02-16 RX ORDER — DEXTROSE MONOHYDRATE 100 MG/ML
125-250 INJECTION, SOLUTION INTRAVENOUS AS NEEDED
Status: DISCONTINUED | OUTPATIENT
Start: 2022-02-16 | End: 2022-02-17

## 2022-02-16 RX ORDER — POLYETHYLENE GLYCOL 3350 17 G/17G
17 POWDER, FOR SOLUTION ORAL DAILY PRN
Status: DISCONTINUED | OUTPATIENT
Start: 2022-02-16 | End: 2022-02-23 | Stop reason: HOSPADM

## 2022-02-16 RX ORDER — ALBUTEROL SULFATE 0.83 MG/ML
2.5 SOLUTION RESPIRATORY (INHALATION)
Status: DISCONTINUED | OUTPATIENT
Start: 2022-02-16 | End: 2022-02-23 | Stop reason: HOSPADM

## 2022-02-16 RX ORDER — LORAZEPAM 2 MG/ML
1 INJECTION INTRAMUSCULAR
Status: COMPLETED | OUTPATIENT
Start: 2022-02-16 | End: 2022-02-16

## 2022-02-16 RX ORDER — EMTRICITABINE AND TENOFOVIR DISOPROXIL FUMARATE 200; 300 MG/1; MG/1
1 TABLET, FILM COATED ORAL DAILY
Status: DISCONTINUED | OUTPATIENT
Start: 2022-02-17 | End: 2022-02-23 | Stop reason: HOSPADM

## 2022-02-16 RX ORDER — ONDANSETRON 4 MG/1
4 TABLET, ORALLY DISINTEGRATING ORAL
Status: DISCONTINUED | OUTPATIENT
Start: 2022-02-16 | End: 2022-02-23 | Stop reason: HOSPADM

## 2022-02-16 RX ORDER — VANCOMYCIN HYDROCHLORIDE
1250 EVERY 24 HOURS
Status: DISCONTINUED | OUTPATIENT
Start: 2022-02-17 | End: 2022-02-20

## 2022-02-16 RX ORDER — HALOPERIDOL 2 MG/ML
4 SOLUTION ORAL
Status: DISCONTINUED | OUTPATIENT
Start: 2022-02-16 | End: 2022-02-16

## 2022-02-16 RX ORDER — HALOPERIDOL 5 MG/ML
4 INJECTION INTRAMUSCULAR ONCE
Status: COMPLETED | OUTPATIENT
Start: 2022-02-17 | End: 2022-02-16

## 2022-02-16 RX ORDER — VANCOMYCIN 1.75 GRAM/500 ML IN 0.9 % SODIUM CHLORIDE INTRAVENOUS
1750 ONCE
Status: COMPLETED | OUTPATIENT
Start: 2022-02-16 | End: 2022-02-16

## 2022-02-16 RX ORDER — SODIUM CHLORIDE 9 MG/ML
75 INJECTION, SOLUTION INTRAVENOUS CONTINUOUS
Status: DISCONTINUED | OUTPATIENT
Start: 2022-02-16 | End: 2022-02-16

## 2022-02-16 RX ORDER — LORAZEPAM 2 MG/ML
1 INJECTION INTRAMUSCULAR
Status: DISCONTINUED | OUTPATIENT
Start: 2022-02-16 | End: 2022-02-17

## 2022-02-16 RX ORDER — HALOPERIDOL 5 MG/ML
INJECTION INTRAMUSCULAR
Status: ACTIVE
Start: 2022-02-16 | End: 2022-02-17

## 2022-02-16 RX ORDER — ATORVASTATIN CALCIUM 10 MG/1
10 TABLET, FILM COATED ORAL
Status: DISCONTINUED | OUTPATIENT
Start: 2022-02-16 | End: 2022-02-23 | Stop reason: HOSPADM

## 2022-02-16 RX ORDER — ENOXAPARIN SODIUM 100 MG/ML
40 INJECTION SUBCUTANEOUS EVERY 24 HOURS
Status: DISCONTINUED | OUTPATIENT
Start: 2022-02-16 | End: 2022-02-23 | Stop reason: HOSPADM

## 2022-02-16 RX ORDER — HALOPERIDOL 5 MG/ML
3 INJECTION INTRAMUSCULAR ONCE
Status: COMPLETED | OUTPATIENT
Start: 2022-02-16 | End: 2022-02-16

## 2022-02-16 RX ORDER — SODIUM CHLORIDE 0.9 % (FLUSH) 0.9 %
5-40 SYRINGE (ML) INJECTION AS NEEDED
Status: DISCONTINUED | OUTPATIENT
Start: 2022-02-16 | End: 2022-02-23 | Stop reason: HOSPADM

## 2022-02-16 RX ORDER — DEXTROSE 40 %
15 GEL (GRAM) ORAL AS NEEDED
Status: DISCONTINUED | OUTPATIENT
Start: 2022-02-16 | End: 2022-02-23 | Stop reason: HOSPADM

## 2022-02-16 RX ORDER — POTASSIUM CHLORIDE 14.9 MG/ML
20 INJECTION INTRAVENOUS ONCE
Status: COMPLETED | OUTPATIENT
Start: 2022-02-16 | End: 2022-02-17

## 2022-02-16 RX ADMIN — SODIUM CHLORIDE 100 ML/HR: 450 INJECTION, SOLUTION INTRAVENOUS at 21:36

## 2022-02-16 RX ADMIN — SODIUM CHLORIDE 3 G: 900 INJECTION INTRAVENOUS at 15:48

## 2022-02-16 RX ADMIN — VANCOMYCIN HYDROCHLORIDE 1750 MG: 10 INJECTION, POWDER, LYOPHILIZED, FOR SOLUTION INTRAVENOUS at 21:24

## 2022-02-16 RX ADMIN — SODIUM CHLORIDE 17.9 UNITS/HR: 9 INJECTION, SOLUTION INTRAVENOUS at 21:11

## 2022-02-16 RX ADMIN — SODIUM CHLORIDE 10.8 UNITS/HR: 9 INJECTION, SOLUTION INTRAVENOUS at 16:14

## 2022-02-16 RX ADMIN — LORAZEPAM 1 MG: 2 INJECTION INTRAMUSCULAR; INTRAVENOUS at 22:33

## 2022-02-16 RX ADMIN — HALOPERIDOL LACTATE 3 MG: 5 INJECTION, SOLUTION INTRAMUSCULAR at 15:55

## 2022-02-16 RX ADMIN — LORAZEPAM 1 MG: 2 INJECTION INTRAMUSCULAR; INTRAVENOUS at 15:21

## 2022-02-16 RX ADMIN — HALOPERIDOL LACTATE 3 MG: 5 INJECTION, SOLUTION INTRAMUSCULAR at 17:04

## 2022-02-16 RX ADMIN — PIPERACILLIN SODIUM,TAZOBACTAM SODIUM 3.38 G: 3; .375 INJECTION, POWDER, FOR SOLUTION INTRAVENOUS at 16:58

## 2022-02-16 RX ADMIN — POTASSIUM CHLORIDE 20 MEQ: 14.9 INJECTION, SOLUTION INTRAVENOUS at 22:00

## 2022-02-16 RX ADMIN — SODIUM CHLORIDE 1000 ML: 900 INJECTION, SOLUTION INTRAVENOUS at 16:58

## 2022-02-16 RX ADMIN — SODIUM CHLORIDE 75 ML/HR: 900 INJECTION, SOLUTION INTRAVENOUS at 18:36

## 2022-02-16 RX ADMIN — SODIUM CHLORIDE 1000 ML: 900 INJECTION, SOLUTION INTRAVENOUS at 13:11

## 2022-02-16 RX ADMIN — HALOPERIDOL LACTATE 4 MG: 5 INJECTION, SOLUTION INTRAMUSCULAR at 23:11

## 2022-02-16 RX ADMIN — SODIUM CHLORIDE, PRESERVATIVE FREE 10 ML: 5 INJECTION INTRAVENOUS at 17:05

## 2022-02-16 NOTE — ED NOTES
Brother at bedside and patient up at end of bed with IV in left AC pulled out and bleeding, both bed rails were still up. Direct pressure and bandaging placed, bleeding controlled. Patient was assisted back into bed and gown changed.

## 2022-02-16 NOTE — ED NOTES
Patient sitting in chair beside bed with urine noted on the floor upon provider entering room. Patient was assisted back into bed by provider. Urinal was at bedside and patient stated he did not see it. Altaf Garcia was off as well as monitoring cables, all replaced.

## 2022-02-16 NOTE — ED NOTES
Unable to locate patient initially for placement in room. Patient was found lying down on bed in rm 10 but easily awoken when spoken to. Patient was unsteady on his feet and oriented only to person. Patient was transferred to assigned room and changed into gown.

## 2022-02-16 NOTE — PROGRESS NOTES
VANCO DAILY FOLLOW UP NOTE  [facility] Pharmacokinetic Monitoring Service - Vancomycin    Consulting Provider: Devon   Indication: Sepsis  Target Concentration: Goal AUC/PILY 400-600 mg*hr/L  Day of Therapy: 1  Additional Antimicrobials: zosyn    Pertinent Laboratory Values: Wt Readings from Last 1 Encounters:   02/13/22 71.2 kg (157 lb)     Temp Readings from Last 1 Encounters:   02/16/22 97.3 °F (36.3 °C)     No components found for: PROCAL  Recent Labs     02/16/22  1657 02/16/22  1314 02/16/22  1145   BUN 33* 28*  --    CREA 1.70* 1.90*  --    WBC  --   --  15.1*   LAC  --  3.3*  --      Estimated Creatinine Clearance: 43.7 mL/min (A) (based on SCr of 1.7 mg/dL (H)). Lab Results   Component Value Date/Time    Vancomycin,trough 13.8 03/18/2020 03:59 AM       MRSA Nasal Swab: N/A. Non-respiratory infection. .      Assessment:  Date/Time Dose Concentration AUC         Note: Serum concentrations collected for AUC dosing may appear elevated if collected in close proximity to the dose administered, this is not necessarily an indication of toxicity    Plan:  Dosing recommendations based on Bayesian software  Start vancomycin with 1750 mg x 1 and then 1250 mg q24h  Anticipated AUC of 540 and trough concentration of 16.4 at steady state  Renal labs as indicated   Vancomycin concentration ordered as clinically indicated   Pharmacy will continue to monitor patient and adjust therapy as indicated    Thank you for the consult,  James Pool, PharmD, BCOP  Clinical Pharmacist  Contact via [employer]

## 2022-02-16 NOTE — DIABETES MGMT
Patient being admitted with a blood glucose of 1,410. GAP 17.Cr 1.90. GFR 48. Lactic acid 3.3. WBC 15.1. Awaiting other lab results. Patient has a past medical history of DM2, HIV, Hep B, HLD, smoking, schizophrenia, bipolar, insomnia, neuropathy, and substance abuse. Patient is well known to the diabetes management team as he was seen in March 2020 during a previous hospitalization for a left thumb laceration. At that time patient estimated he was diagnosed with diabetes around 2008 or 2009. Patient reported taking Basaglar 50 units daily, glipizide and metformin and was checking his blood glucose daily with his A1c being 8.7. Patient was not open to regimen titration when last seen preferring to keep his same regimen. Educator also had doubts about patient's ability to do a basal bolus regimen at home at that time. Note patient was seen at [facility] in October. A1c was 7.4, but med list was not very clear. Patient appears to have some confusion per progress notes. Note insulin drip and Glucostabilizer ordered. Will follow along and assess patient's educational needs as patient condition allows.

## 2022-02-16 NOTE — ED TRIAGE NOTES
Pt arrives via EMS from home c/o nausea/v/d since yesterday. According to EMS pt is more confused today. Normally a/ox4 but a/ox3. Home health nurse on arrival who states is not acting normal. Seen here recently. Denies abd discomfort or recent injuries. 96% RA. 110/76. bgl 351. .

## 2022-02-16 NOTE — ED PROVIDER NOTES
Patient presents the ER via EMS for nausea and vomiting as well as confusion. Patient reports he has been vomiting for a couple days. Reports some confusion as well. History is limited to patient's current condition. The history is provided by the patient and the EMS personnel. Vomiting   This is a new problem. The current episode started more than 2 days ago. Past Medical History:   Diagnosis Date    Autoimmune disease ([facility]     HIV    Diabetes ([facility]     Hypertension     Infectious disease     HIV    [facility]     Psychiatric disorder     bipolar, paranoid,     Seizures ([facility]        Past Surgical History:   Procedure Laterality Date    ABDOMEN SURGERY PROC UNLISTED      hernia repair         No family history on file. Social History     Socioeconomic History    Marital status: SINGLE     Spouse name: Not on file    Number of children: Not on file    Years of education: Not on file    Highest education level: Not on file   Occupational History    Not on file   Tobacco Use    Smoking status: Current Every Day Smoker     Packs/day: 1.00     Years: 22.00     Pack years: 22.00    Smokeless tobacco: Not on file   Substance and Sexual Activity    Alcohol use: No    Drug use: No    Sexual activity: Not on file   Other Topics Concern    Not on file   Social History Narrative    Not on file     Social Determinants of Health     Financial Resource Strain:     Difficulty of Paying Living Expenses: Not on file   Food Insecurity:     Worried About Running Out of Food in the Last Year: Not on file    Venus of Food in the Last Year: Not on file   Transportation Needs:     Lack of Transportation (Medical): Not on file    Lack of Transportation (Non-Medical):  Not on file   Physical Activity:     Days of Exercise per Week: Not on file    Minutes of Exercise per Session: Not on file   Stress:     Feeling of Stress : Not on file   Social Connections:     Frequency of Communication with Friends and Family: Not on file    Frequency of Social Gatherings with Friends and Family: Not on file    Attends Catholic Services: Not on file    Active Member of Clubs or Organizations: Not on file    Attends Club or Organization Meetings: Not on file    Marital Status: Not on file   Intimate Partner Violence:     Fear of Current or Ex-Partner: Not on file    Emotionally Abused: Not on file    Physically Abused: Not on file    Sexually Abused: Not on file   Housing Stability:     Unable to Pay for Housing in the Last Year: Not on file    Number of [place] in the Last Year: Not on file    Unstable Housing in the Last Year: Not on file         ALLERGIES: Thorazine [chlorpromazine]    Review of Systems   Unable to perform ROS: Acuity of condition   Gastrointestinal: Positive for vomiting. Vitals:    02/16/22 1138   BP: 102/80   Pulse: (!) 112   Resp: 15   Temp: 97.3 °F (36.3 °C)   SpO2: 97%            Physical Exam  Vitals reviewed. Constitutional:       Appearance: He is ill-appearing. HENT:      Head: Normocephalic and atraumatic. Right Ear: External ear normal.      Left Ear: External ear normal.      Nose: Nose normal. No congestion or rhinorrhea. Mouth/Throat:      Mouth: Mucous membranes are moist.      Pharynx: No oropharyngeal exudate or posterior oropharyngeal erythema. Eyes:      General:         Right eye: No discharge. Extraocular Movements: Extraocular movements intact. Pupils: Pupils are equal, round, and reactive to light. Cardiovascular:      Rate and Rhythm: Regular rhythm. Tachycardia present. Pulses: Normal pulses. Heart sounds: Normal heart sounds. Pulmonary:      Effort: Pulmonary effort is normal.      Breath sounds: Normal breath sounds. Abdominal:      General: There is no distension. Palpations: There is no mass. Tenderness: There is no abdominal tenderness.    Musculoskeletal:         General: No swelling, tenderness, deformity or signs of injury. Cervical back: Normal range of motion and neck supple. No rigidity or tenderness. Skin:     Coloration: Skin is not jaundiced or pale. Findings: No bruising. Neurological:      General: No focal deficit present. Mental Status: He is alert. Cranial Nerves: No cranial nerve deficit. Sensory: No sensory deficit. Psychiatric:         Mood and Affect: Mood normal.         Behavior: Behavior normal.          MDM  Number of Diagnoses or Management Options  Acute non-recurrent sinusitis, unspecified location  MARLEEN (acute kidney injury) ([facility]  Hyperglycemic hyperosmolar nonketotic coma ([facility]  Diagnosis management comments: Ill-appearing here. Tachycardic. Does appear confused but does follow commands. No gross focal neurological deficits. Differential diagnosis patient is broad    3:03 PM  CT scan of head is on significant for left maxillary sinusitis. However blood sugar is significantly elevated at 1400. Normal serum bicarb. Anion gap is 17. Will obtain a venous blood gas. Does have leukocytosis as well as a a lactic acid of 3.3. I feel this is more likely related to patient's severe dehydration as well as severe hyperglycemia, however given sinus findings on CT scan will start broad-spectrum antibiotics. I do not feel that his tachycardia and, leukocytosis and lactic acidosis is related to sepsis.         Amount and/or Complexity of Data Reviewed  Clinical lab tests: ordered and reviewed  Tests in the radiology section of CPT®: ordered and reviewed  Review and summarize past medical records: yes  Discuss the patient with other providers: yes  Independent visualization of images, tracings, or specimens: yes (EKG interpretation: Sinus tachycardia, rate 113, normal axis, no blocks, no acute ST segment changes noted, comparison EKG performed May 19, 2020)    Risk of Complications, Morbidity, and/or Mortality  Presenting problems: high  Diagnostic procedures: moderate  Management options: high  General comments: Critical Care Time 60 Minutes: Excluding all billable procedures, time spent at the bedside directing patients resuscitation, updating family, and coordinating care with consultants      Patient Progress  Patient progress: stable         Procedures          Results Include:    Recent Results (from the past 24 hour(s))   CBC WITH AUTOMATED DIFF    Collection Time: 02/16/22 11:45 AM   Result Value Ref Range    WBC 15.1 (H) 4.3 - 11.1 K/uL    RBC 6.76 (H) 4.23 - 5.6 M/uL    HGB 17.4 (H) 13.6 - 17.2 g/dL    HCT 56.3 (H) 41.1 - 50.3 %    MCV 83.3 79.6 - 97.8 FL    MCH 25.7 (L) 26.1 - 32.9 PG    MCHC 30.9 (L) 31.4 - 35.0 g/dL    RDW 18.2 (H) 11.9 - 14.6 %    PLATELET 905 570 - 278 K/uL    MPV 10.6 9.4 - 12.3 FL    ABSOLUTE NRBC 0.00 0.0 - 0.2 K/uL    DF AUTOMATED      NEUTROPHILS 86 (H) 43 - 78 %    LYMPHOCYTES 6 (L) 13 - 44 %    MONOCYTES 6 4.0 - 12.0 %    EOSINOPHILS 1 0.5 - 7.8 %    BASOPHILS 0 0.0 - 2.0 %    IMMATURE GRANULOCYTES 0 0.0 - 5.0 %    ABS. NEUTROPHILS 12.9 (H) 1.7 - 8.2 K/UL    ABS. LYMPHOCYTES 1.0 0.5 - 4.6 K/UL    ABS. MONOCYTES 1.0 0.1 - 1.3 K/UL    ABS. EOSINOPHILS 0.1 0.0 - 0.8 K/UL    ABS. BASOPHILS 0.1 0.0 - 0.2 K/UL    ABS. IMM.  GRANS. 0.1 0.0 - 0.5 K/UL   PROCALCITONIN    Collection Time: 02/16/22 11:45 AM   Result Value Ref Range    Procalcitonin 0.14 0.00 - 0.49 ng/mL   EKG, 12 LEAD, INITIAL    Collection Time: 02/16/22 11:45 AM   Result Value Ref Range    Ventricular Rate 113 BPM    Atrial Rate 113 BPM    P-R Interval 138 ms    QRS Duration 84 ms    Q-T Interval 388 ms    QTC Calculation (Bezet) 532 ms    Calculated P Axis 72 degrees    Calculated R Axis 53 degrees    Calculated T Axis 40 degrees    Diagnosis       Sinus tachycardia  Right atrial enlargement  Nonspecific ST abnormality  Prolonged QT  Abnormal ECG  When compared with ECG of 19-MAY-2020 13:06,  Non-specific change in ST segment in Inferior leads  ST now depressed in Anterolateral leads  Confirmed by [facility]  MD ()PARMJIT ([id number]) on 2/16/2022 1:15:05 PM     LIPASE    Collection Time: 02/16/22  1:14 PM   Result Value Ref Range    Lipase 75 73 - 391 U/L   METABOLIC PANEL, COMPREHENSIVE    Collection Time: 02/16/22  1:14 PM   Result Value Ref Range    Sodium 139 138 - 145 mmol/L    Potassium 3.9 3.5 - 5.1 mmol/L    Chloride 96 (L) 98 - 107 mmol/L    CO2 26 21 - 32 mmol/L    Anion gap 17 (H) 7 - 16 mmol/L    Glucose 1,410 (HH) 65 - 100 mg/dL    BUN 28 (H) 6 - 23 MG/DL    Creatinine 1.90 (H) 0.8 - 1.5 MG/DL    GFR est AA 48 (L) >60 ml/min/1.73m2    GFR est non-AA 40 (L) >60 ml/min/1.73m2    Calcium 11.5 (H) 8.3 - 10.4 MG/DL    Bilirubin, total 0.7 0.2 - 1.1 MG/DL    ALT (SGPT) 35 12 - 65 U/L    AST (SGOT) 14 (L) 15 - 37 U/L    Alk. phosphatase 117 50 - 136 U/L    Protein, total 9.4 (H) 6.3 - 8.2 g/dL    Albumin 4.7 3.5 - 5.0 g/dL    Globulin 4.7 (H) 2.3 - 3.5 g/dL    A-G Ratio 1.0 (L) 1.2 - 3.5     LACTIC ACID    Collection Time: 02/16/22  1:14 PM   Result Value Ref Range    Lactic acid 3.3 (H) 0.4 - 2.0 MMOL/L   COVID-19 RAPID TEST    Collection Time: 02/16/22  1:14 PM   Result Value Ref Range    Specimen source Nasopharyngeal      COVID-19 rapid test Not detected NOTD     AMMONIA    Collection Time: 02/16/22  1:14 PM   Result Value Ref Range    Ammonia 33 (H) 11 - 32 UMOL/L   CULTURE, BLOOD    Collection Time: 02/16/22  1:31 PM    Specimen: Blood   Result Value Ref Range    Special Requests: RIGHT  Antecubital        Culture result: PENDING      Voice dictation software was used during the making of this note. This software is not perfect and grammatical and other typographical errors may be present. This note has been proofread, but may still contain errors.   Kevon Marin MD; 2/16/2022 @3:03 PM   ===================================================================             Orders Placed This Encounter    CULTURE, BLOOD    CULTURE, BLOOD  COVID-19 RAPID TEST    CT HEAD WO CONT    CBC with Diff    LIPASE    METABOLIC PANEL, COMPREHENSIVE    URINALYSIS    DRUG SCREEN, URINE    LACTIC ACID    AMMONIA    METABOLIC PANEL, BASIC    GLUCOSE, RANDOM    MAGNESIUM    PHOSPHORUS    HEMOGLOBIN A1C    VENOUS BLOOD GAS    ETHYL ALCOHOL    PROCALCITONIN    DIET NPO    POC Urine Dipstick    GLUCOSTABILIZER    NOTIFY PROVIDER: VITAL SIGNS CHANGES    NOTIFY PROVIDER: SPECIFY *Serum pH less than 6.9 to obtain orders for intravenous bicarbonate *Serum potassium less than 3.5 mEq/L or greater than 5.2 mEq/L *Serum magnesium less than 1.5 mEq/L *Serum phosphorus less than 1.5 mEq/L *If insulin dri. Yg Che NOTIFY PROVIDER: SPECIFY Notify provider: FOR DKA: When BG is within target range for at least 4 hours AND Anion gap less than 12 mEq/L on two consecutive basic metabolic panels (BMP), for orders to transition off the drip to subcutaneous basal in. ..    NOTIFY PROVIDER: SPECIFY Notify provider: (Only For DKA and HHS Patients) If patient in DKA or HHS when BG is less than or equal to 250 mg/dL, for order to add 5% dextrose to existing fluids.  ONE TIME STAT    NOTIFY PROVIDER: SPECIFY NOTIFY PROVIDER: IF PATIENT IN HHS AND DEVELOPS SEVERE HEADACHE OR ALTERED MENTAL STATUS: NOTIFY PHYSICIAN STAT, RAISE HEAD OF BED 30 DEGREES, AND DECREASE IVF TO 20 ML PER HOUR ONE TIME STAT    POC GLUCOSE - Every 1 hour    POC GLUCOSE    EKG (Check If Upper Abdominal Pain or symptoms of SOB, Diaphoresis, or Tachycardia)    sodium chloride 0.9 % bolus infusion 1,000 mL    insulin regular (NOVOLIN R, HUMULIN R) 100 Units in 0.9% sodium chloride 100 mL infusion    dextrose 40% (GLUTOSE) oral gel 1 Tube    glucagon (GLUCAGEN) injection 1 mg    dextrose 10% infusion 125-250 mL    ampicillin-sulbactam (UNASYN) 3 g in 0.9% sodium chloride (MBP/ADV) 100 mL MBP    haloperidol lactate (HALDOL) injection 3 mg    LORazepam (ATIVAN) injection 1 mg    IP CONSULT TO DIABETES MANAGEMENT            ICD-10-CM ICD-9-CM    1. MARLEEN (acute kidney injury) ([facility]  N17.9 584.9    2. Hyperglycemic hyperosmolar nonketotic coma ([facility]  E11.01 250.20      250.30    3. Acute non-recurrent sinusitis, unspecified location  J01.90 461.9          Results for orders placed or performed during the hospital encounter of 02/16/22   1. COVID-19 RAPID TEST   Result Value Ref Range    Specimen source Nasopharyngeal      COVID-19 rapid test Not detected NOTD     2. CBC WITH AUTOMATED DIFF   Result Value Ref Range    WBC 15.1 (H) 4.3 - 11.1 K/uL    RBC 6.76 (H) 4.23 - 5.6 M/uL    HGB 17.4 (H) 13.6 - 17.2 g/dL    HCT 56.3 (H) 41.1 - 50.3 %    MCV 83.3 79.6 - 97.8 FL    MCH 25.7 (L) 26.1 - 32.9 PG    MCHC 30.9 (L) 31.4 - 35.0 g/dL    RDW 18.2 (H) 11.9 - 14.6 %    PLATELET 982 949 - 298 K/uL    MPV 10.6 9.4 - 12.3 FL    ABSOLUTE NRBC 0.00 0.0 - 0.2 K/uL    DF AUTOMATED      NEUTROPHILS 86 (H) 43 - 78 %    LYMPHOCYTES 6 (L) 13 - 44 %    MONOCYTES 6 4.0 - 12.0 %    EOSINOPHILS 1 0.5 - 7.8 %    BASOPHILS 0 0.0 - 2.0 %    IMMATURE GRANULOCYTES 0 0.0 - 5.0 %    ABS. NEUTROPHILS 12.9 (H) 1.7 - 8.2 K/UL    ABS. LYMPHOCYTES 1.0 0.5 - 4.6 K/UL    ABS. MONOCYTES 1.0 0.1 - 1.3 K/UL    ABS. EOSINOPHILS 0.1 0.0 - 0.8 K/UL    ABS. BASOPHILS 0.1 0.0 - 0.2 K/UL    ABS. IMM. GRANS. 0.1 0.0 - 0.5 K/UL   3. LIPASE   Result Value Ref Range    Lipase 75 73 - 393 U/L   4. METABOLIC PANEL, COMPREHENSIVE   Result Value Ref Range    Sodium 139 138 - 145 mmol/L    Potassium 3.9 3.5 - 5.1 mmol/L    Chloride 96 (L) 98 - 107 mmol/L    CO2 26 21 - 32 mmol/L    Anion gap 17 (H) 7 - 16 mmol/L    Glucose 1,410 (HH) 65 - 100 mg/dL    BUN 28 (H) 6 - 23 MG/DL    Creatinine 1.90 (H) 0.8 - 1.5 MG/DL    GFR est AA 48 (L) >60 ml/min/1.73m2    GFR est non-AA 40 (L) >60 ml/min/1.73m2    Calcium 11.5 (H) 8.3 - 10.4 MG/DL    Bilirubin, total 0.7 0.2 - 1.1 MG/DL    ALT (SGPT) 35 12 - 65 U/L    AST (SGOT) 14 (L) 15 - 37 U/L    Alk.  phosphatase 117 50 - 136 U/L    Protein, total 9.4 (H) 6.3 - 8.2 g/dL    Albumin 4.7 3.5 - 5.0 g/dL    Globulin 4.7 (H) 2.3 - 3.5 g/dL    A-G Ratio 1.0 (L) 1.2 - 3.5     5. LACTIC ACID   Result Value Ref Range    Lactic acid 3.3 (H) 0.4 - 2.0 MMOL/L   6.  AMMONIA   Result Value Ref Range    Ammonia 33 (H) 11 - 32 UMOL/L   7. EKG, 12 LEAD, INITIAL   Result Value Ref Range    Ventricular Rate 113 BPM    Atrial Rate 113 BPM    P-R Interval 138 ms    QRS Duration 84 ms    Q-T Interval 388 ms    QTC Calculation (Bezet) 532 ms    Calculated P Axis 72 degrees    Calculated R Axis 53 degrees    Calculated T Axis 40 degrees    Diagnosis       Sinus tachycardia  Right atrial enlargement  Nonspecific ST abnormality  Prolonged QT  Abnormal ECG  When compared with ECG of 19-MAY-2020 13:06,  Non-specific change in ST segment in Inferior leads  ST now depressed in Anterolateral leads  Confirmed by Margie Serrano MD (), PARMJIT DAVIES ([id number]) on 2/16/2022 1:15:05 PM

## 2022-02-17 ENCOUNTER — APPOINTMENT (OUTPATIENT)
Dept: GENERAL RADIOLOGY | Age: 52
DRG: 637 | End: 2022-02-17
Payer: MEDICARE

## 2022-02-17 LAB
ADMINISTERED INITIALS, ADMINIT: NORMAL
AMPHET UR QL SCN: NEGATIVE
ANION GAP SERPL CALC-SCNC: 1 MMOL/L (ref 7–16)
ANION GAP SERPL CALC-SCNC: 2 MMOL/L (ref 7–16)
ANION GAP SERPL CALC-SCNC: 7 MMOL/L (ref 7–16)
ANION GAP SERPL CALC-SCNC: 8 MMOL/L (ref 7–16)
APPEARANCE UR: CLEAR
BACTERIA URNS QL MICRO: 0 /HPF
BARBITURATES UR QL SCN: NEGATIVE
BASOPHILS # BLD: 0.1 K/UL (ref 0–0.2)
BASOPHILS NFR BLD: 0 % (ref 0–2)
BENZODIAZ UR QL: NEGATIVE
BILIRUB UR QL: NEGATIVE
BUN SERPL-MCNC: 30 MG/DL (ref 6–23)
BUN SERPL-MCNC: 30 MG/DL (ref 6–23)
BUN SERPL-MCNC: 31 MG/DL (ref 6–23)
BUN SERPL-MCNC: 31 MG/DL (ref 6–23)
CALCIUM SERPL-MCNC: 10.5 MG/DL (ref 8.3–10.4)
CALCIUM SERPL-MCNC: 9.3 MG/DL (ref 8.3–10.4)
CALCIUM SERPL-MCNC: 9.3 MG/DL (ref 8.3–10.4)
CALCIUM SERPL-MCNC: 9.4 MG/DL (ref 8.3–10.4)
CANNABINOIDS UR QL SCN: NEGATIVE
CASTS URNS QL MICRO: ABNORMAL /LPF
CHLORIDE SERPL-SCNC: 127 MMOL/L (ref 98–107)
CHLORIDE SERPL-SCNC: 127 MMOL/L (ref 98–107)
CHLORIDE SERPL-SCNC: 128 MMOL/L (ref 98–107)
CHLORIDE SERPL-SCNC: 128 MMOL/L (ref 98–107)
CO2 SERPL-SCNC: 27 MMOL/L (ref 21–32)
CO2 SERPL-SCNC: 28 MMOL/L (ref 21–32)
CO2 SERPL-SCNC: 31 MMOL/L (ref 21–32)
CO2 SERPL-SCNC: 32 MMOL/L (ref 21–32)
COCAINE UR QL SCN: NEGATIVE
COLOR UR: YELLOW
CREAT SERPL-MCNC: 1.4 MG/DL (ref 0.8–1.5)
CREAT SERPL-MCNC: 1.4 MG/DL (ref 0.8–1.5)
CREAT SERPL-MCNC: 1.6 MG/DL (ref 0.8–1.5)
CREAT SERPL-MCNC: 1.7 MG/DL (ref 0.8–1.5)
D50 ADMINISTERED, D50ADM: 0 ML
D50 ORDER, D50ORD: 0 ML
DIFFERENTIAL METHOD BLD: ABNORMAL
EOSINOPHIL # BLD: 0 K/UL (ref 0–0.8)
EOSINOPHIL NFR BLD: 0 % (ref 0.5–7.8)
ERYTHROCYTE [DISTWIDTH] IN BLOOD BY AUTOMATED COUNT: 17.3 % (ref 11.9–14.6)
GLSCOM COMMENTS: NORMAL
GLUCOSE BLD STRIP.AUTO-MCNC: 115 MG/DL (ref 65–100)
GLUCOSE BLD STRIP.AUTO-MCNC: 175 MG/DL (ref 65–100)
GLUCOSE BLD STRIP.AUTO-MCNC: 175 MG/DL (ref 65–100)
GLUCOSE BLD STRIP.AUTO-MCNC: 177 MG/DL (ref 65–100)
GLUCOSE BLD STRIP.AUTO-MCNC: 231 MG/DL (ref 65–100)
GLUCOSE BLD STRIP.AUTO-MCNC: 238 MG/DL (ref 65–100)
GLUCOSE BLD STRIP.AUTO-MCNC: 262 MG/DL (ref 65–100)
GLUCOSE BLD STRIP.AUTO-MCNC: 266 MG/DL (ref 65–100)
GLUCOSE BLD STRIP.AUTO-MCNC: 288 MG/DL (ref 65–100)
GLUCOSE BLD STRIP.AUTO-MCNC: 378 MG/DL (ref 65–100)
GLUCOSE BLD STRIP.AUTO-MCNC: 393 MG/DL (ref 65–100)
GLUCOSE BLD STRIP.AUTO-MCNC: 403 MG/DL (ref 65–100)
GLUCOSE BLD STRIP.AUTO-MCNC: >600 MG/DL (ref 65–100)
GLUCOSE BLD STRIP.AUTO-MCNC: >600 MG/DL (ref 65–100)
GLUCOSE SERPL-MCNC: 136 MG/DL (ref 65–100)
GLUCOSE SERPL-MCNC: 223 MG/DL (ref 65–100)
GLUCOSE SERPL-MCNC: 361 MG/DL (ref 65–100)
GLUCOSE SERPL-MCNC: 433 MG/DL (ref 65–100)
GLUCOSE UR STRIP.AUTO-MCNC: >1000 MG/DL
GLUCOSE, GLC: 115 MG/DL
GLUCOSE, GLC: 175 MG/DL
GLUCOSE, GLC: 175 MG/DL
GLUCOSE, GLC: 177 MG/DL
GLUCOSE, GLC: 238 MG/DL
GLUCOSE, GLC: 262 MG/DL
GLUCOSE, GLC: 266 MG/DL
GLUCOSE, GLC: 378 MG/DL
HCT VFR BLD AUTO: 50.8 % (ref 41.1–50.3)
HGB BLD-MCNC: 16.4 G/DL (ref 13.6–17.2)
HGB UR QL STRIP: ABNORMAL
HIGH TARGET, HITG: 250 MG/DL
IMM GRANULOCYTES # BLD AUTO: 0.1 K/UL (ref 0–0.5)
IMM GRANULOCYTES NFR BLD AUTO: 1 % (ref 0–5)
INSULIN ADMINSTERED, INSADM: 10.3 UNITS/HOUR
INSULIN ADMINSTERED, INSADM: 12.7 UNITS/HOUR
INSULIN ADMINSTERED, INSADM: 2.2 UNITS/HOUR
INSULIN ADMINSTERED, INSADM: 4.7 UNITS/HOUR
INSULIN ADMINSTERED, INSADM: 5.8 UNITS/HOUR
INSULIN ADMINSTERED, INSADM: 5.8 UNITS/HOUR
INSULIN ADMINSTERED, INSADM: 8.1 UNITS/HOUR
INSULIN ADMINSTERED, INSADM: 8.9 UNITS/HOUR
INSULIN ORDER, INSORD: 10.3 UNITS/HOUR
INSULIN ORDER, INSORD: 12.7 UNITS/HOUR
INSULIN ORDER, INSORD: 2.2 UNITS/HOUR
INSULIN ORDER, INSORD: 4.7 UNITS/HOUR
INSULIN ORDER, INSORD: 5.8 UNITS/HOUR
INSULIN ORDER, INSORD: 5.8 UNITS/HOUR
INSULIN ORDER, INSORD: 8.1 UNITS/HOUR
INSULIN ORDER, INSORD: 8.9 UNITS/HOUR
KETONES UR QL STRIP.AUTO: ABNORMAL MG/DL
LACTATE SERPL-SCNC: 0.8 MMOL/L (ref 0.4–2)
LACTATE SERPL-SCNC: 3.7 MMOL/L (ref 0.4–2)
LACTATE SERPL-SCNC: 5 MMOL/L (ref 0.4–2)
LEUKOCYTE ESTERASE UR QL STRIP.AUTO: NEGATIVE
LOW TARGET, LOT: 150 MG/DL
LYMPHOCYTES # BLD: 1.8 K/UL (ref 0.5–4.6)
LYMPHOCYTES NFR BLD: 13 % (ref 13–44)
MAGNESIUM SERPL-MCNC: 2.4 MG/DL (ref 1.8–2.4)
MAGNESIUM SERPL-MCNC: 2.4 MG/DL (ref 1.8–2.4)
MAGNESIUM SERPL-MCNC: 2.5 MG/DL (ref 1.8–2.4)
MAGNESIUM SERPL-MCNC: 2.7 MG/DL (ref 1.8–2.4)
MCH RBC QN AUTO: 25.6 PG (ref 26.1–32.9)
MCHC RBC AUTO-ENTMCNC: 32.3 G/DL (ref 31.4–35)
MCV RBC AUTO: 79.3 FL (ref 79.6–97.8)
METHADONE UR QL: NEGATIVE
MINUTES UNTIL NEXT BG, NBG: 60 MIN
MONOCYTES # BLD: 1.6 K/UL (ref 0.1–1.3)
MONOCYTES NFR BLD: 11 % (ref 4–12)
MULTIPLIER, MUL: 0.04
MULTIPLIER, MUL: 0.05
NEUTS SEG # BLD: 11.1 K/UL (ref 1.7–8.2)
NEUTS SEG NFR BLD: 76 % (ref 43–78)
NITRITE UR QL STRIP.AUTO: NEGATIVE
NRBC # BLD: 0 K/UL (ref 0–0.2)
OPIATES UR QL: NEGATIVE
ORDER INITIALS, ORDINIT: NORMAL
OTHER OBSERVATIONS,UCOM: ABNORMAL
PCP UR QL: NEGATIVE
PH UR STRIP: 5.5 [PH] (ref 5–9)
PLATELET # BLD AUTO: 193 K/UL (ref 150–450)
PMV BLD AUTO: 10.4 FL (ref 9.4–12.3)
POTASSIUM SERPL-SCNC: 3.1 MMOL/L (ref 3.5–5.1)
POTASSIUM SERPL-SCNC: 3.6 MMOL/L (ref 3.5–5.1)
POTASSIUM SERPL-SCNC: 3.9 MMOL/L (ref 3.5–5.1)
POTASSIUM SERPL-SCNC: 4 MMOL/L (ref 3.5–5.1)
PROT UR STRIP-MCNC: 30 MG/DL
RBC # BLD AUTO: 6.41 M/UL (ref 4.23–5.6)
SERVICE CMNT-IMP: ABNORMAL
SODIUM SERPL-SCNC: 160 MMOL/L (ref 136–145)
SODIUM SERPL-SCNC: 161 MMOL/L (ref 138–145)
SODIUM SERPL-SCNC: 161 MMOL/L (ref 138–145)
SODIUM SERPL-SCNC: 164 MMOL/L (ref 138–145)
SP GR UR REFRACTOMETRY: 1.04 (ref 1–1.02)
UROBILINOGEN UR QL STRIP.AUTO: 0.2 EU/DL (ref 0.2–1)
WBC # BLD AUTO: 14.6 K/UL (ref 4.3–11.1)

## 2022-02-17 PROCEDURE — 86361 T CELL ABSOLUTE COUNT: CPT

## 2022-02-17 PROCEDURE — 36415 COLL VENOUS BLD VENIPUNCTURE: CPT

## 2022-02-17 PROCEDURE — 83735 ASSAY OF MAGNESIUM: CPT

## 2022-02-17 PROCEDURE — 65610000006 HC RM INTENSIVE CARE

## 2022-02-17 PROCEDURE — 74011000258 HC RX REV CODE- 258: Performed by: FAMILY MEDICINE

## 2022-02-17 PROCEDURE — 74011000250 HC RX REV CODE- 250

## 2022-02-17 PROCEDURE — 74011000250 HC RX REV CODE- 250: Performed by: INTERNAL MEDICINE

## 2022-02-17 PROCEDURE — 71045 X-RAY EXAM CHEST 1 VIEW: CPT

## 2022-02-17 PROCEDURE — 80048 BASIC METABOLIC PNL TOTAL CA: CPT

## 2022-02-17 PROCEDURE — 77010033678 HC OXYGEN DAILY

## 2022-02-17 PROCEDURE — 74011250636 HC RX REV CODE- 250/636: Performed by: INTERNAL MEDICINE

## 2022-02-17 PROCEDURE — 81001 URINALYSIS AUTO W/SCOPE: CPT

## 2022-02-17 PROCEDURE — 74011250636 HC RX REV CODE- 250/636: Performed by: STUDENT IN AN ORGANIZED HEALTH CARE EDUCATION/TRAINING PROGRAM

## 2022-02-17 PROCEDURE — 83605 ASSAY OF LACTIC ACID: CPT

## 2022-02-17 PROCEDURE — 02HV33Z INSERTION OF INFUSION DEVICE INTO SUPERIOR VENA CAVA, PERCUTANEOUS APPROACH: ICD-10-PCS

## 2022-02-17 PROCEDURE — 36556 INSERT NON-TUNNEL CV CATH: CPT

## 2022-02-17 PROCEDURE — 74011000250 HC RX REV CODE- 250: Performed by: FAMILY MEDICINE

## 2022-02-17 PROCEDURE — 85025 COMPLETE CBC W/AUTO DIFF WBC: CPT

## 2022-02-17 PROCEDURE — 74011250636 HC RX REV CODE- 250/636

## 2022-02-17 PROCEDURE — 74011000258 HC RX REV CODE- 258: Performed by: INTERNAL MEDICINE

## 2022-02-17 PROCEDURE — 80307 DRUG TEST PRSMV CHEM ANLYZR: CPT

## 2022-02-17 PROCEDURE — 74011636637 HC RX REV CODE- 636/637: Performed by: STUDENT IN AN ORGANIZED HEALTH CARE EDUCATION/TRAINING PROGRAM

## 2022-02-17 PROCEDURE — 82962 GLUCOSE BLOOD TEST: CPT

## 2022-02-17 PROCEDURE — 74011250636 HC RX REV CODE- 250/636: Performed by: HOSPITALIST

## 2022-02-17 PROCEDURE — 74011250636 HC RX REV CODE- 250/636: Performed by: FAMILY MEDICINE

## 2022-02-17 PROCEDURE — 74011250637 HC RX REV CODE- 250/637: Performed by: INTERNAL MEDICINE

## 2022-02-17 RX ORDER — ETOMIDATE 2 MG/ML
INJECTION INTRAVENOUS
Status: ACTIVE
Start: 2022-02-17 | End: 2022-02-17

## 2022-02-17 RX ORDER — NEVIRAPINE 200 MG/1
400 TABLET ORAL DAILY
COMMUNITY

## 2022-02-17 RX ORDER — DEXMEDETOMIDINE HYDROCHLORIDE 4 UG/ML
.1-1.5 INJECTION, SOLUTION INTRAVENOUS
Status: DISCONTINUED | OUTPATIENT
Start: 2022-02-17 | End: 2022-02-21

## 2022-02-17 RX ORDER — LORAZEPAM 2 MG/ML
1 INJECTION INTRAMUSCULAR
Status: DISCONTINUED | OUTPATIENT
Start: 2022-02-17 | End: 2022-02-17

## 2022-02-17 RX ORDER — INSULIN LISPRO 100 [IU]/ML
INJECTION, SOLUTION INTRAVENOUS; SUBCUTANEOUS EVERY 4 HOURS
Status: DISCONTINUED | OUTPATIENT
Start: 2022-02-17 | End: 2022-02-17

## 2022-02-17 RX ORDER — FENTANYL CITRATE 50 UG/ML
INJECTION, SOLUTION INTRAMUSCULAR; INTRAVENOUS
Status: COMPLETED
Start: 2022-02-17 | End: 2022-02-17

## 2022-02-17 RX ORDER — FENTANYL CITRATE 50 UG/ML
50 INJECTION, SOLUTION INTRAMUSCULAR; INTRAVENOUS ONCE
Status: COMPLETED | OUTPATIENT
Start: 2022-02-17 | End: 2022-02-17

## 2022-02-17 RX ORDER — INSULIN LISPRO 100 [IU]/ML
INJECTION, SOLUTION INTRAVENOUS; SUBCUTANEOUS
Status: DISCONTINUED | OUTPATIENT
Start: 2022-02-17 | End: 2022-02-17

## 2022-02-17 RX ORDER — MIDAZOLAM HYDROCHLORIDE 1 MG/ML
3 INJECTION, SOLUTION INTRAMUSCULAR; INTRAVENOUS ONCE
Status: COMPLETED | OUTPATIENT
Start: 2022-02-17 | End: 2022-02-17

## 2022-02-17 RX ORDER — CHOLECALCIFEROL (VITAMIN D3) 125 MCG
125 CAPSULE ORAL DAILY
COMMUNITY

## 2022-02-17 RX ORDER — BISMUTH SUBSALICYLATE 262 MG
1 TABLET,CHEWABLE ORAL DAILY
COMMUNITY

## 2022-02-17 RX ORDER — HALOPERIDOL 5 MG/ML
2 INJECTION INTRAMUSCULAR ONCE
Status: COMPLETED | OUTPATIENT
Start: 2022-02-18 | End: 2022-02-17

## 2022-02-17 RX ORDER — DEXMEDETOMIDINE HYDROCHLORIDE 4 UG/ML
.1-1.5 INJECTION, SOLUTION INTRAVENOUS
Status: DISCONTINUED | OUTPATIENT
Start: 2022-02-17 | End: 2022-02-17

## 2022-02-17 RX ORDER — POTASSIUM CHLORIDE 14.9 MG/ML
20 INJECTION INTRAVENOUS
Status: COMPLETED | OUTPATIENT
Start: 2022-02-17 | End: 2022-02-17

## 2022-02-17 RX ORDER — DEXTROSE MONOHYDRATE AND SODIUM CHLORIDE 5; .45 G/100ML; G/100ML
100 INJECTION, SOLUTION INTRAVENOUS CONTINUOUS
Status: DISCONTINUED | OUTPATIENT
Start: 2022-02-17 | End: 2022-02-17

## 2022-02-17 RX ORDER — INSULIN LISPRO 100 [IU]/ML
INJECTION, SOLUTION INTRAVENOUS; SUBCUTANEOUS EVERY 4 HOURS
Status: DISCONTINUED | OUTPATIENT
Start: 2022-02-17 | End: 2022-02-21

## 2022-02-17 RX ORDER — DEXTROSE MONOHYDRATE 50 MG/ML
100 INJECTION, SOLUTION INTRAVENOUS CONTINUOUS
Status: DISCONTINUED | OUTPATIENT
Start: 2022-02-17 | End: 2022-02-21

## 2022-02-17 RX ORDER — NOREPINEPHRINE BITARTRATE/D5W 4MG/250ML
.5-16 PLASTIC BAG, INJECTION (ML) INTRAVENOUS
Status: DISCONTINUED | OUTPATIENT
Start: 2022-02-17 | End: 2022-02-21

## 2022-02-17 RX ORDER — INSULIN GLARGINE 100 [IU]/ML
20 INJECTION, SOLUTION SUBCUTANEOUS
Status: DISCONTINUED | OUTPATIENT
Start: 2022-02-17 | End: 2022-02-21

## 2022-02-17 RX ORDER — INSULIN GLARGINE 100 [IU]/ML
10 INJECTION, SOLUTION SUBCUTANEOUS
Status: DISCONTINUED | OUTPATIENT
Start: 2022-02-17 | End: 2022-02-17

## 2022-02-17 RX ORDER — DEXTROSE MONOHYDRATE AND SODIUM CHLORIDE 5; .45 G/100ML; G/100ML
500 INJECTION, SOLUTION INTRAVENOUS CONTINUOUS
Status: DISCONTINUED | OUTPATIENT
Start: 2022-02-17 | End: 2022-02-17

## 2022-02-17 RX ORDER — MIDAZOLAM HYDROCHLORIDE 1 MG/ML
INJECTION, SOLUTION INTRAMUSCULAR; INTRAVENOUS
Status: COMPLETED
Start: 2022-02-17 | End: 2022-02-17

## 2022-02-17 RX ORDER — LORAZEPAM 2 MG/ML
1 INJECTION INTRAMUSCULAR
Status: DISCONTINUED | OUTPATIENT
Start: 2022-02-17 | End: 2022-02-23 | Stop reason: HOSPADM

## 2022-02-17 RX ADMIN — OXCARBAZEPINE 300 MG: 300 TABLET, FILM COATED ORAL at 08:33

## 2022-02-17 RX ADMIN — VANCOMYCIN HYDROCHLORIDE 1250 MG: 10 INJECTION, POWDER, LYOPHILIZED, FOR SOLUTION INTRAVENOUS at 20:00

## 2022-02-17 RX ADMIN — DEXTROSE MONOHYDRATE 100 ML/HR: 5 INJECTION, SOLUTION INTRAVENOUS at 17:12

## 2022-02-17 RX ADMIN — PIPERACILLIN SODIUM,TAZOBACTAM SODIUM 3.38 G: 3; .375 INJECTION, POWDER, FOR SOLUTION INTRAVENOUS at 23:42

## 2022-02-17 RX ADMIN — PIPERACILLIN SODIUM,TAZOBACTAM SODIUM 3.38 G: 3; .375 INJECTION, POWDER, FOR SOLUTION INTRAVENOUS at 16:23

## 2022-02-17 RX ADMIN — FENTANYL CITRATE 50 MCG: 50 INJECTION, SOLUTION INTRAMUSCULAR; INTRAVENOUS at 05:36

## 2022-02-17 RX ADMIN — LORAZEPAM 1 MG: 2 INJECTION INTRAMUSCULAR; INTRAVENOUS at 03:01

## 2022-02-17 RX ADMIN — ENOXAPARIN SODIUM 40 MG: 100 INJECTION SUBCUTANEOUS at 16:24

## 2022-02-17 RX ADMIN — DEXMEDETOMIDINE HYDROCHLORIDE 1.1 MCG/KG/HR: 400 INJECTION INTRAVENOUS at 21:10

## 2022-02-17 RX ADMIN — HALOPERIDOL LACTATE 2 MG: 5 INJECTION, SOLUTION INTRAMUSCULAR at 23:43

## 2022-02-17 RX ADMIN — DEXMEDETOMIDINE HYDROCHLORIDE 1.1 MCG/KG/HR: 400 INJECTION INTRAVENOUS at 12:26

## 2022-02-17 RX ADMIN — POTASSIUM CHLORIDE 20 MEQ: 14.9 INJECTION, SOLUTION INTRAVENOUS at 05:29

## 2022-02-17 RX ADMIN — SODIUM CHLORIDE, PRESERVATIVE FREE 40 ML: 5 INJECTION INTRAVENOUS at 22:11

## 2022-02-17 RX ADMIN — MIDAZOLAM 3 MG: 1 INJECTION INTRAMUSCULAR; INTRAVENOUS at 05:36

## 2022-02-17 RX ADMIN — LORAZEPAM 1 MG: 2 INJECTION INTRAMUSCULAR; INTRAVENOUS at 22:38

## 2022-02-17 RX ADMIN — DEXTROSE MONOHYDRATE AND SODIUM CHLORIDE 100 ML/HR: 5; .45 INJECTION, SOLUTION INTRAVENOUS at 03:47

## 2022-02-17 RX ADMIN — Medication 15 UNITS: at 13:00

## 2022-02-17 RX ADMIN — PIPERACILLIN SODIUM,TAZOBACTAM SODIUM 3.38 G: 3; .375 INJECTION, POWDER, FOR SOLUTION INTRAVENOUS at 00:10

## 2022-02-17 RX ADMIN — MIDAZOLAM HYDROCHLORIDE 3 MG: 1 INJECTION, SOLUTION INTRAMUSCULAR; INTRAVENOUS at 05:36

## 2022-02-17 RX ADMIN — DEXTROSE MONOHYDRATE AND SODIUM CHLORIDE 500 ML: 5; .45 INJECTION, SOLUTION INTRAVENOUS at 03:52

## 2022-02-17 RX ADMIN — LORAZEPAM 1 MG: 2 INJECTION INTRAMUSCULAR; INTRAVENOUS at 08:33

## 2022-02-17 RX ADMIN — POTASSIUM CHLORIDE 20 MEQ: 14.9 INJECTION, SOLUTION INTRAVENOUS at 08:36

## 2022-02-17 RX ADMIN — INSULIN GLARGINE 20 UNITS: 100 INJECTION, SOLUTION SUBCUTANEOUS at 21:10

## 2022-02-17 RX ADMIN — Medication 15 UNITS: at 16:23

## 2022-02-17 RX ADMIN — Medication 4 UNITS: at 09:00

## 2022-02-17 RX ADMIN — NOREPINEPHRINE BITARTRATE 4 MCG/MIN: 1 INJECTION, SOLUTION, CONCENTRATE INTRAVENOUS at 06:21

## 2022-02-17 RX ADMIN — SODIUM CHLORIDE, PRESERVATIVE FREE 10 ML: 5 INJECTION INTRAVENOUS at 14:12

## 2022-02-17 RX ADMIN — PIPERACILLIN SODIUM,TAZOBACTAM SODIUM 3.38 G: 3; .375 INJECTION, POWDER, FOR SOLUTION INTRAVENOUS at 08:33

## 2022-02-17 RX ADMIN — LORAZEPAM 1 MG: 2 INJECTION INTRAMUSCULAR; INTRAVENOUS at 18:01

## 2022-02-17 RX ADMIN — DEXMEDETOMIDINE HYDROCHLORIDE 0.4 MCG/KG/HR: 400 INJECTION INTRAVENOUS at 01:14

## 2022-02-17 RX ADMIN — DEXMEDETOMIDINE HYDROCHLORIDE 1.1 MCG/KG/HR: 400 INJECTION INTRAVENOUS at 16:23

## 2022-02-17 RX ADMIN — EMTRICITABINE AND TENOFOVIR DISOPROXIL FUMARATE 1 TABLET: 200; 300 TABLET, FILM COATED ORAL at 09:00

## 2022-02-17 RX ADMIN — Medication 9 UNITS: at 21:11

## 2022-02-17 NOTE — PROGRESS NOTES
Pt admitted with DKA, sepsis into [facility] ICU. CM attempted to meet with pt but pt is sedated and in 4 point restraints. Primary nurse informs CM that pt is a schizophrenic and quit taking his medications. Currently, pt on 4L/min. Sedation and pressor. CM found emergency contact as sister, Ta Panda [phone] but had LVOM  CM reviewed clinical notes. Pt saw his PCP at [facility] 10 months ago. CM will attempt to follow up with pt's sister 2/18.

## 2022-02-17 NOTE — PROGRESS NOTES
Physician Progress Note      PATIENTKyree Slater  CSN #:                  [id number]  :                       1970  ADMIT DATE:       2022 12:25 PM  [address] [place] DATE:  RESPONDING  PROVIDER #:        Zoila Jimenez MD          QUERY TEXT:    Pt admitted with sepsis and DKA. If possible, please document in the progress notes and discharge summary if you are evaluating and/or treating any of the following: The medical record reflects the following:  Risk Factors: sepsis, DKA  Clinical Indicators: Cr 1.9>1.7>1.9>1.6>1.7>1. 4  Treatment: IVF, serial labs    Defined by Kidney Disease Improving Global Outcomes (KDIGO) clinical practice guideline for acute kidney injury:  -Increase in SCr by greater than or equal to 0.3 mg/dl within 48?hours; or  -Increase or decrease in SCr to greater than or equal to 1.5 times baseline, which is known or presumed to have occurred within the prior 7 days; or  -Urine volume < 0.5ml/kg/h for 6 hours  Options provided:  -- Acute kidney failure  -- Acute kidney failure with acute tubular necrosis  -- Acute kidney injury  -- Other - I will add my own diagnosis  -- Disagree - Not applicable / Not valid  -- Disagree - Clinically unable to determine / Unknown  -- Refer to Clinical Documentation Reviewer    PROVIDER RESPONSE TEXT:    This patient is in Acute kidney failure.     Query created by: Kelly Nguyen on 2022 11:42 AM      Electronically signed by:  Zoila Jimenez MD 2022 2:07 PM

## 2022-02-17 NOTE — ED NOTES
TRANSFER - OUT REPORT:    Verbal report given to Damaris Porter RN(name) on Grisel Keefton  being transferred to ICU (unit) for routine progression of care       Report consisted of patients Situation, Background, Assessment and   Recommendations(SBAR). Information from the following report(s) ED Summary was reviewed with the receiving nurse. Lines:   Peripheral IV 02/16/22 Left Antecubital (Active)   Site Assessment Clean, dry, & intact 02/16/22 1308   Phlebitis Assessment 0 02/16/22 1308   Infiltration Assessment 0 02/16/22 1308   Dressing Status Clean, dry, & intact 02/16/22 1308   Dressing Type Transparent 02/16/22 1308   Hub Color/Line Status Pink 02/16/22 1308       Peripheral IV 02/16/22 Right Forearm (Active)   Site Assessment Clean, dry, & intact 02/16/22 1308   Phlebitis Assessment 0 02/16/22 1308   Infiltration Assessment 0 02/16/22 1308   Dressing Status Clean, dry, & intact 02/16/22 1308   Dressing Type Transparent 02/16/22 1308   Hub Color/Line Status Pink 02/16/22 1308       Peripheral IV 02/16/22 Anterior; Left Forearm (Active)        Opportunity for questions and clarification was provided.       Patient transported with:   Registered Nurse and sitter

## 2022-02-17 NOTE — PROGRESS NOTES
Bedside and verbal shift change report received from  Cosme Jc (offgoing nurse). Report included the following information SBAR, ED Summary, Procedure Summary, Intake/Output, MAR, Recent Results, Med Rec Status and Cardiac Rhythm ST. Dual skin assessment completed at bedside: Scar on R-skin, Bruising on R-arm (list pertinent skin assessment findings)    Dual verification of gtts completed (name of gtts verified):  Insulin

## 2022-02-17 NOTE — PROCEDURES
Procedure:   US GUIDED CENTRAL LINE PLACEMENT    Indication:   Hypotension  Summary:  Informed consent was obtained. A time-out was performed. Using the Sonosite ultrasound with the 5-10 MHz vascular probe transducer and sterile technique, the right subclavian vein landmarks were identified. The CVC kit guide wire was then inserted and its position within the right subclavian. Using Seldinger technique, a 8.5 Western Rosenda quad lumen catheter was then placed in the right subclavian vein, after dilation of entry port without difficulty. There was no significant bleeding during the procedure and good flush and drawback was noted. The CVC was then affixed with supplied 2.0 silk sutures via the proximal and distal limiters, with the insertion point affixed at 16 cm. A biostatic disc was applied to the entry port followed by a transparent dressing. A chest x-ray is ordered to confirm proper placement. There were no immediate complications.     Yancey Gilford, MD

## 2022-02-17 NOTE — PROGRESS NOTES
VANCO DAILY FOLLOW UP NOTE  [facility] Pharmacokinetic Monitoring Service - Vancomycin    Consulting Provider: Devon   Indication: Sepsis  Target Concentration: Goal AUC/PILY 400-600 mg*hr/L  Day of Therapy: 2  Additional Antimicrobials: zosyn    Pertinent Laboratory Values: Wt Readings from Last 1 Encounters:   02/13/22 71.2 kg (157 lb)     Temp Readings from Last 1 Encounters:   02/17/22 98.8 °F (37.1 °C)     No components found for: PROCAL  Recent Labs     02/17/22  0731 02/17/22  0315 02/16/22  2252 02/16/22  1657 02/16/22  1654 02/16/22  1314 02/16/22  1145   BUN 31* 30* 30*   < >  --  28*  --    CREA 1.40 1.70* 1.60*   < >  --  1.90*  --    WBC  --  14.6*  --   --   --   --  15.1*   PCT  --   --   --   --  0.16  --   --    LAC  --  5.0* 3.7*  --   --  3.3*  --     < > = values in this interval not displayed. Estimated Creatinine Clearance: 53.1 mL/min (based on SCr of 1.4 mg/dL). Lab Results   Component Value Date/Time    Vancomycin,trough 13.8 03/18/2020 03:59 AM       MRSA Nasal Swab: N/A. Non-respiratory infection. .      Assessment:  Date/Time Dose Concentration AUC         Note: Serum concentrations collected for AUC dosing may appear elevated if collected in close proximity to the dose administered, this is not necessarily an indication of toxicity    Plan:  1. Dosing recommendations based on Bayesian software  2. Continue current dose of 1250 mg q24h  a. Anticipated AUC of 481 and trough concentration of 13.9 at steady state  3. Renal labs as indicated   4. Vancomycin concentration ordered for 2/18 @ 0400  5. Pharmacy will continue to monitor patient and adjust therapy as indicated    Thank you for the consult,  Araseli Spears.  Ugo, PharmD, BCCCP  Contact via Perfect Serve

## 2022-02-17 NOTE — H&P
Admit date: 2022   Name:  Valarie Lombard   Age:  46 y.o.   :  1970   MRN:  [id number]   PCP:  Ashwin Camara MD   Provider:  Zoila Quintana MD      ASSESSMENT AND PLAN  52-year-old male with a past medical history of hypertension, HIV, schizophrenia, insulin-dependent diabetes mellitus, presented in the setting of altered mental status    1. DKA  -Continue insulin drip with glucose stabilizer  -Start IV fluids  -Monitor blood sugars every 1 hour  -Monitor BMP every 4 hours  -Continue insulin drip for another 2 hours after anion gap is closed  -Start D5 half saline after blood sugars range 200   -Monitor electrolytes closely and replete accordingly    2. Concerning of sepsis (tachycardia, leukocytosis) of unclear etiology  -Start IV fluids  -Start Zosyn and vancomycin for now  -Obtain procalcitonin  -Trend lactic acid  -Obtain urinalysis  -Chest x-ray without pneumonia    3. Acute metabolic encephalopathy likely in the setting of DKA  -CT of the brain without intracranial abnormalities  -Monitor mental status  -Avoid sedatives  -Delirium precautions    4. HLD  -Continue home medication  -Obtain CD4 count    5. Hypertension  -Hold antihypertensives for now    6. Schizophrenia  -Continue home medications    DVT prophylaxis with Lovenox    Full code    Brother aware of plan        CHIEF COMPLAINT:  Altered mental status      HISTORY OF PRESENT ILLNESS:  52-year-old male with a past medical history of hypertension, HIV, schizophrenia, insulin-dependent diabetes mellitus, presented in the setting of altered mental status. Most of the history obtained by brother at bedside since patient unable to provide any details. According to the brother and caregiver he has been presenting with worsening mentation for the last few days, the got worse over time so they decided to bring him to the emergency department.   Otherwise they have not seen any coughing or complaining of chest pain also no evidence of abdominal pain nausea vomiting or diarrhea. In the emergency department the patient was found to be tachycardic, with leukocytosis and a blood sugar of 1410. He was a started on insulin drip. He was given IV fluids and IV antibiotics. He will be admitted to the ICU for further management. Past Medical History:   Diagnosis Date    [facility] ([facility]     HIV    Diabetes ([facility]     Hypertension     Infectious disease     HIV    Paranoid schizophrenia ([facility]     Psychiatric disorder     bipolar, paranoid,     Seizures ([facility]        Past Surgical History:   Procedure Laterality Date    ABDOMEN SURGERY PROC UNLISTED      hernia repair          HOME MEDICATION:  Prior to Admission medications    Medication Sig Start Date End Date Taking? Authorizing Provider   albuterol (Ventolin HFA) 90 mcg/actuation inhaler Take 2 Puffs by inhalation every four (4) hours as needed for Wheezing. 9/17/20   Griselda Israel MD   OXcarbazepine (TRILEPTAL) 300 mg tablet Take 300 mg by mouth every twelve (12) hours. Provider, Historical   atorvastatin (LIPITOR) 10 mg tablet Take 10 mg by mouth nightly. Provider, Historical   benztropine (COGENTIN) 2 mg tablet Take 2 mg by mouth two (2) times a day. Provider, Historical   OLANZapine (ZyPREXA) 20 mg tablet Take 20 mg by mouth nightly. Provider, Historical   insulin detemir (LEVEMIR) 100 unit/mL injection 30 Units by SubCUTAneous route daily. Dixie Logan MD   Cetirizine (ZYRTEC) 10 mg cap Take  by mouth daily as needed. Provider, Historical   emtricitabine-tenofovir (TRUVADA) 200-300 mg per tablet Take 1 Tab by mouth daily. Dixie Logan MD   TRAZODONE HCL (TRAZODONE PO) Take 300 mg by mouth. Unknown dose     Dixie Logan MD   haloperidol (HALDOL) 5 mg tablet Take 5 mg by mouth nightly. 4/9/11   Dixie Logan MD   METFORMIN HCL (GLUCOPHAGE PO) Take 1,000 mg by mouth two (2) times a day.     Dixie Logan MD         REVIEW OF SYSTEMS:  14 ROS negative except from stated on HPI      Social History     Tobacco Use    Smoking status: Current Every Day Smoker     Packs/day: 1.00     Years: 22.00     Pack years: 22.00    Smokeless tobacco: Not on file   Substance Use Topics    Alcohol use: No    Drug use: No       Family history  Unable to obtain due to patient's condition    Allergies   Allergen Reactions    Thorazine [Chlorpromazine] Rash         Vitals:    02/16/22 1828 02/16/22 1842 02/16/22 1843 02/16/22 1858   BP: 110/80  (!) 135/98 105/80   Pulse: (!) 117 (!) 117 (!) 117 (!) 117   Resp: 20 22 20 21   Temp:       SpO2: 93% 95% 94% 93%         PHYSICAL EXAM:  General: Somnolent NAD  HEENT: NC/AT, EOM are intact  Neck: supple, no JVD  Cardiovascular: RRR, S1, S2, no murmurs  Respiratory: Lungs are clear, no wheezes or rales  Abdomen: Soft, NT, ND  Back: No CVA tenderness, no paraspinal tenderness  Extremities: LE without pedal edema, no erythema  Neuro: Somnolent, moving all extremities  Skin: no rash or ulcers        I have personally reviewed patients laboratory data showing  Lab Results   Component Value Date    WBC 15.1 (H) 02/16/2022    HGB 17.4 (H) 02/16/2022    HCT 56.3 (H) 02/16/2022    MCV 83.3 02/16/2022     02/16/2022     No results found for: CKMB  Lab Results   Component Value Date    GLU 1,047 (HH) 02/16/2022     (H) 02/16/2022    K 3.0 (L) 02/16/2022    CO2 21 02/16/2022     (H) 02/16/2022    BUN 33 (H) 02/16/2022         I have personally reviewed patients EKG showing  Sinus tachycardia, no acute ischemic changes      I have personally reviewed patients imaging showing  XR CHEST SNGL V   Final Result   No acute disease. CT HEAD WO CONT   Final Result   Complete opacification of the left maxillary sinus. No acute   intracranial abnormality.                   Likely length of stay more than 2 midnights

## 2022-02-17 NOTE — PROGRESS NOTES
Hospitalist Progress Note   Admit Date:  2022 12:25 PM   Name:  Farhat Moe   Age:  46 y.o. Sex:  male  :  1970   MRN:  [id number]   Room:  [facility]/    Presenting Complaint: Vomiting    Reason(s) for Admission: DKA (diabetic ketoacidosis) ([facility] [E11.10]  Sepsis [facility]) [A41.9]     Hospital Course & Interval History:   49-year-old male with a past medical history of hypertension, HIV, schizophrenia, insulin-dependent diabetes mellitus, presented in the setting of altered mental status. Most of the history obtained by brother at bedside since patient unable to provide any details. According to the brother and caregiver he has been presenting with worsening mentation for the last few days, the got worse over time so they decided to bring him to the emergency department. Otherwise they have not seen any coughing or complaining of chest pain also no evidence of abdominal pain nausea vomiting or diarrhea. In the emergency department the patient was found to be tachycardic, with leukocytosis and a blood sugar of 1410. He was a started on insulin drip. He was given IV fluids and IV antibiotics. Subjective/24hr Events (22): Patient was seen and examined at the bedside. Patient sedated in four-point restraints. Patient has history of schizophrenia and quit taking his medications. ROS:  10 systems reviewed and negative except as noted above.      Assessment & Plan:   DKA (diabetic ketoacidosis) ([facility] (2022)  Initially started on insulin drip with glucose stabilizer; currently off insulin drip due to resolution of anion gap  Currently on D5 half-normal due to hypernatremia  Continue to monitor blood glucose  Continue to monitor electrolytes closely and replete accordingly  A1c greater than 16  Diabetic educator pending  Currently on Lantus 10 units, sliding scale insulin    Concern for sepsis(tachycardia, leukocytosis) of unclear etiology  Continue IV fluids  Continue Vanco and Zosyn  Pro-Richi 0.16  UA negative  Lactic acid 1 8  Chest x-ray without pneumonia    Acute metabolic encephalopathy likely in the setting of DKA  CT of the brain without any acute intracranial abnormalities  Patient has a history of schizophrenia not taking his medications  Continue to monitor mental status  Avoid sedatives  Delirium precautions    Hyperlipidemia  Continue home medication    Hypertension  Hypertensives on hold at this point  Central line placed due to hypotension    Schizophrenia  We will continue patient's home medications  Apparently patient was noncompliant with his home medications, currently in four-point restraints with sedation      Dispo/Discharge Planning:    Dispo pending clinical course    Diet:  DIET NPO  DVT PPx: Lovenox  Code status: Full Code    Hospital Problems as of 2/17/2022 Never Reviewed          Codes Class Noted - Resolved POA    * (Principal) DKA (diabetic ketoacidosis) ([facility] ICD-10-CM: E11.10  ICD-9-CM: 250.12  2/16/2022 - Present Unknown        Sepsis ([facility] ICD-10-CM: A41.9  ICD-9-CM: 038.9, 995.91  2/16/2022 - Present Unknown        Schizophrenia ([facility] ICD-10-CM: F20.9  ICD-9-CM: 295.90  3/15/2020 - Present Yes        HIV (human immunodeficiency virus infection) ([facility] ICD-10-CM: B20  ICD-9-CM: V08  6/4/2014 - Present Yes              Objective:     Patient Vitals for the past 24 hrs:   Temp Pulse Resp BP SpO2   02/17/22 1619     100 %   02/17/22 1516  (!) 59  105/63 100 %   02/17/22 1501 98.2 °F (36.8 °C) (!) 59  110/64 100 %   02/17/22 1446  60  109/65 100 %   02/17/22 1431  60  112/65 100 %   02/17/22 1416  61  109/64 100 %   02/17/22 1401  62  106/64 100 %   02/17/22 1346  62  105/64 100 %   02/17/22 1331  62  104/63 100 %   02/17/22 1316  62  105/61 99 %   02/17/22 1301  62  (!) 106/57 99 %   02/17/22 1246  67  (!) 113/57 98 %   02/17/22 1231  63  122/69 100 %   02/17/22 1216  65  126/72 100 %   02/17/22 1201  66  117/69 100 %   02/17/22 1146  67  120/70 100 %   02/17/22 1131  67  125/73 100 %   02/17/22 1116  68  118/71 100 %   02/17/22 1101 99.2 °F (37.3 °C) 67  109/66 100 %   02/17/22 1046  67  112/67 100 %   02/17/22 1031  67  105/65 100 %   02/17/22 1016  67  110/67 100 %   02/17/22 1001  68  108/67 100 %   02/17/22 1000 98.6 °F (37 °C) 68 22 106/65    02/17/22 0946  68  106/65 100 %   02/17/22 0931  68  103/64 100 %   02/17/22 0916  68  103/62 100 %   02/17/22 0901  67  (!) 98/58 100 %   02/17/22 0846  68  108/61 99 %   02/17/22 0831  71  123/74 99 %   02/17/22 0816  71  114/70 100 %   02/17/22 0801  74  115/66 97 %   02/17/22 0746  69  (!) 95/54 100 %   02/17/22 0731  68  (!) 87/50 100 %   02/17/22 0716  67  (!) 93/55 100 %   02/17/22 0701 98.6 °F (37 °C) 67  (!) 97/55 100 %   02/17/22 0653  67  (!) 96/55 100 %   02/17/22 0648  68  (!) 98/59 100 %   02/17/22 0643  68  (!) 101/59 100 %   02/17/22 0637  68  (!) 97/59 100 %   02/17/22 0635 98.8 °F (37.1 °C) 68   100 %   02/17/22 0632  68  (!) 96/56 100 %   02/17/22 0627  68  (!) 80/51 99 %   02/17/22 0622  69  (!) 81/53 98 %   02/17/22 0617  69  (!) 81/50 99 %   02/17/22 0613  69  (!) 82/50 99 %   02/17/22 0607  69  (!) 89/50 99 %   02/17/22 0602  70  (!) 90/53 99 %   02/17/22 0557  71  (!) 90/53 99 %   02/17/22 0553  72  (!) 89/53 98 %   02/17/22 0548  72  (!) 84/52 98 %   02/17/22 0543  72  (!) 84/50 98 %   02/17/22 0537  72  (!) 85/50 98 %   02/17/22 0532  72  (!) 86/51 100 %   02/17/22 0527  72  (!) 86/50 100 %   02/17/22 0522  73  (!) 88/54 100 %   02/17/22 0517  75  (!) 98/58 98 %   02/17/22 0509  (!) 102  (!) 192/85 98 %   02/17/22 0502  75  (!) 84/57 96 %   02/17/22 0457  75  (!) 82/57 96 %   02/17/22 0453  75  (!) 83/56 96 %   02/17/22 0448  76  (!) 84/56 96 %   02/17/22 0443  76  (!) 82/54 96 %   02/17/22 0437  77  (!) 81/55 96 %   02/17/22 0432  78  (!) 82/54 96 % 02/17/22 0430 (!) 100.7 °F (38.2 °C) 78   96 %   02/17/22 0427  78  (!) 83/57 96 %   02/17/22 0422  78  (!) 85/57 96 %   02/17/22 0417  79  (!) 84/56 95 %   02/17/22 0412  80  (!) 83/56 95 %   02/17/22 0407  80  (!) 79/53 95 %   02/17/22 0402  80  (!) 80/52 95 %   02/17/22 0357  80  (!) 77/54 94 %   02/17/22 0348  81  (!) 72/51 95 %   02/17/22 0336  86  (!) 81/56 95 %   02/17/22 0332  89  (!) 79/55 94 %   02/17/22 0320 99.4 °F (37.4 °C) 96 22 (!) 86/61 93 %   02/17/22 0317  96  (!) 86/61 95 %   02/17/22 0308  98  98/72 96 %   02/17/22 0250  94  (!) 90/58 97 %   02/17/22 0132  (!) 126  116/67 94 %   02/17/22 0117  (!) 125  101/66 95 %   02/17/22 0102  (!) 145  130/88 97 %   02/17/22 0048  (!) 134  123/76 98 %   02/17/22 0032  (!) 109  94/77 94 %   02/17/22 0017  (!) 109  98/74 94 %   02/17/22 0007 98.7 °F (37.1 °C) (!) 110 22 97/67 95 %   02/17/22 0002  (!) 111  97/67 93 %   02/16/22 2348  (!) 112  93/67 93 %   02/16/22 2332  (!) 114  104/70 93 %   02/16/22 2318  (!) 131  100/70 96 %   02/16/22 2253  (!) 124  93/66 97 %   02/16/22 2200  (!) 116  113/76 96 %   02/16/22 2132  (!) 118  (!) 88/68 96 %   02/16/22 2118 98.3 °F (36.8 °C) (!) 116  98/74 96 %   02/16/22 2117  (!) 116  98/74 96 %   02/16/22 2015  (!) 133 26 (!) 108/91 96 %   02/16/22 1858  (!) 117 21 105/80 93 %   02/16/22 1843  (!) 117 20 (!) 135/98 94 %   02/16/22 1842  (!) 117 22  95 %   02/16/22 1828  (!) 117 20 110/80 93 %   02/16/22 1813  (!) 114 18 107/84 93 %   02/16/22 1756  (!) 128 25 (!) 118/90 96 %   02/16/22 1741  (!) 111 19 101/75 94 %   02/16/22 1726  (!) 110 19 (!) 148/108 93 %   02/16/22 1711  (!) 114 19 109/82 93 %   02/16/22 1656  (!) 113 20 (!) 119/90 94 %     Oxygen Therapy  O2 Sat (%): 100 % (02/17/22 1619)  Pulse via Oximetry: 126 beats per minute (02/17/22 1619)  O2 Device: BIPAP (02/17/22 1619)  Skin Assessment: Clean, dry, & intact (02/17/22 0701)  O2 Flow Rate (L/min): 4 l/min (02/17/22 0701)    Estimated body mass index is 26.13 kg/m² as calculated from the following:    Height as of 2/13/22: 5' 5\" (1.651 m). Weight as of 2/13/22: 71.2 kg (157 lb). Intake/Output Summary (Last 24 hours) at 2/17/2022 1646  Last data filed at 2/17/2022 1501  Gross per 24 hour   Intake 3501.87 ml   Output 300 ml   Net 3201.87 ml         Physical Exam:   Blood pressure 105/63, pulse (!) 59, temperature 98.2 °F (36.8 °C), resp. rate 22, SpO2 100 %. General:    Sedated  Head:  Normocephalic, atraumatic  Eyes:  Sclerae appear normal.  Pupils equally round. ENT:  Nares appear normal, no drainage. Moist oral mucosa  Neck:  No restricted ROM. Trachea midline   CV:   RRR. No m/r/g. No jugular venous distension. Lungs:   CTAB. No wheezing, rhonchi, or rales. Respirations even, unlabored  Abdomen: Bowel sounds present. Soft, nontender, nondistended. Extremities: No cyanosis or clubbing. No edema  Skin:     No rashes and normal coloration. Warm and dry. Neuro:  CN II-XII grossly intact. Sensation intact. A&Ox3  Psych:  Able to evaluate due to sedation    I have reviewed ordered lab tests and independently visualized imaging below:    Recent Labs:  Recent Results (from the past 48 hour(s))   CBC WITH AUTOMATED DIFF    Collection Time: 02/16/22 11:45 AM   Result Value Ref Range    WBC 15.1 (H) 4.3 - 11.1 K/uL    RBC 6.76 (H) 4.23 - 5.6 M/uL    HGB 17.4 (H) 13.6 - 17.2 g/dL    HCT 56.3 (H) 41.1 - 50.3 %    MCV 83.3 79.6 - 97.8 FL    MCH 25.7 (L) 26.1 - 32.9 PG    MCHC 30.9 (L) 31.4 - 35.0 g/dL    RDW 18.2 (H) 11.9 - 14.6 %    PLATELET 534 996 - 144 K/uL    MPV 10.6 9.4 - 12.3 FL    ABSOLUTE NRBC 0.00 0.0 - 0.2 K/uL    DF AUTOMATED      NEUTROPHILS 86 (H) 43 - 78 %    LYMPHOCYTES 6 (L) 13 - 44 %    MONOCYTES 6 4.0 - 12.0 %    EOSINOPHILS 1 0.5 - 7.8 %    BASOPHILS 0 0.0 - 2.0 %    IMMATURE GRANULOCYTES 0 0.0 - 5.0 %    ABS. NEUTROPHILS 12.9 (H) 1.7 - 8.2 K/UL    ABS.  LYMPHOCYTES 1.0 0.5 - 4.6 K/UL    ABS. MONOCYTES 1.0 0.1 - 1.3 K/UL    ABS. EOSINOPHILS 0.1 0.0 - 0.8 K/UL    ABS. BASOPHILS 0.1 0.0 - 0.2 K/UL    ABS. IMM. GRANS. 0.1 0.0 - 0.5 K/UL   EKG, 12 LEAD, INITIAL    Collection Time: 02/16/22 11:45 AM   Result Value Ref Range    Ventricular Rate 113 BPM    Atrial Rate 113 BPM    P-R Interval 138 ms    QRS Duration 84 ms    Q-T Interval 388 ms    QTC Calculation (Bezet) 532 ms    Calculated P Axis 72 degrees    Calculated R Axis 53 degrees    Calculated T Axis 40 degrees    Diagnosis       Sinus tachycardia  Right atrial enlargement  Nonspecific ST abnormality  Prolonged QT  Abnormal ECG  When compared with ECG of 19-MAY-2020 13:06,  Non-specific change in ST segment in Inferior leads  ST now depressed in Anterolateral leads  Confirmed by Suha Car MD (), PARMJIT DAVIES ([id number]) on 2/16/2022 1:15:05 PM     LIPASE    Collection Time: 02/16/22  1:14 PM   Result Value Ref Range    Lipase 75 73 - 953 U/L   METABOLIC PANEL, COMPREHENSIVE    Collection Time: 02/16/22  1:14 PM   Result Value Ref Range    Sodium 139 138 - 145 mmol/L    Potassium 3.9 3.5 - 5.1 mmol/L    Chloride 96 (L) 98 - 107 mmol/L    CO2 26 21 - 32 mmol/L    Anion gap 17 (H) 7 - 16 mmol/L    Glucose 1,410 (HH) 65 - 100 mg/dL    BUN 28 (H) 6 - 23 MG/DL    Creatinine 1.90 (H) 0.8 - 1.5 MG/DL    GFR est AA 48 (L) >60 ml/min/1.73m2    GFR est non-AA 40 (L) >60 ml/min/1.73m2    Calcium 11.5 (H) 8.3 - 10.4 MG/DL    Bilirubin, total 0.7 0.2 - 1.1 MG/DL    ALT (SGPT) 35 12 - 65 U/L    AST (SGOT) 14 (L) 15 - 37 U/L    Alk.  phosphatase 117 50 - 136 U/L    Protein, total 9.4 (H) 6.3 - 8.2 g/dL    Albumin 4.7 3.5 - 5.0 g/dL    Globulin 4.7 (H) 2.3 - 3.5 g/dL    A-G Ratio 1.0 (L) 1.2 - 3.5     CULTURE, BLOOD    Collection Time: 02/16/22  1:14 PM    Specimen: Blood   Result Value Ref Range    Special Requests: LEFT  Antecubital        Culture result: NO GROWTH AFTER 17 HOURS     LACTIC ACID    Collection Time: 02/16/22  1:14 PM   Result Value Ref Range    Lactic acid 3.3 (H) 0.4 - 2.0 MMOL/L   COVID-19 RAPID TEST    Collection Time: 02/16/22  1:14 PM   Result Value Ref Range    Specimen source Nasopharyngeal      COVID-19 rapid test Not detected NOTD     AMMONIA    Collection Time: 02/16/22  1:14 PM   Result Value Ref Range    Ammonia 33 (H) 11 - 32 UMOL/L   CULTURE, BLOOD    Collection Time: 02/16/22  1:31 PM    Specimen: Blood   Result Value Ref Range    Special Requests: RIGHT  Antecubital        Culture result: NO GROWTH AFTER 17 HOURS     POC VENOUS BLOOD GAS    Collection Time: 02/16/22  3:44 PM   Result Value Ref Range    Device: ROOM AIR      FIO2 (POC) 21 %    pH, venous (POC) 7.37 7.32 - 7.42      pCO2, venous (POC) 34.4 (L) 41 - 51 MMHG    pO2, venous (POC) 56 mmHg    HCO3, venous (POC) 19.6 (L) 23 - 28 MMOL/L    sO2, venous (POC) 88.2 (H) 65 - 88 %    Base deficit, venous (POC) 4.9 mmol/L    Allens test (POC) Negative      Specimen type (POC) VENOUS BLOOD      Performed by Beth Berumen    Collection Time: 02/16/22  4:20 PM   Result Value Ref Range    Glucose 601 mg/dL    Insulin order 10.8 units/hour    Insulin adminstered 10.8 units/hour    Multiplier 0.020     Low target 200 mg/dL    High target 300 mg/dL    D50 order 0.0 ml    D50 administered 0.00 ml    Minutes until next BG 60 min    Order initials GM     Administered initials MR      GLSCOM Comments     HEMOGLOBIN A1C WITH EAG    Collection Time: 02/16/22  4:54 PM   Result Value Ref Range    Hemoglobin A1c >16.0 (H) 4.20 - 6.30 %    Est. average glucose Cannot be calculated mg/dL   ETHYL ALCOHOL    Collection Time: 02/16/22  4:54 PM   Result Value Ref Range    ALCOHOL(ETHYL),SERUM <3 MG/DL   PROCALCITONIN    Collection Time: 02/16/22  4:54 PM   Result Value Ref Range    Procalcitonin 0.16 0.00 - 0.49 ng/mL   GLUCOSE, POC    Collection Time: 02/16/22  4:56 PM   Result Value Ref Range    Glucose (POC) >600 (HH) 65 - 100 mg/dL    Performed by "[facility] METABOLIC PANEL, BASIC    Collection Time: 02/16/22  4:57 PM   Result Value Ref Range    Sodium 149 (H) 136 - 145 mmol/L    Potassium 3.0 (L) 3.5 - 5.1 mmol/L    Chloride 113 (H) 98 - 107 mmol/L    CO2 21 21 - 32 mmol/L    Anion gap 15 7 - 16 mmol/L    Glucose 1,047 (HH) 65 - 100 mg/dL    BUN 33 (H) 6 - 23 MG/DL    Creatinine 1.70 (H) 0.8 - 1.5 MG/DL    GFR est AA 54 (L) >60 ml/min/1.73m2    GFR est non-AA 45 (L) >60 ml/min/1.73m2    Calcium 10.6 (H) 8.3 - 10.4 MG/DL   MAGNESIUM    Collection Time: 02/16/22  4:57 PM   Result Value Ref Range    Magnesium 2.8 (H) 1.8 - 2.4 mg/dL   PHOSPHORUS    Collection Time: 02/16/22  4:57 PM   Result Value Ref Range    Phosphorus 3.7 2.5 - 4.5 MG/DL   GLUCOSTABILIZER    Collection Time: 02/16/22  5:55 PM   Result Value Ref Range    Glucose 601 mg/dL    Insulin order 16.2 units/hour    Insulin adminstered 16.2 units/hour    Multiplier 0.030     Low target 200 mg/dL    High target 300 mg/dL    D50 order 0.0 ml    D50 administered 0.00 ml    Minutes until next BG 60 min    Order initials GM     Administered initials AC     GLSCOM Comments     GLUCOSE, POC    Collection Time: 02/16/22  6:51 PM   Result Value Ref Range    Glucose (POC) >600 (HH) 65 - 100 mg/dL    Per[facility] by [facility]    METABOLIC PANEL, BASIC    Collection Time: 02/16/22  6:53 PM   Result Value Ref Range    Sodium 154 (H) 138 - 145 mmol/L    Potassium 3.4 (L) 3.5 - 5.1 mmol/L    Chloride 119 (H) 98 - 107 mmol/L    CO2 22 21 - 32 mmol/L    Anion gap 13 7 - 16 mmol/L    Glucose 738 (HH) 65 - 100 mg/dL    BUN 28 (H) 6 - 23 MG/DL    Creatinine 1.90 (H) 0.8 - 1.5 MG/DL    GFR est AA 48 (L) >60 ml/min/1.73m2    GFR est non-AA 40 (L) >60 ml/min/1.73m2    Calcium 10.6 (H) 8.3 - 10.4 MG/DL   MAGNESIUM    Collection Time: 02/16/22  6:53 PM   Result Value Ref Range    Magnesium 2.7 (H) 1.8 - 2.4 mg/dL   GLUCOSTABILIZER    Collection Time: 02/16/22  6:58 PM   Result Value Ref Range    Glucose 601 mg/dL    Insulin order 21.6 units/hour    Insulin adminstered 21.6 units/hour    Multiplier 0.040     Low target 200 mg/dL    High target 300 mg/dL    D50 order 0.0 ml    D50 administered 0.00 ml    Minutes until next BG 60 min    Order initials GM     Administered initials [address] [facility] Comments     GLUCOSE, POC    Collection Time: 02/16/22  8:06 PM   Result Value Ref Range    Glucose (POC) 508 (HH) 65 - 100 mg/dL    Performed by Tal    GLUCOSTABILIZER    Collection Time: 02/16/22  8:07 PM   Result Value Ref Range    Glucose 508 mg/dL    Insulin order 17.9 units/hour    Insulin adminstered 17.9 units/hour    Multiplier 0.040     Low target 200 mg/dL    High target 300 mg/dL    D50 order 0.0 ml    D50 administered 0.00 ml    Minutes until next BG 60 min    Order initials LF     Administered initials KEARA     GLSCOM Comments     GLUCOSE, POC    Collection Time: 02/16/22  9:08 PM   Result Value Ref Range    Glucose (POC) 459 (HH) 65 - 100 mg/dL    Performed by Demetrio Frank    Collection Time: 02/16/22  9:12 PM   Result Value Ref Range    Glucose 459 mg/dL    Insulin order 8.0 units/hour    Insulin adminstered 8.0 units/hour    Multiplier 0.020     Low target 150 mg/dL    High target 250 mg/dL    D50 order 0.0 ml    D50 administered 0.00 ml    Minutes until next BG 60 min    Order initials isaiah     Administered initials isaiah     GLSCOM Comments     GLUCOSE, POC    Collection Time: 02/16/22 10:16 PM   Result Value Ref Range    Glucose (POC) 443 (H) 65 - 100 mg/dL    Performed by Demetrio Frank    Collection Time: 02/16/22 10:18 PM   Result Value Ref Range    Glucose 443 mg/dL    Insulin order 11.5 units/hour    Insulin adminstered 11.5 units/hour    Multiplier 0.030     Low target 150 mg/dL    High target 250 mg/dL    D50 order 0.0 ml    D50 administered 0.00 ml    Minutes until next BG 60 min    Order initials isaiah     Administered initials isaiah     GLSC[facility] Comments     METABOLIC PANEL, BASIC    Collection Time: 02/16/22 10:52 PM   Result Value Ref Range    Sodium 160 (H) 136 - 145 mmol/L    Potassium 3.0 (L) 3.5 - 5.1 mmol/L    Chloride 126 (H) 98 - 107 mmol/L    CO2 29 21 - 32 mmol/L    Anion gap 5 (L) 7 - 16 mmol/L    Glucose 448 (H) 65 - 100 mg/dL    BUN 30 (H) 6 - 23 MG/DL    Creatinine 1.60 (H) 0.8 - 1.5 MG/DL    GFR est AA 58 (L) >60 ml/min/1.73m2    GFR est non-AA 48 (L) >60 ml/min/1.73m2    Calcium 10.5 (H) 8.3 - 10.4 MG/DL   MAGNESIUM    Collection Time: 02/16/22 10:52 PM   Result Value Ref Range    Magnesium 2.9 (H) 1.8 - 2.4 mg/dL   LACTIC ACID    Collection Time: 02/16/22 10:52 PM   Result Value Ref Range    Lactic acid 3.7 (HH) 0.4 - 2.0 MMOL/L   GLUCOSE, POC    Collection Time: 02/16/22 11:19 PM   Result Value Ref Range    Glucose (POC) 364 (H) 65 - 100 mg/dL    Performed by [employer]    Collection Time: 02/16/22 11:20 PM   Result Value Ref Range    Glucose 364 mg/dL    Insulin order 9.1 units/hour    Insulin adminstered 9.1 units/hour    Multiplier 0.030     Low target 150 mg/dL    High target 250 mg/dL    D50 order 0.0 ml    D50 administered 0.00 ml    Minutes until next BG 60 min    Order initials isaiah     Administered initials isaiah     GLSC[facility] Comments     GLUCOSE, POC    Collection Time: 02/17/22 12:17 AM   Result Value Ref Range    Glucose (POC) 378 (H) 65 - 100 mg/dL    Performed by [employer]    Collection Time: 02/17/22 12:18 AM   Result Value Ref Range    Glucose 378 mg/dL    Insulin order 12.7 units/hour    Insulin adminstered 12.7 units/hour    Multiplier 0.040     Low target 150 mg/dL    High target 250 mg/dL    D50 order 0.0 ml    D50 administered 0.00 ml    Minutes until next BG 60 min    Order initials isaiah     Administered initials isaiah     GLSCOM Comments     GLUCOSE, POC    Collection Time: 02/17/22  1:29 AM   Result Value Ref Range    Glucose (POC) 262 (H) 65 - 100 mg/dL    Performed by Nolvia Bolaños Collection Time: 02/17/22  1:32 AM   Result Value Ref Range    Glucose 262 mg/dL    Insulin order 8.1 units/hour    Insulin adminstered 8.1 units/hour    Multiplier 0.040     Low target 150 mg/dL    High target 250 mg/dL    D50 order 0.0 ml    D50 administered 0.00 ml    Minutes until next BG 60 min    Order initials isaiah     Administered initials isaiah     GL[facility] Comments     GLUCOSE, POC    Collection Time: 02/17/22  2:35 AM   Result Value Ref Range    Glucose (POC) 266 (H) 65 - 100 mg/dL    Performed by [employer]    Collection Time: 02/17/22  2:37 AM   Result Value Ref Range    Glucose 266 mg/dL    Insulin order 10.3 units/hour    Insulin adminstered 10.3 units/hour    Multiplier 0.050     Low target 150 mg/dL    High target 250 mg/dL    D50 order 0.0 ml    D50 administered 0.00 ml    Minutes until next BG 60 min    Order initials isaiah     Administered initials isaiah     GLSC[facility] Comments     URINALYSIS W/ RFLX MICROSCOPIC    Collection Time: 02/17/22  3:15 AM   Result Value Ref Range    Color YELLOW      Appearance CLEAR      Specific gravity 1.043 (H) 1.001 - 1.023      pH (UA) 5.5 5.0 - 9.0      Protein 30 (A) NEG mg/dL    Glucose >1,000 mg/dL    Ketone TRACE (A) NEG mg/dL    Bilirubin Negative NEG      Blood SMALL (A) NEG      Urobilinogen 0.2 0.2 - 1.0 EU/dL    Nitrites Negative NEG      Leukocyte Esterase Negative NEG      Bacteria 0 0 /hpf    Casts HYALINE 0 /lpf    Other observations RESULTS VERIFIED MANUALLY     DRUG SCREEN, URINE    Collection Time: 02/17/22  3:15 AM   Result Value Ref Range    PCP(PHENCYCLIDINE) Negative      BENZODIAZEPINES Negative      COCAINE Negative      AMPHETAMINES Negative      METHADONE Negative      THC (TH-CANNABINOL) Negative      OPIATES Negative      BARBITURATES Negative     METABOLIC PANEL, BASIC    Collection Time: 02/17/22  3:15 AM   Result Value Ref Range    Sodium 164 (HH) 138 - 145 mmol/L    Potassium 3.1 (L) 3.5 - 5.1 mmol/L    Chloride 128 (H) 98 - 107 mmol/L    CO2 28 21 - 32 mmol/L    Anion gap 8 7 - 16 mmol/L    Glucose 223 (H) 65 - 100 mg/dL    BUN 30 (H) 6 - 23 MG/DL    Creatinine 1.70 (H) 0.8 - 1.5 MG/DL    GFR est AA 54 (L) >60 ml/min/1.73m2    GFR est non-AA 45 (L) >60 ml/min/1.73m2    Calcium 10.5 (H) 8.3 - 10.4 MG/DL   MAGNESIUM    Collection Time: 02/17/22  3:15 AM   Result Value Ref Range    Magnesium 2.7 (H) 1.8 - 2.4 mg/dL   CBC WITH AUTOMATED DIFF    Collection Time: 02/17/22  3:15 AM   Result Value Ref Range    WBC 14.6 (H) 4.3 - 11.1 K/uL    RBC 6.41 (H) 4.23 - 5.6 M/uL    HGB 16.4 13.6 - 17.2 g/dL    HCT 50.8 (H) 41.1 - 50.3 %    MCV 79.3 (L) 79.6 - 97.8 FL    MCH 25.6 (L) 26.1 - 32.9 PG    MCHC 32.3 31.4 - 35.0 g/dL    RDW 17.3 (H) 11.9 - 14.6 %    PLATELET 430 352 - 313 K/uL    MPV 10.4 9.4 - 12.3 FL    ABSOLUTE NRBC 0.00 0.0 - 0.2 K/uL    DF AUTOMATED      NEUTROPHILS 76 43 - 78 %    LYMPHOCYTES 13 13 - 44 %    MONOCYTES 11 4.0 - 12.0 %    EOSINOPHILS 0 (L) 0.5 - 7.8 %    BASOPHILS 0 0.0 - 2.0 %    IMMATURE GRANULOCYTES 1 0.0 - 5.0 %    ABS. NEUTROPHILS 11.1 (H) 1.7 - 8.2 K/UL    ABS. LYMPHOCYTES 1.8 0.5 - 4.6 K/UL    ABS. MONOCYTES 1.6 (H) 0.1 - 1.3 K/UL    ABS. EOSINOPHILS 0.0 0.0 - 0.8 K/UL    ABS. BASOPHILS 0.1 0.0 - 0.2 K/UL    ABS. IMM.  GRANS. 0.1 0.0 - 0.5 K/UL   LACTIC ACID    Collection Time: 02/17/22  3:15 AM   Result Value Ref Range    Lactic acid 5.0 (HH) 0.4 - 2.0 MMOL/L   GLUCOSE, POC    Collection Time: 02/17/22  3:38 AM   Result Value Ref Range    Glucose (POC) 175 (H) 65 - 100 mg/dL    Performed by Ian Saravia    Collection Time: 02/17/22  3:39 AM   Result Value Ref Range    Glucose 175 mg/dL    Insulin order 5.8 units/hour    Insulin adminstered 5.8 units/hour    Multiplier 0.050     Low target 150 mg/dL    High target 250 mg/dL    D50 order 0.0 ml    D50 administered 0.00 ml    Minutes until next BG 60 min    Order initials isaiah     Administered initials isaiah     GLSCOM Comments GLUCOSE, POC    Collection Time: 02/17/22  4:54 AM   Result Value Ref Range    Glucose (POC) 238 (H) 65 - 100 mg/dL    Performed by Molly Ledezma    Collection Time: 02/17/22  4:54 AM   Result Value Ref Range    Glucose 238 mg/dL    Insulin order 8.9 units/hour    Insulin adminstered 8.9 units/hour    Multiplier 0.050     Low target 150 mg/dL    High target 250 mg/dL    D50 order 0.0 ml    D50 administered 0.00 ml    Minutes until next BG 60 min    Order initials NICHO     Administered initials NICHO     GLSCOM Comments     GLUCOSE, POC    Collection Time: 02/17/22  5:59 AM   Result Value Ref Range    Glucose (POC) 175 (H) 65 - 100 mg/dL    Performed by Molly Ledezma    Collection Time: 02/17/22  6:00 AM   Result Value Ref Range    Glucose 175 mg/dL    Insulin order 5.8 units/hour    Insulin adminstered 5.8 units/hour    Multiplier 0.050     Low target 150 mg/dL    High target 250 mg/dL    D50 order 0.0 ml    D50 administered 0.00 ml    Minutes until next BG 60 min    Order initials nicho     Administered initials nicho     GLSCOM Comments     GLUCOSE, POC    Collection Time: 02/17/22  6:58 AM   Result Value Ref Range    Glucose (POC) 115 (H) 65 - 100 mg/dL    Performed by Molly Ledezma    Collection Time: 02/17/22  7:00 AM   Result Value Ref Range    Glucose 115 mg/dL    Insulin order 2.2 units/hour    Insulin adminstered 2.2 units/hour    Multiplier 0.040     Low target 150 mg/dL    High target 250 mg/dL    D50 order 0.0 ml    D50 administered 0.00 ml    Minutes until next BG 60 min    Order initials NICHO     Administered initials NICHO     GLSCOM Comments     METABOLIC PANEL, BASIC    Collection Time: 02/17/22  7:31 AM   Result Value Ref Range    Sodium 161 (HH) 138 - 145 mmol/L    Potassium 3.9 3.5 - 5.1 mmol/L    Chloride 128 (H) 98 - 107 mmol/L    CO2 32 21 - 32 mmol/L    Anion gap 1 (L) 7 - 16 mmol/L    Glucose 136 (H) 65 - 100 mg/dL    BUN 31 (H) 6 - 23 MG/DL    Creatinine 1.40 0.8 - 1.5 MG/DL    GFR est AA >60 >60 ml/min/1.73m2    GFR est non-AA 56 (L) >60 ml/min/1.73m2    Calcium 9.4 8.3 - 10.4 MG/DL   MAGNESIUM    Collection Time: 02/17/22  7:31 AM   Result Value Ref Range    Magnesium 2.5 (H) 1.8 - 2.4 mg/dL   GLUCOSE, POC    Collection Time: 02/17/22  8:06 AM   Result Value Ref Range    Glucose (POC) 177 (H) 65 - 100 mg/dL    Performed by [facility]    GLUCOSTABILIZER    Collection Time: 02/17/22  8:12 AM   Result Value Ref Range    Glucose 177 mg/dL    Insulin order 4.7 units/hour    Insulin adminstered 4.7 units/hour    Multiplier 0.040     Low target 150 mg/dL    High target 250 mg/dL    D50 order 0.0 ml    D50 administered 0.00 ml    Minutes until next BG 60 min    Order initials jm     Administered initials jm     GLSCOM Comments     GLUCOSE, POC    Collection Time: 02/17/22  8:51 AM   Result Value Ref Range    Glucose (POC) 231 (H) 65 - 100 mg/dL    [facility]    LACTIC ACID    Collection Time: 02/17/22 10:18 AM   Result Value Ref Range    Lactic acid 0.8 0.4 - 2.0 MMOL/L   METABOLIC PANEL, BASIC    Collection Time: 02/17/22 10:18 AM   Result Value Ref Range    Sodium 161 (HH) 138 - 145 mmol/L    Potassium 4.0 3.5 - 5.1 mmol/L    Chloride 127 (H) 98 - 107 mmol/L    CO2 27 21 - 32 mmol/L    Anion gap 7 7 - 16 mmol/L    Glucose 361 (H) 65 - 100 mg/dL    BUN 31 (H) 6 - 23 MG/DL    Creatinine 1.40 0.8 - 1.5 MG/DL    GFR est AA >60 >60 ml/min/1.73m2    GFR est non-AA 56 (L) >60 ml/min/1.73m2    Calcium 9.3 8.3 - 10.4 MG/DL   MAGNESIUM    Collection Time: 02/17/22 10:18 AM   Result Value Ref Range    Magnesium 2.4 1.8 - 2.4 mg/dL   GLUCOSE, POC    Collection Time: 02/17/22 11:54 AM   Result Value Ref Range    Glucose (POC) 403 (H) 65 - 100 mg/dL    Performed by Teri    GLUCOSE, POC    Collection Time: 02/17/22  4:19 PM   Result Value Ref Range    Glucose (POC) 393 (H) 65 - 100 mg/dL    Performed by MartinJohnathanBSN        All Micro Results     Procedure Component Value Units Date/Time    CULTURE, BLOOD [[id number]] Collected: 02/16/22 1314    Order Status: Completed Specimen: Blood Updated: 02/17/22 0702     Special Requests: --        LEFT  Antecubital       Culture result: NO GROWTH AFTER 17 HOURS       CULTURE, BLOOD [[id number]] Collected: 02/16/22 1331    Order Status: Completed Specimen: Blood Updated: 02/17/22 0702     Special Requests: --        RIGHT  Antecubital       Culture result: NO GROWTH AFTER 17 HOURS       COVID-19 RAPID TEST [[id number]] Collected: 02/16/22 1314    Order Status: Completed Specimen: Nasopharyngeal Updated: 02/16/22 1350     Specimen source Nasopharyngeal        COVID-19 rapid test Not detected        Comment:      The specimen is NEGATIVE for SARS-CoV-2, the novel coronavirus associated with COVID-19. A negative result does not rule out COVID-19. This test has been authorized by the FDA under an Emergency Use Authorization (EUA) for use by authorized laboratories. Fact sheet for Healthcare Providers: iTendency.uy  Fact sheet for Patients: iTendency.uy       Methodology: Isothermal Nucleic Acid Amplification               Other Studies:  XR CHEST SNGL V    Result Date: 2/17/2022  EXAMINATION: One view chest HISTORY: Right subclavian CVC insertion TECHNIQUE: Frontal chest. COMPARISON: 2/16/2022 FINDINGS:  A right central venous catheter has been placed. The tip projects over the superior vena cava at the level of the hilum. There is no significant pneumothorax. There is no pleural effusion. The heart is unchanged. No other significant interval changes. 1. Findings as described above.      XR CHEST SNGL V    Result Date: 2/16/2022  Chest portable CLINICAL INDICATION: Acute moderate shortness of breath with altered mental status, urinary incontinence, weakness, diabetes, HIV COMPARISON: 9/17/2020 TECHNIQUE: single AP portable view chest at 5:00 PM semiupright FINDINGS:  There is no evidence of consolidation, pneumothorax, pleural effusion or pulmonary edema. The mediastinal and hilar contours are stable and normal given technique, positioning. Patient is slightly rotated. Lung volumes are normal. There is artifact from external wires, tubes, support devices. .     No acute disease.        Current Meds:  Current Facility-Administered Medications   Medication Dose Route Frequency    dexmedeTOMidine in 0.9 % NaCl (PRECEDEX) 400 mcg/100 mL (4 mcg/mL) infusion soln  0.1-1.5 mcg/kg/hr IntraVENous TITRATE    LORazepam (ATIVAN) injection 1 mg  1 mg IntraVENous Q4H PRN    dextrose 5 % - 0.45% NaCl infusion  100 mL/hr IntraVENous CONTINUOUS    dextrose 5 % - 0.45% NaCl infusion 500 mL  500 mL IntraVENous CONTINUOUS    NOREPINephrine (LEVOPHED) 4 mg in 5% dextrose 250 mL infusion  0.5-16 mcg/min IntraVENous TITRATE    lactated ringers bolus infusion 1,000 mL  1,000 mL IntraVENous ONCE    etomidate (AMIDATE) 2 mg/mL injection        insulin lispro (HUMALOG) injection   SubCUTAneous Q4H    insulin glargine (LANTUS) injection 10 Units  10 Units SubCUTAneous QHS    dextrose 40% (GLUTOSE) oral gel 1 Tube  15 g Oral PRN    glucagon (GLUCAGEN) injection 1 mg  1 mg IntraMUSCular PRN    sodium chloride (NS) flush 5-40 mL  5-40 mL IntraVENous Q8H    sodium chloride (NS) flush 5-40 mL  5-40 mL IntraVENous PRN    acetaminophen (TYLENOL) tablet 650 mg  650 mg Oral Q6H PRN    Or    acetaminophen (TYLENOL) suppository 650 mg  650 mg Rectal Q6H PRN    polyethylene glycol (MIRALAX) packet 17 g  17 g Oral DAILY PRN    ondansetron (ZOFRAN ODT) tablet 4 mg  4 mg Oral Q8H PRN    Or    ondansetron (ZOFRAN) injection 4 mg  4 mg IntraVENous Q6H PRN    enoxaparin (LOVENOX) injection 40 mg  40 mg SubCUTAneous Q24H    piperacillin-tazobactam (ZOSYN) 3.375 g in 0.9% sodium chloride (MBP/ADV) 100 mL MBP  3.375 g IntraVENous Q8H    albuterol (PROVENTIL VENTOLIN) nebulizer solution 2.5 mg  2.5 mg Nebulization Q6H PRN    atorvastatin (LIPITOR) tablet 10 mg  10 mg Oral QHS    emtricitabine-tenofovir (TDF) (TRUVADA) 200-300 mg per tablet 1 Tablet  1 Tablet Oral DAILY    OLANZapine (ZyPREXA) tablet 20 mg  20 mg Oral QHS    OXcarbazepine (TRILEPTAL) tablet 300 mg  300 mg Oral Q12H    vancomycin (VANCOCIN) 1250 mg in  ml infusion  1,250 mg IntraVENous Q24H    NUTRITIONAL SUPPORT ELECTROLYTE PRN ORDERS   Does Not Apply PRN       Signed:  Victor Hugo Dejesus MD    Part of this note may have been written by using a voice dictation software. The note has been proof read but may still contain some grammatical/other typographical errors.

## 2022-02-17 NOTE — DIABETES MGMT
Patient admitted with DKA. Admitting glucose 1410. HbA1c >16%. Patient was on insulin gtt via [facility]. [facility] gtt weaning report recommending 78.8 units of basal insulin and 26.3 units prandial insulin. However noted patient in restraints and NPO. Patient is not appropriate for diabetes education at this time. Reviewed patient current regimen: Humalog correctional insulin q4h. Patient would likely benefit from low dose basal insulin to reduce risk of repeat DKA as insulin gtt has been stopped and patient has received Humalog 4 units. Noted Wt 71.2kg. Ht 5'5''. Provider updated via [employer] regarding recommendations and patient glycemic control.

## 2022-02-18 LAB
ALBUMIN SERPL-MCNC: 3.1 G/DL (ref 3.5–5)
ALBUMIN/GLOB SERPL: 1 {RATIO} (ref 1.2–3.5)
ALP SERPL-CCNC: 67 U/L (ref 50–136)
ALT SERPL-CCNC: 34 U/L (ref 12–65)
ANION GAP SERPL CALC-SCNC: 1 MMOL/L (ref 7–16)
ANION GAP SERPL CALC-SCNC: 3 MMOL/L (ref 7–16)
ANION GAP SERPL CALC-SCNC: 3 MMOL/L (ref 7–16)
ANION GAP SERPL CALC-SCNC: 4 MMOL/L (ref 7–16)
ANION GAP SERPL CALC-SCNC: 4 MMOL/L (ref 7–16)
AST SERPL-CCNC: 70 U/L (ref 15–37)
BASOPHILS # BLD AUTO: 0.1 X10E3/UL (ref 0–0.2)
BASOPHILS NFR BLD AUTO: 1 %
BILIRUB SERPL-MCNC: 0.5 MG/DL (ref 0.2–1.1)
BUN SERPL-MCNC: 24 MG/DL (ref 6–23)
BUN SERPL-MCNC: 26 MG/DL (ref 6–23)
BUN SERPL-MCNC: 28 MG/DL (ref 6–23)
BUN SERPL-MCNC: 28 MG/DL (ref 6–23)
BUN SERPL-MCNC: 30 MG/DL (ref 6–23)
CALCIUM SERPL-MCNC: 8.7 MG/DL (ref 8.3–10.4)
CALCIUM SERPL-MCNC: 8.9 MG/DL (ref 8.3–10.4)
CALCIUM SERPL-MCNC: 8.9 MG/DL (ref 8.3–10.4)
CALCIUM SERPL-MCNC: 9.2 MG/DL (ref 8.3–10.4)
CALCIUM SERPL-MCNC: 9.3 MG/DL (ref 8.3–10.4)
CD3+CD4+ CELLS # BLD: 1189 /UL (ref 359–1519)
CD3+CD4+ CELLS NFR BLD: 56.6 % (ref 30.8–58.5)
CHLORIDE SERPL-SCNC: 125 MMOL/L (ref 98–107)
CHLORIDE SERPL-SCNC: 126 MMOL/L (ref 98–107)
CHLORIDE SERPL-SCNC: 127 MMOL/L (ref 98–107)
CHLORIDE SERPL-SCNC: 130 MMOL/L (ref 98–107)
CHLORIDE SERPL-SCNC: 130 MMOL/L (ref 98–107)
CO2 SERPL-SCNC: 28 MMOL/L (ref 21–32)
CO2 SERPL-SCNC: 30 MMOL/L (ref 21–32)
CREAT SERPL-MCNC: 1.3 MG/DL (ref 0.8–1.5)
CREAT SERPL-MCNC: 1.3 MG/DL (ref 0.8–1.5)
CREAT SERPL-MCNC: 1.4 MG/DL (ref 0.8–1.5)
CREAT SERPL-MCNC: 1.5 MG/DL (ref 0.8–1.5)
CREAT SERPL-MCNC: 1.5 MG/DL (ref 0.8–1.5)
EOSINOPHIL # BLD AUTO: 0 X10E3/UL (ref 0–0.4)
EOSINOPHIL NFR BLD AUTO: 0 %
ERYTHROCYTE [DISTWIDTH] IN BLOOD BY AUTOMATED COUNT: 16.7 % (ref 11.6–15.4)
ERYTHROCYTE [DISTWIDTH] IN BLOOD BY AUTOMATED COUNT: 16.9 % (ref 11.9–14.6)
GLOBULIN SER CALC-MCNC: 3.2 G/DL (ref 2.3–3.5)
GLUCOSE BLD STRIP.AUTO-MCNC: 102 MG/DL (ref 65–100)
GLUCOSE BLD STRIP.AUTO-MCNC: 198 MG/DL (ref 65–100)
GLUCOSE BLD STRIP.AUTO-MCNC: 229 MG/DL (ref 65–100)
GLUCOSE BLD STRIP.AUTO-MCNC: 234 MG/DL (ref 65–100)
GLUCOSE BLD STRIP.AUTO-MCNC: 304 MG/DL (ref 65–100)
GLUCOSE SERPL-MCNC: 123 MG/DL (ref 65–100)
GLUCOSE SERPL-MCNC: 150 MG/DL (ref 65–100)
GLUCOSE SERPL-MCNC: 226 MG/DL (ref 65–100)
GLUCOSE SERPL-MCNC: 262 MG/DL (ref 65–100)
GLUCOSE SERPL-MCNC: 286 MG/DL (ref 65–100)
HCT VFR BLD AUTO: 43 % (ref 41.1–50.3)
HCT VFR BLD AUTO: 50.2 % (ref 37.5–51)
HGB BLD-MCNC: 13.4 G/DL (ref 13.6–17.2)
HGB BLD-MCNC: 16.7 G/DL (ref 13–17.7)
IMM GRANULOCYTES # BLD: 0 X10E3/UL (ref 0–0.1)
IMM GRANULOCYTES NFR BLD: 0 %
LYMPHOCYTES # BLD AUTO: 2.1 X10E3/UL (ref 0.7–3.1)
LYMPHOCYTES NFR BLD AUTO: 14 %
MAGNESIUM SERPL-MCNC: 2.1 MG/DL (ref 1.8–2.4)
MAGNESIUM SERPL-MCNC: 2.2 MG/DL (ref 1.8–2.4)
MAGNESIUM SERPL-MCNC: 2.3 MG/DL (ref 1.8–2.4)
MCH RBC QN AUTO: 25.7 PG (ref 26.1–32.9)
MCH RBC QN AUTO: 25.9 PG (ref 26.6–33)
MCHC RBC AUTO-ENTMCNC: 31.2 G/DL (ref 31.4–35)
MCHC RBC AUTO-ENTMCNC: 33.3 G/DL (ref 31.5–35.7)
MCV RBC AUTO: 78 FL (ref 79–97)
MCV RBC AUTO: 82.5 FL (ref 79.6–97.8)
MONOCYTES # BLD AUTO: 1.4 X10E3/UL (ref 0.1–0.9)
MONOCYTES NFR BLD AUTO: 10 %
NEUTROPHILS # BLD AUTO: 11.2 X10E3/UL (ref 1.4–7)
NEUTROPHILS NFR BLD AUTO: 75 %
NRBC # BLD: 0 K/UL (ref 0–0.2)
PLATELET # BLD AUTO: 131 K/UL (ref 150–450)
PLATELET # BLD AUTO: 208 X10E3/UL (ref 150–450)
PMV BLD AUTO: 10.6 FL (ref 9.4–12.3)
POTASSIUM SERPL-SCNC: 3.3 MMOL/L (ref 3.5–5.1)
POTASSIUM SERPL-SCNC: 3.4 MMOL/L (ref 3.5–5.1)
POTASSIUM SERPL-SCNC: 3.4 MMOL/L (ref 3.5–5.1)
POTASSIUM SERPL-SCNC: 3.5 MMOL/L (ref 3.5–5.1)
POTASSIUM SERPL-SCNC: 3.7 MMOL/L (ref 3.5–5.1)
PROT SERPL-MCNC: 6.3 G/DL (ref 6.3–8.2)
RBC # BLD AUTO: 5.21 M/UL (ref 4.23–5.6)
RBC # BLD AUTO: 6.44 X10E6/UL (ref 4.14–5.8)
SERVICE CMNT-IMP: ABNORMAL
SODIUM SERPL-SCNC: 156 MMOL/L (ref 136–145)
SODIUM SERPL-SCNC: 158 MMOL/L (ref 136–145)
SODIUM SERPL-SCNC: 158 MMOL/L (ref 138–145)
SODIUM SERPL-SCNC: 161 MMOL/L (ref 138–145)
SODIUM SERPL-SCNC: 162 MMOL/L (ref 136–145)
VANCOMYCIN SERPL-MCNC: 23 UG/ML
WBC # BLD AUTO: 13.5 K/UL (ref 4.3–11.1)
WBC # BLD AUTO: 14.9 X10E3/UL (ref 3.4–10.8)

## 2022-02-18 PROCEDURE — 74011000250 HC RX REV CODE- 250: Performed by: FAMILY MEDICINE

## 2022-02-18 PROCEDURE — 83735 ASSAY OF MAGNESIUM: CPT

## 2022-02-18 PROCEDURE — 80202 ASSAY OF VANCOMYCIN: CPT

## 2022-02-18 PROCEDURE — 74011000250 HC RX REV CODE- 250: Performed by: HOSPITALIST

## 2022-02-18 PROCEDURE — 94760 N-INVAS EAR/PLS OXIMETRY 1: CPT

## 2022-02-18 PROCEDURE — 74011250636 HC RX REV CODE- 250/636: Performed by: STUDENT IN AN ORGANIZED HEALTH CARE EDUCATION/TRAINING PROGRAM

## 2022-02-18 PROCEDURE — 74011636637 HC RX REV CODE- 636/637: Performed by: STUDENT IN AN ORGANIZED HEALTH CARE EDUCATION/TRAINING PROGRAM

## 2022-02-18 PROCEDURE — 74011000258 HC RX REV CODE- 258: Performed by: FAMILY MEDICINE

## 2022-02-18 PROCEDURE — 74011000258 HC RX REV CODE- 258: Performed by: INTERNAL MEDICINE

## 2022-02-18 PROCEDURE — 82962 GLUCOSE BLOOD TEST: CPT

## 2022-02-18 PROCEDURE — 85027 COMPLETE CBC AUTOMATED: CPT

## 2022-02-18 PROCEDURE — 65610000006 HC RM INTENSIVE CARE

## 2022-02-18 PROCEDURE — 74011000250 HC RX REV CODE- 250: Performed by: INTERNAL MEDICINE

## 2022-02-18 PROCEDURE — 74011250637 HC RX REV CODE- 250/637: Performed by: INTERNAL MEDICINE

## 2022-02-18 PROCEDURE — 80048 BASIC METABOLIC PNL TOTAL CA: CPT

## 2022-02-18 PROCEDURE — 74011250636 HC RX REV CODE- 250/636: Performed by: INTERNAL MEDICINE

## 2022-02-18 PROCEDURE — 74011250636 HC RX REV CODE- 250/636: Performed by: HOSPITALIST

## 2022-02-18 PROCEDURE — 80053 COMPREHEN METABOLIC PANEL: CPT

## 2022-02-18 PROCEDURE — 77010033678 HC OXYGEN DAILY

## 2022-02-18 RX ORDER — HALOPERIDOL 5 MG/ML
2 INJECTION INTRAMUSCULAR ONCE
Status: COMPLETED | OUTPATIENT
Start: 2022-02-18 | End: 2022-02-18

## 2022-02-18 RX ORDER — HALOPERIDOL 5 MG/ML
INJECTION INTRAMUSCULAR
Status: ACTIVE
Start: 2022-02-18 | End: 2022-02-18

## 2022-02-18 RX ORDER — POTASSIUM CHLORIDE 14.9 MG/ML
20 INJECTION INTRAVENOUS
Status: COMPLETED | OUTPATIENT
Start: 2022-02-18 | End: 2022-02-18

## 2022-02-18 RX ADMIN — NOREPINEPHRINE BITARTRATE 2 MCG/MIN: 1 INJECTION, SOLUTION, CONCENTRATE INTRAVENOUS at 07:35

## 2022-02-18 RX ADMIN — DEXMEDETOMIDINE HYDROCHLORIDE 1.3 MCG/KG/HR: 400 INJECTION INTRAVENOUS at 17:35

## 2022-02-18 RX ADMIN — LORAZEPAM 1 MG: 2 INJECTION INTRAMUSCULAR; INTRAVENOUS at 18:59

## 2022-02-18 RX ADMIN — HALOPERIDOL LACTATE 2 MG: 5 INJECTION, SOLUTION INTRAMUSCULAR at 09:41

## 2022-02-18 RX ADMIN — Medication 9 UNITS: at 20:57

## 2022-02-18 RX ADMIN — SODIUM CHLORIDE, PRESERVATIVE FREE 10 ML: 5 INJECTION INTRAVENOUS at 14:16

## 2022-02-18 RX ADMIN — WATER 10 MG: 1 INJECTION INTRAMUSCULAR; INTRAVENOUS; SUBCUTANEOUS at 21:38

## 2022-02-18 RX ADMIN — INSULIN GLARGINE 20 UNITS: 100 INJECTION, SOLUTION SUBCUTANEOUS at 21:13

## 2022-02-18 RX ADMIN — DEXMEDETOMIDINE HYDROCHLORIDE 1.1 MCG/KG/HR: 400 INJECTION INTRAVENOUS at 07:50

## 2022-02-18 RX ADMIN — Medication 6 UNITS: at 12:54

## 2022-02-18 RX ADMIN — PIPERACILLIN SODIUM,TAZOBACTAM SODIUM 3.38 G: 3; .375 INJECTION, POWDER, FOR SOLUTION INTRAVENOUS at 16:01

## 2022-02-18 RX ADMIN — PIPERACILLIN SODIUM,TAZOBACTAM SODIUM 3.38 G: 3; .375 INJECTION, POWDER, FOR SOLUTION INTRAVENOUS at 08:46

## 2022-02-18 RX ADMIN — SODIUM CHLORIDE, PRESERVATIVE FREE 10 ML: 5 INJECTION INTRAVENOUS at 05:09

## 2022-02-18 RX ADMIN — POTASSIUM CHLORIDE 20 MEQ: 14.9 INJECTION, SOLUTION INTRAVENOUS at 03:12

## 2022-02-18 RX ADMIN — LORAZEPAM 1 MG: 2 INJECTION INTRAMUSCULAR; INTRAVENOUS at 12:00

## 2022-02-18 RX ADMIN — POTASSIUM CHLORIDE 20 MEQ: 14.9 INJECTION, SOLUTION INTRAVENOUS at 01:02

## 2022-02-18 RX ADMIN — SODIUM CHLORIDE, PRESERVATIVE FREE 10 ML: 5 INJECTION INTRAVENOUS at 21:14

## 2022-02-18 RX ADMIN — LORAZEPAM 1 MG: 2 INJECTION INTRAMUSCULAR; INTRAVENOUS at 02:03

## 2022-02-18 RX ADMIN — LORAZEPAM 1 MG: 2 INJECTION INTRAMUSCULAR; INTRAVENOUS at 08:46

## 2022-02-18 RX ADMIN — ACETAMINOPHEN 650 MG: 650 SUPPOSITORY RECTAL at 03:18

## 2022-02-18 RX ADMIN — ACETAMINOPHEN 650 MG: 650 SUPPOSITORY RECTAL at 21:53

## 2022-02-18 RX ADMIN — Medication 12 UNITS: at 08:53

## 2022-02-18 RX ADMIN — DEXMEDETOMIDINE HYDROCHLORIDE 1.3 MCG/KG/HR: 400 INJECTION INTRAVENOUS at 23:03

## 2022-02-18 RX ADMIN — DEXMEDETOMIDINE HYDROCHLORIDE 1.3 MCG/KG/HR: 400 INJECTION INTRAVENOUS at 13:43

## 2022-02-18 RX ADMIN — VANCOMYCIN HYDROCHLORIDE 1250 MG: 10 INJECTION, POWDER, LYOPHILIZED, FOR SOLUTION INTRAVENOUS at 20:57

## 2022-02-18 RX ADMIN — ENOXAPARIN SODIUM 40 MG: 100 INJECTION SUBCUTANEOUS at 16:01

## 2022-02-18 RX ADMIN — DEXTROSE MONOHYDRATE 100 ML/HR: 5 INJECTION, SOLUTION INTRAVENOUS at 02:09

## 2022-02-18 RX ADMIN — LORAZEPAM 1 MG: 2 INJECTION INTRAMUSCULAR; INTRAVENOUS at 16:00

## 2022-02-18 RX ADMIN — DEXMEDETOMIDINE HYDROCHLORIDE 1.1 MCG/KG/HR: 400 INJECTION INTRAVENOUS at 02:24

## 2022-02-18 RX ADMIN — Medication 3 UNITS: at 17:00

## 2022-02-18 NOTE — PROGRESS NOTES
Physical Therapy Note:    Pt chart reviewed and evaluation attempted. Per RN the pt has been agitated today and he has requested that PT be held at this time. PT will attempt a later time/date as schedule allows and pt is able.      Thank you,  Laura Loomis, PT, DPT

## 2022-02-18 NOTE — PROGRESS NOTES
CM contacted pt's sister, Yolande Marino [phone], for CM assessment. Yolande Marino states she is pt's HCPOA. Pt also has a brother. Rhondagildardo Jamila verified pt's PCP, demographic and insurance. Pt lives alone in an apartment. Pt was independent of ADLs. Pt has a caregiver and SW through [employer]. Pt's caregiver scheduled M-F 3-4 hours. Ingrissonja Jamila states she sets up pt's medications in medication reminder. DME- none  Pt [facility] his medications at [facility] and at [facility]. Yolande Marino states pt is transported by his caregiver or he walks.   Pt was in his usual states of health, Yolande Marino informs this CM,

## 2022-02-18 NOTE — PROGRESS NOTES
Reviewed notes for  new spiritual concerns   noted the patient has multiple health concerns   will follow closely

## 2022-02-18 NOTE — DIABETES MGMT
Patient admitted with DKA. Blood glucose ranged 115-433 yesterday with patient receiving Lantus 20 units and Humalog 43 units once off insulin gtt. Lab blood glucose this morning was 123 at 0303. Reviewed patient current regimen: Lantus 20 units nightly and Humalog correctional insulin q4h resistant scale. Patient remains NPO. Per chart review non-violent restraints continued at 0500. If patient is not started on diet patient may benefit from reduction in basal insulin to reduce risk of hypoglycemia. Provider updated via [employer] regarding recommendations and patient glycemic control. Spoke with ICU staff at 1248 patient in restraints, not appropriate for education. .

## 2022-02-18 NOTE — PROGRESS NOTES
Hospitalist Progress Note   Admit Date:  2022 12:25 PM   Name:  Ele Sky   Age:  46 y.o. Sex:  male  :  1970   MRN:  [id number]   Room:  [facility], [facility]/    Presenting Complaint: Vomiting    Reason(s) for Admission: DKA (diabetic ketoacidosis) ([facility] [E11.10]  Sepsis [facility]) [A41.9]     Hospital Course & Interval History:   78-year-old male with a past medical history of hypertension, HIV, schizophrenia, insulin-dependent diabetes mellitus, presented in the setting of altered mental status. Most of the history obtained by brother at bedside since patient unable to provide any details. According to the brother and caregiver he has been presenting with worsening mentation for the last few days, the got worse over time so they decided to bring him to the emergency department. Otherwise they have not seen any coughing or complaining of chest pain also no evidence of abdominal pain nausea vomiting or diarrhea. In the emergency department the patient was found to be tachycardic, with leukocytosis and a blood sugar of 1410. He was a started on insulin drip. He was given IV fluids and IV antibiotics. Subjective/24hr Events (22): Patient was seen and examined at the bedside. Patient in wrist restraints. ROS:  10 systems reviewed and negative except as noted above. Assessment & Plan:   DKA (diabetic ketoacidosis) ([facility] (2022)  Initially started on insulin drip with glucose stabilizer; currently off insulin drip due to resolution of anion gap  Continue to monitor blood glucose  Continue to monitor electrolytes closely and replete accordingly  A1c greater than 16  Diabetic educator pending  Currently on Lantus 10 units, sliding scale insulin  We will consider decreasing basal insulin if patient continues not to eat anything.     Concern for sepsis(tachycardia, leukocytosis) of unclear etiology  Continue IV fluids  Continue Vanco and Zosyn  Pro-Richi 0.16  UA negative  Lactic acid 1 8  Chest x-ray without pneumonia    Acute metabolic encephalopathy likely in the setting of DKA  CT of the brain without any acute intracranial abnormalities  Patient has a history of schizophrenia not taking his medications  Continue to monitor mental status  Avoid sedatives  Delirium precautions    Hypernatremia  Was initially on D5 half-normal due to hypernatremia  Fluid started to D5W since sodium still at 161  2/18 sodium coming down currently 158  Follow-up morning BMP    Hyperlipidemia  Continue home medication    Hypertension  Hypertensives on hold at this point  Central line placed due to hypotension    Schizophrenia  We will continue patient's home medications  Apparently patient was noncompliant with his home medications, currently in four-point restraints with sedation      Dispo/Discharge Planning:    Dispo pending clinical course    Diet:  DIET NPO  DVT PPx: Lovenox  Code status: Full Code    Hospital Problems as of 2/18/2022 Never Reviewed          Codes Class Noted - Resolved POA    * (Principal) DKA (diabetic ketoacidosis) ([facility] ICD-10-CM: E11.10  ICD-9-CM: 250.12  2/16/2022 - Present Unknown        Sepsis ([facility] ICD-10-CM: A41.9  ICD-9-CM: 038.9, 995.91  2/16/2022 - Present Unknown        Schizophrenia ([facility] ICD-10-CM: F20.9  ICD-9-CM: 295.90  3/15/2020 - Present Yes        HIV (human immunodeficiency virus infection) ([facility] ICD-10-CM: B20  ICD-9-CM: V08  6/4/2014 - Present Yes              Objective:     Patient Vitals for the past 24 hrs:   Temp Pulse Resp BP SpO2   02/18/22 1346  (!) 55   100 %   02/18/22 1331  67  (!) 103/58 100 %   02/18/22 1316  (!) 55   100 %   02/18/22 1301  (!) 59  (!) 110/55 100 %   02/18/22 1246  (!) 56   99 %   02/18/22 1231  (!) 56  114/62 99 %   02/18/22 1216  (!) 57   99 %   02/18/22 1201  71   100 %   02/18/22 1146  (!) 55   100 %   02/18/22 1131  (!) 55  112/62 100 %   02/18/22 1117 98.8 °F (37.1 °C) (!) 56 22 (!) 108/56    02/18/22 1116  (!) 55   100 %   02/18/22 1101 98.9 °F (37.2 °C) (!) 56  (!) 108/56 99 %   02/18/22 1046  (!) 57   99 %   02/18/22 1031  (!) 58  107/60 97 %   02/18/22 1016  63   100 %   02/18/22 1001  (!) 57  (!) 106/57 100 %   02/18/22 0946  65  (!) 109/57 99 %   02/18/22 0931  (!) 59   99 %   02/18/22 0916  60   99 %   02/18/22 0901  66  124/71 100 %   02/18/22 0846  (!) 56   100 %   02/18/22 0831  (!) 56  125/68 100 %   02/18/22 0816  (!) 56   100 %   02/18/22 0801  (!) 56  122/68 100 %   02/18/22 0746  (!) 56   100 %   02/18/22 0731  (!) 56  121/68 100 %   02/18/22 0716  (!) 56   100 %   02/18/22 0701 98.8 °F (37.1 °C) (!) 56   100 %   02/18/22 0639 100.2 °F (37.9 °C)       02/18/22 0630  (!) 56   100 %   02/18/22 0615  (!) 57   100 %   02/18/22 0600  (!) 57   100 %   02/18/22 0545  (!) 57   100 %   02/18/22 0530  (!) 57   100 %   02/18/22 0515  (!) 57   100 %   02/18/22 0500  (!) 57   100 %   02/18/22 0445  (!) 57   100 %   02/18/22 0430  (!) 57   100 %   02/18/22 0415  (!) 57   100 %   02/18/22 0400  (!) 57   100 %   02/18/22 0345  (!) 58   100 %   02/18/22 0330  (!) 58   100 %   02/18/22 0319  (!) 57   99 %   02/18/22 0315  (!) 58   98 %   02/18/22 0304  60   100 %   02/18/22 0300 (!) 101.4 °F (38.6 °C) (!) 57   100 %   02/18/22 0249  (!) 58  107/63 100 %   02/18/22 0234  (!) 58  102/62 100 %   02/18/22 0219  (!) 58  104/61 100 %   02/18/22 0204  60   99 %   02/18/22 0149  (!) 56  (!) 101/57 100 %   02/18/22 0134  (!) 56  (!) 100/56 100 %   02/18/22 0119  (!) 56  (!) 101/58 100 %   02/18/22 0104  (!) 57  (!) 96/56 100 %   02/18/22 0100  (!) 57   100 %   02/18/22 0049  (!) 57  (!) 98/58 100 %   02/18/22 0045  (!) 58   100 %   02/18/22 0030  (!) 58  (!) 100/55 100 %   02/18/22 0015  60  (!) 105/58 99 %   02/18/22 0000  (!) 58   99 %   02/17/22 2345  (!) 58  (!) 95/52 95 %   02/17/22 2330  70   99 %   02/17/22 2315  60  (!) 96/52 92 %   02/17/22 2300 99.5 °F (37.5 °C) 60 22 (!) 101/57 98 %   02/17/22 2245  (!) 59  (!) 103/57 98 %   02/17/22 2230  (!) 54  (!) 96/57 100 %   02/17/22 2215  (!) 54  (!) 96/55 100 %   02/17/22 2200  (!) 54  (!) 98/56 100 %   02/17/22 2145  (!) 55  (!) 101/57 100 %   02/17/22 2130  (!) 56  (!) 102/58 100 %   02/17/22 2115  (!) 57  (!) 101/58 100 %   02/17/22 2100  (!) 59  (!) 102/58 100 %   02/17/22 2045  64   100 %   02/17/22 2030  (!) 59  (!) 100/55 100 %   02/17/22 2015  60  (!) 100/59 100 %   02/17/22 2000  60  (!) 100/57 99 %   02/17/22 1945  (!) 58   100 %   02/17/22 1930  (!) 58  (!) 92/54 100 %   02/17/22 1915  (!) 59  (!) 91/54 100 %   02/17/22 1900 100 °F (37.8 °C) (!) 59 22 (!) 95/54 100 %   02/17/22 1853     100 %   02/17/22 1801  79   100 %   02/17/22 1746  (!) 57  (!) 95/55 100 %   02/17/22 1731  (!) 57  (!) 98/53 100 %   02/17/22 1716  (!) 57  (!) 97/58 100 %   02/17/22 1701  (!) 57  104/60 100 %     Oxygen Therapy  O2 Sat (%): 100 % (02/18/22 1346)  Pulse via Oximetry: 55 beats per minute (02/18/22 1346)  O2 Device: Nasal cannula (02/18/22 0701)  Skin Assessment: Clean, dry, & intact (02/18/22 0701)  Skin Protection for O2 Device: N/A (02/18/22 0049)  O2 Flow Rate (L/min): 3 l/min (02/18/22 0701)    Estimated body mass index is 22.13 kg/m² as calculated from the following:    Height as of 2/13/22: 5' 5\" (1.651 m). Weight as of this encounter: 60.3 kg (133 lb). Intake/Output Summary (Last 24 hours) at 2/18/2022 1653  Last data filed at 2/18/2022 1101  Gross per 24 hour   Intake 1684.27 ml   Output 550 ml   Net 1134.27 ml         Physical Exam:   Blood pressure (!) 103/58, pulse (!) 55, temperature 98.8 °F (37.1 °C), resp. rate 22, weight 60.3 kg (133 lb), SpO2 100 %.   General:    Sedated  Head:  Normocephalic, atraumatic  Eyes:  Sclerae appear normal.  Pupils equally round.  ENT:  Nares appear normal, no drainage. Moist oral mucosa  Neck:  No restricted ROM. Trachea midline   CV:   RRR. No m/r/g. No jugular venous distension. Lungs:   CTAB. No wheezing, rhonchi, or rales. Respirations even, unlabored  Abdomen: Bowel sounds present. Soft, nontender, nondistended. Extremities: No cyanosis or clubbing. No edema  Skin:     No rashes and normal coloration. Warm and dry. Neuro:  CN II-XII grossly intact. Sensation intact.   A&Ox3  Psych:  Able to evaluate due to sedation    I have reviewed ordered lab tests and independently visualized imaging below:    Recent Labs:  Recent Results (from the past 48 hour(s))   HEMOGLOBIN A1C WITH EAG    Collection Time: 02/16/22  4:54 PM   Result Value Ref Range    Hemoglobin A1c >16.0 (H) 4.20 - 6.30 %    Est. average glucose Cannot be calculated mg/dL   ETHYL ALCOHOL    Collection Time: 02/16/22  4:54 PM   Result Value Ref Range    ALCOHOL(ETHYL),SERUM <3 MG/DL   PROCALCITONIN    Collection Time: 02/16/22  4:54 PM   Result Value Ref Range    Procalcitonin 0.16 0.00 - 0.49 ng/mL   GLUCOSE, POC    Collection Time: 02/16/22  4:56 PM   Result Value Ref Range    Glucose (POC) >600 (HH) 65 - 100 mg/dL    Performed by MalaneyGinaMSN    METABOLIC PANEL, BASIC    Collection Time: 02/16/22  4:57 PM   Result Value Ref Range    Sodium 149 (H) 136 - 145 mmol/L    Potassium 3.0 (L) 3.5 - 5.1 mmol/L    Chloride 113 (H) 98 - 107 mmol/L    CO2 21 21 - 32 mmol/L    Anion gap 15 7 - 16 mmol/L    Glucose 1,047 (HH) 65 - 100 mg/dL    BUN 33 (H) 6 - 23 MG/DL    Creatinine 1.70 (H) 0.8 - 1.5 MG/DL    GFR est AA 54 (L) >60 ml/min/1.73m2    GFR est non-AA 45 (L) >60 ml/min/1.73m2    Calcium 10.6 (H) 8.3 - 10.4 MG/DL   MAGNESIUM    Collection Time: 02/16/22  4:57 PM   Result Value Ref Range    Magnesium 2.8 (H) 1.8 - 2.4 mg/dL   PHOSPHORUS    Collection Time: 02/16/22  4:57 PM   Result Value Ref Range    Phosphorus 3.7 2.5 - 4.5 MG/DL   GLUCOSTABILIZER Collection Time: 02/16/22  5:55 PM   Result Value Ref Range    Glucose 601 mg/dL    Insulin order 16.2 units/hour    Insulin adminstered 16.2 units/hour    Multiplier 0.030     Low target 200 mg/dL    High target 300 mg/dL    D50 order 0.0 ml    D50 administered 0.00 ml    Minutes until next BG 60 min    Order initials GM     Administered initials      GLSCOM Comments     GLUCOSE, POC    Collection Time: 02/16/22  6:51 PM   Result Value Ref Range    Glucose (POC) >600 (HH) 65 - 100 mg/dL    Performed by Chalino    METABOLIC PANEL, BASIC    Collection Time: 02/16/22  6:53 PM   Result Value Ref Range    Sodium 154 (H) 138 - 145 mmol/L    Potassium 3.4 (L) 3.5 - 5.1 mmol/L    Chloride 119 (H) 98 - 107 mmol/L    CO2 22 21 - 32 mmol/L    Anion gap 13 7 - 16 mmol/L    Glucose 738 (HH) 65 - 100 mg/dL    BUN 28 (H) 6 - 23 MG/DL    Creatinine 1.90 (H) 0.8 - 1.5 MG/DL    GFR est AA 48 (L) >60 ml/min/1.73m2    GFR est non-AA 40 (L) >60 ml/min/1.73m2    Calcium 10.6 (H) 8.3 - 10.4 MG/DL   MAGNESIUM    Collection Time: 02/16/22  6:53 PM   Result Value Ref Range    Magnesium 2.7 (H) 1.8 - 2.4 mg/dL   GLUCOSTABILIZER    Collection Time: 02/16/22  6:58 PM   Result Value Ref Range    Glucose 601 mg/dL    Insulin order 21.6 units/hour    Insulin adminstered 21.6 units/hour    Multiplier 0.040     Low target 200 mg/dL    High target 300 mg/dL    D50 order 0.0 ml    D50 administered 0.00 ml    Minutes until next BG 60 min    Order initials GM     Administered initials [facility]      GLSCOM Comments     GLUCOSE, POC    Collection Time: 02/16/22  8:06 PM   Result Value Ref Range    Glucose (POC) 508 (HH) 65 - 100 mg/dL    Performed by Tal    GLUCOSTABILIZER    Collection Time: 02/16/22  8:07 PM   Result Value Ref Range    Glucose 508 mg/dL    Insulin order 17.9 units/hour    Insulin adminstered 17.9 units/hour    Multiplier 0.040     Low target 200 mg/dL    High target 300 mg/dL    D50 order 0.0 ml    D50 administered 0.00 ml    Minutes until next BG 60 min    Order initials LF     Administered initials KEARA     GLSCOM Comments     GLUCOSE, POC    Collection Time: 02/16/22  9:08 PM   Result Value Ref Range    Glucose (POC) 459 (HH) 65 - 100 mg/dL    Performed by Aletha Pride    Collection Time: 02/16/22  9:12 PM   Result Value Ref Range    Glucose 459 mg/dL    Insulin order 8.0 units/hour    Insulin adminstered 8.0 units/hour    Multiplier 0.020     Low target 150 mg/dL    High target 250 mg/dL    D50 order 0.0 ml    D50 administered 0.00 ml    Minutes until next BG 60 min    Order initials isaiah     Administered initials isaiah     GLSCOM Comments     GLUCOSE, POC    Collection Time: 02/16/22 10:16 PM   Result Value Ref Range    Glucose (POC) 443 (H) 65 - 100 mg/dL    Performed by Aletha Pride    Collection Time: 02/16/22 10:18 PM   Result Value Ref Range    Glucose 443 mg/dL    Insulin order 11.5 units/hour    Insulin adminstered 11.5 units/hour    Multiplier 0.030     Low target 150 mg/dL    High target 250 mg/dL    D50 order 0.0 ml    D50 administered 0.00 ml    Minutes until next BG 60 min    Order initials isaiah     Administered initials isaiah     GLSCOM Comments     METABOLIC PANEL, BASIC    Collection Time: 02/16/22 10:52 PM   Result Value Ref Range    Sodium 160 (H) 136 - 145 mmol/L    Potassium 3.0 (L) 3.5 - 5.1 mmol/L    Chloride 126 (H) 98 - 107 mmol/L    CO2 29 21 - 32 mmol/L    Anion gap 5 (L) 7 - 16 mmol/L    Glucose 448 (H) 65 - 100 mg/dL    BUN 30 (H) 6 - 23 MG/DL    Creatinine 1.60 (H) 0.8 - 1.5 MG/DL    GFR est AA 58 (L) >60 ml/min/1.73m2    GFR est non-AA 48 (L) >60 ml/min/1.73m2    Calcium 10.5 (H) 8.3 - 10.4 MG/DL   MAGNESIUM    Collection Time: 02/16/22 10:52 PM   Result Value Ref Range    Magnesium 2.9 (H) 1.8 - 2.4 mg/dL   LACTIC ACID    Collection Time: 02/16/22 10:52 PM   Result Value Ref Range    Lactic acid 3.7 (HH) 0.4 - 2.0 MMOL/L   GLUCOSE, POC    Collection Time: 02/16/22 11:19 PM   Result Value Ref Range    Glucose (POC) 364 (H) 65 - 100 mg/dL    Performed by Genoveva Rosenthal    Collection Time: 02/16/22 11:20 PM   Result Value Ref Range    Glucose 364 mg/dL    Insulin order 9.1 units/hour    Insulin adminstered 9.1 units/hour    Multiplier 0.030     Low target 150 mg/dL    High target 250 mg/dL    D50 order 0.0 ml    D50 administered 0.00 ml    Minutes until next BG 60 min    Order initials isaiah     Administered initials isaiah     GLSCOM Comments     GLUCOSE, POC    Collection Time: 02/17/22 12:17 AM   Result Value Ref Range    Glucose (POC) 378 (H) 65 - 100 mg/dL    Performed by Genoveva Rosenthal    Collection Time: 02/17/22 12:18 AM   Result Value Ref Range    Glucose 378 mg/dL    Insulin order 12.7 units/hour    Insulin adminstered 12.7 units/hour    Multiplier 0.040     Low target 150 mg/dL    High target 250 mg/dL    D50 order 0.0 ml    D50 administered 0.00 ml    Minutes until next BG 60 min    Order initials isaiah     Administered initials isaiah     GLSCOM Comments     GLUCOSE, POC    Collection Time: 02/17/22  1:29 AM   Result Value Ref Range    Glucose (POC) 262 (H) 65 - 100 mg/dL    Performed by Nolvia Bolaños    Collection Time: 02/17/22  1:32 AM   Result Value Ref Range    Glucose 262 mg/dL    Insulin order 8.1 units/hour    Insulin adminstered 8.1 units/hour    Multiplier 0.040     Low target 150 mg/dL    High target 250 mg/dL    D50 order 0.0 ml    D50 administered 0.00 ml    Minutes until next BG 60 min    Order initials isaiah     Administered initials isaiah     GLSCOM Comments     GLUCOSE, POC    Collection Time: 02/17/22  2:35 AM   Result Value Ref Range    Glucose (POC) 266 (H) 65 - 100 mg/dL    Performed by Genoveva Rosenthal    Collection Time: 02/17/22  2:37 AM   Result Value Ref Range    Glucose 266 mg/dL    Insulin order 10.3 units/hour    Insulin adminstered 10.3 units/hour    Multiplier 0.050     Low target 150 mg/dL    High target 250 mg/dL    D50 order 0.0 ml    D50 administered 0.00 ml    Minutes until next BG 60 min    Order initials isaiah     Administered initials isaiah     GLSCOM Comments     URINALYSIS W/ RFLX MICROSCOPIC    Collection Time: 02/17/22  3:15 AM   Result Value Ref Range    Color YELLOW      Appearance CLEAR      Specific gravity 1.043 (H) 1.001 - 1.023      pH (UA) 5.5 5.0 - 9.0      Protein 30 (A) NEG mg/dL    Glucose >1,000 mg/dL    Ketone TRACE (A) NEG mg/dL    Bilirubin Negative NEG      Blood SMALL (A) NEG      Urobilinogen 0.2 0.2 - 1.0 EU/dL    Nitrites Negative NEG      Leukocyte Esterase Negative NEG      Bacteria 0 0 /hpf    Casts HYALINE 0 /lpf    Other observations RESULTS VERIFIED MANUALLY     DRUG SCREEN, URINE    Collection Time: 02/17/22  3:15 AM   Result Value Ref Range    PCP(PHENCYCLIDINE) Negative      BENZODIAZEPINES Negative      COCAINE Negative      AMPHETAMINES Negative      METHADONE Negative      THC (TH-CANNABINOL) Negative      OPIATES Negative      BARBITURATES Negative     LYMPHOCYTES, CD4 PERCENT AND ABSOLUTE    Collection Time: 02/17/22  3:15 AM   Result Value Ref Range    Abs CD4 [place] 1,189 359 - 1,519 /uL    % CD 4 Pos Lymph 56.6 30.8 - 58.5 %    WBC 14.9 (H) 3.4 - 10.8 x10E3/uL    RBC 6.44 (H) 4.14 - 5.80 x10E6/uL    HGB 16.7 13.0 - 17.7 g/dL    HCT 50.2 37.5 - 51.0 %    MCV 78 (L) 79 - 97 fL    MCH 25.9 (L) 26.6 - 33.0 pg    MCHC 33.3 31.5 - 35.7 g/dL    RDW 16.7 (H) 11.6 - 15.4 %    PLATELET 169 070 - 675 x10E3/uL    NEUTROPHILS 75 Not Estab. %    Lymphocytes 14 Not Estab. %    MONOCYTES 10 Not Estab. %    EOSINOPHILS 0 Not Estab. %    BASOPHILS 1 Not Estab. %    ABS. NEUTROPHILS 11.2 (H) 1.4 - 7.0 x10E3/uL    Abs Lymphocytes 2.1 0.7 - 3.1 x10E3/uL    ABS. MONOCYTES 1.4 (H) 0.1 - 0.9 x10E3/uL    ABS. EOSINOPHILS 0.0 0.0 - 0.4 x10E3/uL    ABS. BASOPHILS 0.1 0.0 - 0.2 x10E3/uL    IMMATURE GRANULOCYTES 0 Not Estab. %    ABS. IMM.  GRANS. 0.0 0.0 - 0.1 A25A0/WL   METABOLIC PANEL, BASIC    Collection Time: 02/17/22  3:15 AM   Result Value Ref Range    Sodium 164 (HH) 138 - 145 mmol/L    Potassium 3.1 (L) 3.5 - 5.1 mmol/L    Chloride 128 (H) 98 - 107 mmol/L    CO2 28 21 - 32 mmol/L    Anion gap 8 7 - 16 mmol/L    Glucose 223 (H) 65 - 100 mg/dL    BUN 30 (H) 6 - 23 MG/DL    Creatinine 1.70 (H) 0.8 - 1.5 MG/DL    GFR est AA 54 (L) >60 ml/min/1.73m2    GFR est non-AA 45 (L) >60 ml/min/1.73m2    Calcium 10.5 (H) 8.3 - 10.4 MG/DL   MAGNESIUM    Collection Time: 02/17/22  3:15 AM   Result Value Ref Range    Magnesium 2.7 (H) 1.8 - 2.4 mg/dL   CBC WITH AUTOMATED DIFF    Collection Time: 02/17/22  3:15 AM   Result Value Ref Range    WBC 14.6 (H) 4.3 - 11.1 K/uL    RBC 6.41 (H) 4.23 - 5.6 M/uL    HGB 16.4 13.6 - 17.2 g/dL    HCT 50.8 (H) 41.1 - 50.3 %    MCV 79.3 (L) 79.6 - 97.8 FL    MCH 25.6 (L) 26.1 - 32.9 PG    MCHC 32.3 31.4 - 35.0 g/dL    RDW 17.3 (H) 11.9 - 14.6 %    PLATELET 309 270 - 213 K/uL    MPV 10.4 9.4 - 12.3 FL    ABSOLUTE NRBC 0.00 0.0 - 0.2 K/uL    DF AUTOMATED      NEUTROPHILS 76 43 - 78 %    LYMPHOCYTES 13 13 - 44 %    MONOCYTES 11 4.0 - 12.0 %    EOSINOPHILS 0 (L) 0.5 - 7.8 %    BASOPHILS 0 0.0 - 2.0 %    IMMATURE GRANULOCYTES 1 0.0 - 5.0 %    ABS. NEUTROPHILS 11.1 (H) 1.7 - 8.2 K/UL    ABS. LYMPHOCYTES 1.8 0.5 - 4.6 K/UL    ABS. MONOCYTES 1.6 (H) 0.1 - 1.3 K/UL    ABS. EOSINOPHILS 0.0 0.0 - 0.8 K/UL    ABS. BASOPHILS 0.1 0.0 - 0.2 K/UL    ABS. IMM.  GRANS. 0.1 0.0 - 0.5 K/UL   LACTIC ACID    Collection Time: 02/17/22  3:15 AM   Result Value Ref Range    Lactic acid 5.0 (HH) 0.4 - 2.0 MMOL/L   GLUCOSE, POC    Collection Time: 02/17/22  3:38 AM   Result Value Ref Range    Glucose (POC) 175 (H) 65 - 100 mg/dL    Performed by Demetrio Frank    Collection Time: 02/17/22  3:39 AM   Result Value Ref Range    Glucose 175 mg/dL    Insulin order 5.8 units/hour    Insulin adminstered 5.8 units/hour    Multiplier 0.050     Low target 150 mg/dL    High target 250 mg/dL    D50 order 0.0 ml    D50 administered 0.00 ml    Minutes until next BG 60 min    Order initials nicho     Administered initials nicho     GLSCOM Comments     GLUCOSE, POC    Collection Time: 02/17/22  4:54 AM   Result Value Ref Range    Glucose (POC) 238 (H) 65 - 100 mg/dL    Performed by [employer]    Collection Time: 02/17/22  4:54 AM   Result Value Ref Range    Glucose 238 mg/dL    Insulin order 8.9 units/hour    Insulin adminstered 8.9 units/hour    Multiplier 0.050     Low target 150 mg/dL    High target 250 mg/dL    D50 order 0.0 ml    D50 administered 0.00 ml    Minutes until next BG 60 min    Order initials NICHO     Administered initials NICHO     GLSCOM Comments     GLUCOSE, POC    Collection Time: 02/17/22  5:59 AM   Result Value Ref Range    Glucose (POC) 175 (H) 65 - 100 mg/dL    Performed by Alaniz Raw    Collection Time: 02/17/22  6:00 AM   Result Value Ref Range    Glucose 175 mg/dL    Insulin order 5.8 units/hour    Insulin adminstered 5.8 units/hour    Multiplier 0.050     Low target 150 mg/dL    High target 250 mg/dL    D50 order 0.0 ml    D50 administered 0.00 ml    Minutes until next BG 60 min    Order initials nicho     Administered initials nicho     GLSCOM Comments     GLUCOSE, POC    Collection Time: 02/17/22  6:58 AM   Result Value Ref Range    Glucose (POC) 115 (H) 65 - 100 mg/dL    Performed by Alaniz Raw    Collection Time: 02/17/22  7:00 AM   Result Value Ref Range    Glucose 115 mg/dL    Insulin order 2.2 units/hour    Insulin adminstered 2.2 units/hour    Multiplier 0.040     Low target 150 mg/dL    High target 250 mg/dL    D50 order 0.0 ml    D50 administered 0.00 ml    Minutes until next BG 60 min    Order initials NICHO     Administered initials NICHO     GLSCOM Comments     METABOLIC PANEL, BASIC    Collection Time: 02/17/22  7:31 AM   Result Value Ref Range    Sodium 161 (HH) 138 - 145 mmol/L    Potassium 3.9 3.5 - 5.1 mmol/L    Chloride 128 (H) 98 - 107 mmol/L    CO2 32 21 - 32 mmol/L    Anion gap 1 (L) 7 - 16 mmol/L    Glucose 136 (H) 65 - 100 mg/dL    BUN 31 (H) 6 - 23 MG/DL    Creatinine 1.40 0.8 - 1.5 MG/DL    GFR est AA >60 >60 ml/min/1.73m2    GFR est non-AA 56 (L) >60 ml/min/1.73m2    Calcium 9.4 8.3 - 10.4 MG/DL   MAGNESIUM    Collection Time: 02/17/22  7:31 AM   Result Value Ref Range    Magnesium 2.5 (H) 1.8 - 2.4 mg/dL   GLUCOSE, POC    Collection Time: 02/17/22  8:06 AM   Result Value Ref Range    Glucose (POC) 177 (H) 65 - 100 mg/dL    Performed by Teri    GLUCOSTABILIZER    Collection Time: 02/17/22  8:12 AM   Result Value Ref Range    Glucose 177 mg/dL    Insulin order 4.7 units/hour    Insulin adminstered 4.7 units/hour    Multiplier 0.040     Low target 150 mg/dL    High target 250 mg/dL    D50 order 0.0 ml    D50 administered 0.00 ml    Minutes until next BG 60 min    Order initials jm     Administered initials      GLSCOM Comments     GLUCOSE, POC    Collection Time: 02/17/22  8:51 AM   Result Value Ref Range    Glucose (POC) 231 (H) 65 - 100 mg/dL    Performed by Teri    LACTIC ACID    Collection Time: 02/17/22 10:18 AM   Result Value Ref Range    Lactic acid 0.8 0.4 - 2.0 MMOL/L   METABOLIC PANEL, BASIC    Collection Time: 02/17/22 10:18 AM   Result Value Ref Range    Sodium 161 (HH) 138 - 145 mmol/L    Potassium 4.0 3.5 - 5.1 mmol/L    Chloride 127 (H) 98 - 107 mmol/L    CO2 27 21 - 32 mmol/L    Anion gap 7 7 - 16 mmol/L    Glucose 361 (H) 65 - 100 mg/dL    BUN 31 (H) 6 - 23 MG/DL    Creatinine 1.40 0.8 - 1.5 MG/DL    GFR est AA >60 >60 ml/min/1.73m2    GFR est non-AA 56 (L) >60 ml/min/1.73m2    Calcium 9.3 8.3 - 10.4 MG/DL   MAGNESIUM    Collection Time: 02/17/22 10:18 AM   Result Value Ref Range    Magnesium 2.4 1.8 - 2.4 mg/dL   GLUCOSE, POC    Collection Time: 02/17/22 11:54 AM   Result Value Ref Range    Glucose (POC) 403 (H) 65 - 100 mg/dL Performed by Teri    GLUCOSE, POC    Collection Time: 02/17/22  4:19 PM   Result Value Ref Range    Glucose (POC) 393 (H) 65 - 100 mg/dL    Performed by TriniERASTO    METABOLIC PANEL, BASIC    Collection Time: 02/17/22  4:32 PM   Result Value Ref Range    Sodium 160 (H) 136 - 145 mmol/L    Potassium 3.6 3.5 - 5.1 mmol/L    Chloride 127 (H) 98 - 107 mmol/L    CO2 31 21 - 32 mmol/L    Anion gap 2 (L) 7 - 16 mmol/L    Glucose 433 (H) 65 - 100 mg/dL    BUN 30 (H) 6 - 23 MG/DL    Creatinine 1.60 (H) 0.8 - 1.5 MG/DL    GFR est AA 58 (L) >60 ml/min/1.73m2    GFR est non-AA 48 (L) >60 ml/min/1.73m2    Calcium 9.3 8.3 - 10.4 MG/DL   MAGNESIUM    Collection Time: 02/17/22  4:32 PM   Result Value Ref Range    Magnesium 2.4 1.8 - 2.4 mg/dL   GLUCOSE, POC    Collection Time: 02/17/22  8:36 PM   Result Value Ref Range    Glucose (POC) 288 (H) 65 - 100 mg/dL    [facility] by [facility]    METABOLIC PANEL, BASIC    Collection Time: 02/17/22 11:21 PM   Result Value Ref Range    Sodium 162 (HH) 136 - 145 mmol/L    Potassium 3.3 (L) 3.5 - 5.1 mmol/L    Chloride 130 (H) 98 - 107 mmol/L    CO2 28 21 - 32 mmol/L    Anion gap 4 (L) 7 - 16 mmol/L    Glucose 150 (H) 65 - 100 mg/dL    BUN 28 (H) 6 - 23 MG/DL    Creatinine 1.50 0.8 - 1.5 MG/DL    GFR est AA >60 >60 ml/min/1.73m2    GFR est non-AA 52 (L) >60 ml/min/1.73m2    Calcium 9.3 8.3 - 10.4 MG/DL   MAGNESIUM    Collection Time: 02/17/22 11:21 PM   Result Value Ref Range    Magnesium 2.2 1.8 - 2.4 mg/dL   GLUCOSE, POC    Collection Time: 02/18/22  1:05 AM   Result Value Ref Range    Glucose (POC) 102 (H) 65 - 100 mg/dL    Performed by Liliana    VANCOMYCIN, RANDOM    Collection Time: 02/18/22  3:03 AM   Result Value Ref Range    Vancomycin, random 23.0 UG/ML   CBC W/O DIFF    Collection Time: 02/18/22  3:03 AM   Result Value Ref Range    WBC 13.5 (H) 4.3 - 11.1 K/uL    RBC 5.21 4.23 - 5.6 M/uL    HGB 13.4 (L) 13.6 - 17.2 g/dL    HCT 43.0 41.1 - 50.3 % MCV 82.5 79.6 - 97.8 FL    MCH 25.7 (L) 26.1 - 32.9 PG    MCHC 31.2 (L) 31.4 - 35.0 g/dL    RDW 16.9 (H) 11.9 - 14.6 %    PLATELET 631 (L) 616 - 450 K/uL    MPV 10.6 9.4 - 12.3 FL    ABSOLUTE NRBC 0.00 0.0 - 0.2 K/uL   METABOLIC PANEL, COMPREHENSIVE    Collection Time: 02/18/22  3:03 AM   Result Value Ref Range    Sodium 161 (HH) 138 - 145 mmol/L    Potassium 3.7 3.5 - 5.1 mmol/L    Chloride 130 (H) 98 - 107 mmol/L    CO2 30 21 - 32 mmol/L    Anion gap 1 (L) 7 - 16 mmol/L    Glucose 123 (H) 65 - 100 mg/dL    BUN 30 (H) 6 - 23 MG/DL    Creatinine 1.50 0.8 - 1.5 MG/DL    GFR est AA >60 >60 ml/min/1.73m2    GFR est non-AA 52 (L) >60 ml/min/1.73m2    Calcium 9.2 8.3 - 10.4 MG/DL    Bilirubin, total 0.5 0.2 - 1.1 MG/DL    ALT (SGPT) 34 12 - 65 U/L    AST (SGOT) 70 (H) 15 - 37 U/L    Alk.  phosphatase 67 50 - 136 U/L    Protein, total 6.3 6.3 - 8.2 g/dL    Albumin 3.1 (L) 3.5 - 5.0 g/dL    Globulin 3.2 2.3 - 3.5 g/dL    A-G Ratio 1.0 (L) 1.2 - 3.5     MAGNESIUM    Collection Time: 02/18/22  3:03 AM   Result Value Ref Range    Magnesium 2.3 1.8 - 2.4 mg/dL   GLUCOSE, POC    Collection Time: 02/18/22  8:51 AM   Result Value Ref Range    Glucose (POC) 304 (H) 65 - 100 mg/dL    Performed by Teri    GLUCOSE, POC    Collection Time: 02/18/22 11:47 AM   Result Value Ref Range    Glucose (POC) 234 (H) 65 - 100 mg/dL    Performed by Teri    METABOLIC PANEL, BASIC    Collection Time: 02/18/22 12:25 PM   Result Value Ref Range    Sodium 158 (H) 138 - 145 mmol/L    Potassium 3.5 3.5 - 5.1 mmol/L    Chloride 127 (H) 98 - 107 mmol/L    CO2 28 21 - 32 mmol/L    Anion gap 3 (L) 7 - 16 mmol/L    Glucose 262 (H) 65 - 100 mg/dL    BUN 28 (H) 6 - 23 MG/DL    Creatinine 1.40 0.8 - 1.5 MG/DL    GFR est AA >60 >60 ml/min/1.73m2    GFR est non-AA 56 (L) >60 ml/min/1.73m2    Calcium 8.9 8.3 - 10.4 MG/DL   MAGNESIUM    Collection Time: 02/18/22 12:25 PM   Result Value Ref Range    Magnesium 2.3 1.8 - 2.4 mg/dL GLUCOSE, POC    Collection Time: 02/18/22 12:51 PM   Result Value Ref Range    Glucose (POC) 229 (H) 65 - 100 mg/dL    Performed by Ramon    GLUCOSE, POC    Collection Time: 02/18/22  3:50 PM   Result Value Ref Range    Glucose (POC) 198 (H) 65 - 100 mg/dL    Performed by Teri        All Micro Results     Procedure Component Value Units Date/Time    CULTURE, BLOOD [[id number]] Collected: 02/16/22 1331    Order Status: Completed Specimen: Blood Updated: 02/18/22 0704     Special Requests: --        RIGHT  Antecubital       Culture result: NO GROWTH 2 DAYS       CULTURE, BLOOD [[id number]] Collected: 02/16/22 1314    Order Status: Completed Specimen: Blood Updated: 02/18/22 0704     Special Requests: --        LEFT  Antecubital       Culture result: NO GROWTH 2 DAYS       COVID-19 RAPID TEST [[id number]] Collected: 02/16/22 1314    Order Status: Completed Specimen: Nasopharyngeal Updated: 02/16/22 1350     Specimen source Nasopharyngeal        COVID-19 rapid test Not detected        Comment:      The specimen is NEGATIVE for SARS-CoV-2, the novel coronavirus associated with COVID-19. A negative result does not rule out COVID-19. This test has been authorized by the FDA under an Emergency Use Authorization (EUA) for use by authorized laboratories. Fact sheet for Healthcare Providers: ConventionUpdate.co.nz  Fact sheet for Patients: ConventionUpdate.co.nz       Methodology: Isothermal Nucleic Acid Amplification               Other Studies:  No results found.     Current Meds:  Current Facility-Administered Medications   Medication Dose Route Frequency    dexmedeTOMidine in 0.9 % NaCl (PRECEDEX) 400 mcg/100 mL (4 mcg/mL) infusion soln  0.1-1.5 mcg/kg/hr IntraVENous TITRATE    NOREPINephrine (LEVOPHED) 4 mg in 5% dextrose 250 mL infusion  0.5-16 mcg/min IntraVENous TITRATE    insulin lispro (HUMALOG) injection   SubCUTAneous Q4H    dextrose 5% infusion  100 mL/hr IntraVENous CONTINUOUS    insulin glargine (LANTUS) injection 20 Units  20 Units SubCUTAneous QHS    LORazepam (ATIVAN) injection 1 mg  1 mg IntraVENous Q2H PRN    dextrose 40% (GLUTOSE) oral gel 1 Tube  15 g Oral PRN    glucagon (GLUCAGEN) injection 1 mg  1 mg IntraMUSCular PRN    sodium chloride (NS) flush 5-40 mL  5-40 mL IntraVENous Q8H    sodium chloride (NS) flush 5-40 mL  5-40 mL IntraVENous PRN    acetaminophen (TYLENOL) tablet 650 mg  650 mg Oral Q6H PRN    Or    acetaminophen (TYLENOL) suppository 650 mg  650 mg Rectal Q6H PRN    polyethylene glycol (MIRALAX) packet 17 g  17 g Oral DAILY PRN    ondansetron (ZOFRAN ODT) tablet 4 mg  4 mg Oral Q8H PRN    Or    ondansetron (ZOFRAN) injection 4 mg  4 mg IntraVENous Q6H PRN    enoxaparin (LOVENOX) injection 40 mg  40 mg SubCUTAneous Q24H    piperacillin-tazobactam (ZOSYN) 3.375 g in 0.9% sodium chloride (MBP/ADV) 100 mL MBP  3.375 g IntraVENous Q8H    albuterol (PROVENTIL VENTOLIN) nebulizer solution 2.5 mg  2.5 mg Nebulization Q6H PRN    atorvastatin (LIPITOR) tablet 10 mg  10 mg Oral QHS    emtricitabine-tenofovir (TDF) (TRUVADA) 200-300 mg per tablet 1 Tablet  1 Tablet Oral DAILY    OLANZapine (ZyPREXA) tablet 20 mg  20 mg Oral QHS    OXcarbazepine (TRILEPTAL) tablet 300 mg  300 mg Oral Q12H    vancomycin (VANCOCIN) 1250 mg in  ml infusion  1,250 mg IntraVENous Q24H    NUTRITIONAL SUPPORT ELECTROLYTE PRN ORDERS   Does Not Apply PRN       Signed:  Archie Latham MD    Part of this note may have been written by using a voice dictation software. The note has been proof read but may still contain some grammatical/other typographical errors.

## 2022-02-18 NOTE — PROGRESS NOTES
Physical Therapy Note:    Participated in interdisciplinary rounds in ICU/CCU and chart reviewed. Patient is experiencing decrease in function from baseline. Patient would benefit from skilled acute therapy to increase independence with self care/ADLs, strength, endurance, and functional mobility. Recommend PT/OT consult when medically stable and MD agrees.     Thank you for your consideration,  Erlin Toledo, PT, DPT

## 2022-02-18 NOTE — PROGRESS NOTES
VANCO DAILY FOLLOW UP NOTE  [address] [facility] Pharmacokinetic Monitoring Service - Vancomycin    Consulting Provider: Devon   Indication: Sepsis  Target Concentration: Goal AUC/PILY 400-600 mg*hr/L  Day of Therapy: 3  Additional Antimicrobials: zosyn    Pertinent Laboratory Values: Wt Readings from Last 1 Encounters:   02/17/22 60.3 kg (133 lb)     Temp Readings from Last 1 Encounters:   02/18/22 98.8 °F (37.1 °C)     No components found for: PROCAL  Recent Labs     02/18/22  0303 02/17/22  2321 02/17/22  1632 02/17/22  1018 02/17/22  1018 02/17/22  0731 02/17/22  0315 02/16/22  2252 02/16/22  2252 02/16/22  1657 02/16/22  1654 02/16/22  1314 02/16/22  1145   BUN 30* 28* 30*   < > 31*   < > 30*   < > 30*   < >  --    < >  --    CREA 1.50 1.50 1.60*   < > 1.40   < > 1.70*   < > 1.60*   < >  --    < >  --    WBC 13.5*  --   --   --   --   --  14.9*  14.6*  --   --   --   --   --  15.1*   PCT  --   --   --   --   --   --   --   --   --   --  0.16  --   --    LAC  --   --   --   --  0.8  --  5.0*  --  3.7*   < >  --    < >  --     < > = values in this interval not displayed. Estimated Creatinine Clearance: 48.6 mL/min (based on SCr of 1.5 mg/dL). Lab Results   Component Value Date/Time    Vancomycin, random 23.0 02/18/2022 03:03 AM       MRSA Nasal Swab: N/A. Non-respiratory infection. .      Assessment:  Date/Time Dose Concentration AUC   2/18/22 @ 0303 1250 mg Q24H 23.0 532   Note: Serum concentrations collected for AUC dosing may appear elevated if collected in close proximity to the dose administered, this is not necessarily an indication of toxicity    Plan:  Dosing recommendations based on Bayesian software  Continue current dose of 1250 mg Q24H as regimen is therapeutic.    Renal labs as indicated   Vancomycin concentration ordered for 2/21 @ 0400  Pharmacy will continue to monitor patient and adjust therapy as indicated    Thank you for the consult,  Ana CarrilloD, BCPS  Clinical Pharmacist  Contact via miiCard

## 2022-02-18 NOTE — PROGRESS NOTES
Spiritual Care Visit, initial visit. Attempted to visit with patient at bedside. Found him sleeping soundly; left a card for family. Visit by Maile Zacarias, Staff .  Jose Manuel, Nuzhat.B., B.A.

## 2022-02-19 LAB
ALBUMIN SERPL-MCNC: 2.5 G/DL (ref 3.5–5)
ALBUMIN/GLOB SERPL: 0.7 {RATIO} (ref 1.2–3.5)
ALP SERPL-CCNC: 59 U/L (ref 50–136)
ALT SERPL-CCNC: 57 U/L (ref 12–65)
ANION GAP SERPL CALC-SCNC: 3 MMOL/L (ref 7–16)
ANION GAP SERPL CALC-SCNC: 3 MMOL/L (ref 7–16)
ANION GAP SERPL CALC-SCNC: ABNORMAL MMOL/L (ref 7–16)
AST SERPL-CCNC: 87 U/L (ref 15–37)
BILIRUB SERPL-MCNC: 0.7 MG/DL (ref 0.2–1.1)
BUN SERPL-MCNC: 21 MG/DL (ref 6–23)
BUN SERPL-MCNC: 23 MG/DL (ref 6–23)
BUN SERPL-MCNC: 23 MG/DL (ref 6–23)
CALCIUM SERPL-MCNC: 8.6 MG/DL (ref 8.3–10.4)
CALCIUM SERPL-MCNC: 8.7 MG/DL (ref 8.3–10.4)
CALCIUM SERPL-MCNC: 8.7 MG/DL (ref 8.3–10.4)
CHLORIDE SERPL-SCNC: 123 MMOL/L (ref 98–107)
CHLORIDE SERPL-SCNC: 124 MMOL/L (ref 98–107)
CHLORIDE SERPL-SCNC: 124 MMOL/L (ref 98–107)
CO2 SERPL-SCNC: 27 MMOL/L (ref 21–32)
CO2 SERPL-SCNC: 28 MMOL/L (ref 21–32)
CO2 SERPL-SCNC: 29 MMOL/L (ref 21–32)
CREAT SERPL-MCNC: 1 MG/DL (ref 0.8–1.5)
CREAT SERPL-MCNC: 1.2 MG/DL (ref 0.8–1.5)
CREAT SERPL-MCNC: 1.2 MG/DL (ref 0.8–1.5)
ERYTHROCYTE [DISTWIDTH] IN BLOOD BY AUTOMATED COUNT: 16.1 % (ref 11.9–14.6)
GLOBULIN SER CALC-MCNC: 3.4 G/DL (ref 2.3–3.5)
GLUCOSE BLD STRIP.AUTO-MCNC: 136 MG/DL (ref 65–100)
GLUCOSE BLD STRIP.AUTO-MCNC: 192 MG/DL (ref 65–100)
GLUCOSE BLD STRIP.AUTO-MCNC: 210 MG/DL (ref 65–100)
GLUCOSE BLD STRIP.AUTO-MCNC: 251 MG/DL (ref 65–100)
GLUCOSE SERPL-MCNC: 150 MG/DL (ref 65–100)
GLUCOSE SERPL-MCNC: 270 MG/DL (ref 65–100)
GLUCOSE SERPL-MCNC: 291 MG/DL (ref 65–100)
HCT VFR BLD AUTO: 39.1 % (ref 41.1–50.3)
HGB BLD-MCNC: 12 G/DL (ref 13.6–17.2)
MAGNESIUM SERPL-MCNC: 2.1 MG/DL (ref 1.8–2.4)
MAGNESIUM SERPL-MCNC: 2.2 MG/DL (ref 1.8–2.4)
MAGNESIUM SERPL-MCNC: 2.2 MG/DL (ref 1.8–2.4)
MCH RBC QN AUTO: 25.3 PG (ref 26.1–32.9)
MCHC RBC AUTO-ENTMCNC: 30.7 G/DL (ref 31.4–35)
MCV RBC AUTO: 82.5 FL (ref 79.6–97.8)
NRBC # BLD: 0 K/UL (ref 0–0.2)
PLATELET # BLD AUTO: 94 K/UL (ref 150–450)
PMV BLD AUTO: 10.6 FL (ref 9.4–12.3)
POTASSIUM SERPL-SCNC: 3.3 MMOL/L (ref 3.5–5.1)
POTASSIUM SERPL-SCNC: 3.3 MMOL/L (ref 3.5–5.1)
POTASSIUM SERPL-SCNC: 3.5 MMOL/L (ref 3.5–5.1)
PROT SERPL-MCNC: 5.9 G/DL (ref 6.3–8.2)
RBC # BLD AUTO: 4.74 M/UL (ref 4.23–5.6)
SERVICE CMNT-IMP: ABNORMAL
SODIUM SERPL-SCNC: 151 MMOL/L (ref 138–145)
SODIUM SERPL-SCNC: 154 MMOL/L (ref 138–145)
SODIUM SERPL-SCNC: 154 MMOL/L (ref 138–145)
WBC # BLD AUTO: 10.9 K/UL (ref 4.3–11.1)

## 2022-02-19 PROCEDURE — 85027 COMPLETE CBC AUTOMATED: CPT

## 2022-02-19 PROCEDURE — 80053 COMPREHEN METABOLIC PANEL: CPT

## 2022-02-19 PROCEDURE — 83735 ASSAY OF MAGNESIUM: CPT

## 2022-02-19 PROCEDURE — 74011250636 HC RX REV CODE- 250/636: Performed by: STUDENT IN AN ORGANIZED HEALTH CARE EDUCATION/TRAINING PROGRAM

## 2022-02-19 PROCEDURE — 74011000250 HC RX REV CODE- 250: Performed by: INTERNAL MEDICINE

## 2022-02-19 PROCEDURE — 82962 GLUCOSE BLOOD TEST: CPT

## 2022-02-19 PROCEDURE — 74011636637 HC RX REV CODE- 636/637: Performed by: STUDENT IN AN ORGANIZED HEALTH CARE EDUCATION/TRAINING PROGRAM

## 2022-02-19 PROCEDURE — 74011250636 HC RX REV CODE- 250/636: Performed by: INTERNAL MEDICINE

## 2022-02-19 PROCEDURE — 74011000258 HC RX REV CODE- 258: Performed by: INTERNAL MEDICINE

## 2022-02-19 PROCEDURE — 80048 BASIC METABOLIC PNL TOTAL CA: CPT

## 2022-02-19 PROCEDURE — 74011250637 HC RX REV CODE- 250/637: Performed by: INTERNAL MEDICINE

## 2022-02-19 PROCEDURE — 36415 COLL VENOUS BLD VENIPUNCTURE: CPT

## 2022-02-19 PROCEDURE — 74011250636 HC RX REV CODE- 250/636: Performed by: HOSPITALIST

## 2022-02-19 PROCEDURE — 65610000006 HC RM INTENSIVE CARE

## 2022-02-19 PROCEDURE — 74011250637 HC RX REV CODE- 250/637: Performed by: HOSPITALIST

## 2022-02-19 PROCEDURE — 74011250636 HC RX REV CODE- 250/636: Performed by: FAMILY MEDICINE

## 2022-02-19 PROCEDURE — 74011000250 HC RX REV CODE- 250: Performed by: FAMILY MEDICINE

## 2022-02-19 RX ORDER — OLANZAPINE 5 MG/1
20 TABLET, ORALLY DISINTEGRATING ORAL
Status: DISCONTINUED | OUTPATIENT
Start: 2022-02-19 | End: 2022-02-23 | Stop reason: HOSPADM

## 2022-02-19 RX ORDER — POTASSIUM CHLORIDE 14.9 MG/ML
20 INJECTION INTRAVENOUS
Status: COMPLETED | OUTPATIENT
Start: 2022-02-19 | End: 2022-02-19

## 2022-02-19 RX ADMIN — OXCARBAZEPINE 300 MG: 300 TABLET, FILM COATED ORAL at 09:00

## 2022-02-19 RX ADMIN — Medication 9 UNITS: at 05:02

## 2022-02-19 RX ADMIN — Medication 6 UNITS: at 20:52

## 2022-02-19 RX ADMIN — ENOXAPARIN SODIUM 40 MG: 100 INJECTION SUBCUTANEOUS at 16:40

## 2022-02-19 RX ADMIN — DEXMEDETOMIDINE HYDROCHLORIDE 0.9 MCG/KG/HR: 400 INJECTION INTRAVENOUS at 03:15

## 2022-02-19 RX ADMIN — POTASSIUM CHLORIDE 20 MEQ: 14.9 INJECTION, SOLUTION INTRAVENOUS at 05:02

## 2022-02-19 RX ADMIN — LORAZEPAM 1 MG: 2 INJECTION INTRAMUSCULAR; INTRAVENOUS at 11:05

## 2022-02-19 RX ADMIN — INSULIN GLARGINE 20 UNITS: 100 INJECTION, SOLUTION SUBCUTANEOUS at 21:45

## 2022-02-19 RX ADMIN — DEXMEDETOMIDINE HYDROCHLORIDE 0.6 MCG/KG/HR: 400 INJECTION INTRAVENOUS at 05:40

## 2022-02-19 RX ADMIN — SODIUM CHLORIDE, PRESERVATIVE FREE 10 ML: 5 INJECTION INTRAVENOUS at 13:34

## 2022-02-19 RX ADMIN — PIPERACILLIN SODIUM,TAZOBACTAM SODIUM 3.38 G: 3; .375 INJECTION, POWDER, FOR SOLUTION INTRAVENOUS at 01:14

## 2022-02-19 RX ADMIN — PIPERACILLIN SODIUM,TAZOBACTAM SODIUM 3.38 G: 3; .375 INJECTION, POWDER, FOR SOLUTION INTRAVENOUS at 08:48

## 2022-02-19 RX ADMIN — DEXMEDETOMIDINE HYDROCHLORIDE 1 MCG/KG/HR: 400 INJECTION INTRAVENOUS at 14:53

## 2022-02-19 RX ADMIN — LORAZEPAM 1 MG: 2 INJECTION INTRAMUSCULAR; INTRAVENOUS at 20:25

## 2022-02-19 RX ADMIN — SODIUM CHLORIDE, PRESERVATIVE FREE 10 ML: 5 INJECTION INTRAVENOUS at 22:25

## 2022-02-19 RX ADMIN — POTASSIUM CHLORIDE 20 MEQ: 14.9 INJECTION, SOLUTION INTRAVENOUS at 03:00

## 2022-02-19 RX ADMIN — OXCARBAZEPINE 300 MG: 300 TABLET, FILM COATED ORAL at 20:25

## 2022-02-19 RX ADMIN — SODIUM CHLORIDE, PRESERVATIVE FREE 10 ML: 5 INJECTION INTRAVENOUS at 05:03

## 2022-02-19 RX ADMIN — LORAZEPAM 1 MG: 2 INJECTION INTRAMUSCULAR; INTRAVENOUS at 05:40

## 2022-02-19 RX ADMIN — Medication 9 UNITS: at 13:46

## 2022-02-19 RX ADMIN — PIPERACILLIN SODIUM,TAZOBACTAM SODIUM 3.38 G: 3; .375 INJECTION, POWDER, FOR SOLUTION INTRAVENOUS at 16:39

## 2022-02-19 RX ADMIN — LORAZEPAM 1 MG: 2 INJECTION INTRAMUSCULAR; INTRAVENOUS at 02:33

## 2022-02-19 RX ADMIN — LORAZEPAM 1 MG: 2 INJECTION INTRAMUSCULAR; INTRAVENOUS at 13:20

## 2022-02-19 RX ADMIN — Medication 3 UNITS: at 16:59

## 2022-02-19 RX ADMIN — OLANZAPINE 20 MG: 5 TABLET, ORALLY DISINTEGRATING ORAL at 22:25

## 2022-02-19 RX ADMIN — DEXTROSE MONOHYDRATE 100 ML/HR: 5 INJECTION, SOLUTION INTRAVENOUS at 00:37

## 2022-02-19 RX ADMIN — DEXTROSE MONOHYDRATE 100 ML/HR: 5 INJECTION, SOLUTION INTRAVENOUS at 10:37

## 2022-02-19 RX ADMIN — LORAZEPAM 1 MG: 2 INJECTION INTRAMUSCULAR; INTRAVENOUS at 09:07

## 2022-02-19 RX ADMIN — LORAZEPAM 1 MG: 2 INJECTION INTRAMUSCULAR; INTRAVENOUS at 23:22

## 2022-02-19 RX ADMIN — VANCOMYCIN HYDROCHLORIDE 1250 MG: 10 INJECTION, POWDER, LYOPHILIZED, FOR SOLUTION INTRAVENOUS at 19:46

## 2022-02-19 RX ADMIN — Medication 9 UNITS: at 02:03

## 2022-02-19 RX ADMIN — SODIUM CHLORIDE, PRESERVATIVE FREE 10 ML: 5 INJECTION INTRAVENOUS at 05:41

## 2022-02-19 NOTE — PROGRESS NOTES
OT orders received, chart reviewed, and evaluation attempted. Per RN, pt is not appropriate for therapy today. Will follow up as schedule/ pt's status allows.     Chino Owens, OT

## 2022-02-19 NOTE — PROGRESS NOTES
PT orders received, chart reviewed, and evaluation attempted. Per RN, pt is not appropriate for therapy this afternoon. PT Will follow up as schedule/ pt's status allows. Sandoval Ramos, PT, DPT, OCS.

## 2022-02-19 NOTE — PROGRESS NOTES
Bedside and verbal shift change report received from  Elbert Ruiz [facility] (offgoing nurse). Report included the following information SBAR, Kardex, Procedure Summary, Intake/Output, MAR and Recent Results.      Dual skin assessment completed at bedside: wnl (list pertinent skin assessment findings)    Dual verification of gtts completed (name of gtts verified): n/a

## 2022-02-19 NOTE — PROGRESS NOTES
Bedside and verbal shift change report received from  LIBBY Frances (offgoing nurse). Report included the following information SBAR, Kardex, ED Summary, Procedure Summary, Intake/Output, MAR, Recent Results, Cardiac Rhythm SB and Alarm Parameters .

## 2022-02-19 NOTE — PROGRESS NOTES
VANCO DAILY FOLLOW UP NOTE  [address] [facility] Pharmacokinetic Monitoring Service - Vancomycin    Consulting Provider: Dr. Tia Peres   Indication: sepsis of unknown etiology  Target Concentration: Goal AUC/PILY 400-600 mg*hr/L  Day of Therapy: 4  Additional Antimicrobials: pip/tazo    Pertinent Laboratory Values: Wt Readings from Last 1 Encounters:   02/17/22 60.3 kg (133 lb)     Temp Readings from Last 1 Encounters:   02/19/22 97.6 °F (36.4 °C)     No components found for: PROCAL  Recent Labs     02/19/22  0253 02/19/22  0101 02/18/22  1908 02/18/22  1225 02/18/22  0303 02/17/22  1632 02/17/22  1018 02/17/22  0731 02/17/22  0315 02/16/22  2252 02/16/22  2252 02/16/22  1657 02/16/22  1654 02/16/22  1314 02/16/22  1145   BUN 23 23 24*   < > 30*   < > 31*   < > 30*   < > 30*   < >  --    < >  --    CREA 1.20 1.20 1.30   < > 1.50   < > 1.40   < > 1.70*   < > 1.60*   < >  --    < >  --    WBC  --   --   --   --  13.5*  --   --   --  14.9*  14.6*  --   --   --   --   --  15.1*   PCT  --   --   --   --   --   --   --   --   --   --   --   --  0.16  --   --    LAC  --   --   --   --   --   --  0.8  --  5.0*  --  3.7*   < >  --    < >  --     < > = values in this interval not displayed. Estimated Creatinine Clearance: 60.7 mL/min (based on SCr of 1.2 mg/dL). Lab Results   Component Value Date/Time    Vancomycin, random 23.0 02/18/2022 03:03 AM     MRSA Nasal Swab: N/A. Non-respiratory infection. .  Assessment:  Date/Time Dose Concentration AUC   2/18 0303 1250 mg q24h 23.0 532   Note: Serum concentrations collected for AUC dosing may appear elevated if collected in close proximity to the dose administered, this is not necessarily an indication of toxicity    Plan:  Current dosing regimen is therapeutic  Continue current dose  Repeat vancomycin concentration ordered as clinically indicated  Pharmacy will continue to monitor patient and adjust therapy as indicated    Thank you for the consult,  Melissa Hinds Saida España

## 2022-02-19 NOTE — PROGRESS NOTES
Hospitalist Progress Note   Admit Date:  2022 12:25 PM   Name:  Sachi Ledezma   Age:  46 y.o. Sex:  male  :  1970   MRN:  [id number]   Room:  [facility]/    Presenting Complaint: Vomiting    Reason(s) for Admission: DKA (diabetic ketoacidosis) ([facility] [E11.10]  Sepsis [facility]) [A41.9]     Hospital Course & Interval History:   61-year-old male with a past medical history of hypertension, HIV, schizophrenia, insulin-dependent diabetes mellitus, presented in the setting of altered mental status. Most of the history obtained by brother at bedside since patient unable to provide any details. According to the brother and caregiver he has been presenting with worsening mentation for the last few days, the got worse over time so they decided to bring him to the emergency department. Otherwise they have not seen any coughing or complaining of chest pain also no evidence of abdominal pain nausea vomiting or diarrhea. In the emergency department the patient was found to be tachycardic, with leukocytosis and a blood sugar of 1410. He was a started on insulin drip. He was given IV fluids and IV antibiotics. Subjective/24hr Events (22): Patient was seen and examined at the bedside. Continues to be altered. ROS:  10 systems reviewed and negative except as noted above.      Assessment & Plan:   DKA (diabetic ketoacidosis) ([facility] (2022)  Initially started on insulin drip with glucose stabilizer; currently off insulin drip due to resolution of anion gap  Continue to monitor blood glucose  Continue to monitor electrolytes closely and replete accordingly  A1c greater than 16  Diabetic educator pending  Currently on Lantus 10 units, sliding scale insulin  Patient's blood sugars have been very labile    Concern for sepsis(tachycardia, leukocytosis) of unclear etiology  Continue IV fluids  Continue Vanco and Zosyn  Pro-Richi 0.16  UA negative  Lactic acid 1 8  Chest x-ray without pneumonia  Wound culture positive for light staph aureus    Acute metabolic encephalopathy  CT of the brain without any acute intracranial abnormalities  Patient has a history of schizophrenia not taking his medications  Continue to monitor mental status  Avoid sedatives  Delirium precautions  Psychiatry consulted  Continue Precedex    Hypernatremia  Was initially on D5 half-normal due to hypernatremia  Fluid started to D5W since sodium still at 161  2/19 sodium coming down currently 151  Follow-up morning BMP    Hyperlipidemia  Continue home medication    Hypertension  Hypertensives on hold at this point  Central line placed due to hypotension    Schizophrenia  We will continue patient's home medications  Apparently patient was noncompliant with his home medications, currently in four-point restraints with sedation  Psychiatry consulted, appreciate recommendations      Dispo/Discharge Planning:    Dispo pending clinical course    Diet:  DIET NPO  DVT PPx: Lovenox  Code status: Full Code    Hospital Problems as of 2/19/2022 Never Reviewed          Codes Class Noted - Resolved POA    * (Principal) DKA (diabetic ketoacidosis) ([facility] ICD-10-CM: E11.10  ICD-9-CM: 250.12  2/16/2022 - Present Unknown        Sepsis ([facility] ICD-10-CM: A41.9  ICD-9-CM: 038.9, 995.91  2/16/2022 - Present Unknown        Schizophrenia ([facility] ICD-10-CM: F20.9  ICD-9-CM: 295.90  3/15/2020 - Present Yes        HIV (human immunodeficiency virus infection) ([facility] ICD-10-CM: B20  ICD-9-CM: V08  6/4/2014 - Present Yes              Objective:     Patient Vitals for the past 24 hrs:   Temp Pulse Resp BP SpO2   02/19/22 1345  (!) 48  (!) 103/56 100 %   02/19/22 1330  (!) 50  (!) 107/56 100 %   02/19/22 1315  (!) 48  (!) 92/52 100 %   02/19/22 1300  (!) 51  (!) 95/58 100 %   02/19/22 1245  (!) 50  (!) 95/51 99 %   02/19/22 1230  (!) 48  (!) 93/53 99 %   02/19/22 1215  (!) 48  (!) 90/50 100 %   02/19/22 1200  (!) 49  (!) 99/53 99 %   02/19/22 1145  (!) 48  (!) 95/50 99 %   02/19/22 1130  (!) 50  (!) 93/51 100 %   02/19/22 1115  (!) 52  (!) 102/57 99 %   02/19/22 1100 97.8 °F (36.6 °C) (!) 47 15 93/66 100 %   02/19/22 1045  (!) 46  (!) 96/53 100 %   02/19/22 1030  (!) 46  (!) 99/58 100 %   02/19/22 1015  (!) 46  (!) 101/56 100 %   02/19/22 1000  (!) 47  (!) 106/59 100 %   02/19/22 0930  (!) 50  (!) 87/54 99 %   02/19/22 0915  (!) 52   100 %   02/19/22 0900  (!) 52  (!) 96/59 97 %   02/19/22 0845  (!) 50   99 %   02/19/22 0830  (!) 52  (!) 84/55 100 %   02/19/22 0815  (!) 46   100 %   02/19/22 0805  (!) 48  (!) 89/52 100 %   02/19/22 0800  (!) 48  (!) 89/52 100 %   02/19/22 0730  (!) 46  (!) 94/53 100 %   02/19/22 0715  (!) 46   100 %   02/19/22 0700 97.6 °F (36.4 °C) (!) 47 16 (!) 90/50 100 %   02/19/22 0400  (!) 53  (!) 100/58 99 %   02/19/22 0345  (!) 53   100 %   02/19/22 0330  (!) 52  (!) 89/55 100 %   02/19/22 0315  (!) 46   100 %   02/19/22 0300 99.4 °F (37.4 °C) (!) 47 20 (!) 92/52 100 %   02/19/22 0245  (!) 46   99 %   02/19/22 0230  (!) 48  (!) 110/58 99 %   02/19/22 0215  (!) 50   100 %   02/19/22 0200  (!) 52  (!) 106/59 99 %   02/19/22 0145  (!) 50   100 %   02/19/22 0130  (!) 48  (!) 110/57 100 %   02/19/22 0115  (!) 50  (!) 107/57 100 %   02/19/22 0100  (!) 50  114/63 100 %   02/19/22 0045  (!) 51   100 %   02/19/22 0030  (!) 52  (!) 113/57 99 %   02/19/22 0015  (!) 51   100 %   02/19/22 0000  (!) 51  115/65 100 %   02/18/22 2345  (!) 51   100 %   02/18/22 2330  (!) 51  109/61 100 %   02/18/22 2315  (!) 51   100 %   02/18/22 2300 (!) 101.9 °F (38.8 °C) (!) 51 22 108/60 100 %   02/18/22 2245  (!) 52   100 %   02/18/22 2230 (!) 100.9 °F (38.3 °C) (!) 52  109/61 100 %   02/18/22 2215  (!) 53   100 %   02/18/22 2200  (!) 53  122/66 100 %   02/18/22 2153 (!) 102.4 °F (39.1 °C)       02/18/22 2145  (!) 55   97 %   02/18/22 2130  (!) 55  139/70 100 %   02/18/22 2115  (!) 55   100 %   02/18/22 2100  (!) 55  139/69 100 %   02/18/22 1900 99 °F (37.2 °C) (!) 56 22 110/61 100 %   02/18/22 1816  (!) 54   100 %   02/18/22 1801  (!) 54  123/64 100 %   02/18/22 1746  (!) 54   100 %   02/18/22 1731  (!) 54  121/63 100 %   02/18/22 1716  (!) 58   100 %   02/18/22 1701  (!) 56  (!) 99/56 93 %   02/18/22 1646  (!) 56   94 %   02/18/22 1631  (!) 55  125/63 98 %   02/18/22 1616  (!) 55   99 %   02/18/22 1601  (!) 160  (!) 101/58 98 %   02/18/22 1546  (!) 55   99 %   02/18/22 1531  (!) 54  106/60 99 %   02/18/22 1516  (!) 54   100 %   02/18/22 1501 98.2 °F (36.8 °C) (!) 53  (!) 105/59 100 %     Oxygen Therapy  O2 Sat (%): 100 % (02/19/22 1345)  Pulse via Oximetry: 49 beats per minute (02/19/22 1345)  O2 Device: Nasal cannula (02/19/22 0700)  Skin Assessment: Clean, dry, & intact (02/18/22 1900)  Skin Protection for O2 Device: N/A (02/18/22 1900)  O2 Flow Rate (L/min): 3 l/min (02/19/22 0700)    Estimated body mass index is 22.13 kg/m² as calculated from the following:    Height as of 2/13/22: 5' 5\" (1.651 m). Weight as of this encounter: 60.3 kg (133 lb). Intake/Output Summary (Last 24 hours) at 2/19/2022 1456  Last data filed at 2/19/2022 0930  Gross per 24 hour   Intake 1410 ml   Output 1150 ml   Net 260 ml         Physical Exam:   Blood pressure (!) 103/56, pulse (!) 48, temperature 97.8 °F (36.6 °C), resp. rate 15, weight 60.3 kg (133 lb), SpO2 100 %. General:    Sedated  Head:  Normocephalic, atraumatic  Eyes:  Sclerae appear normal.  Pupils equally round. ENT:  Nares appear normal, no drainage. Moist oral mucosa  Neck:  No restricted ROM. Trachea midline   CV:   RRR. No m/r/g. No jugular venous distension. Lungs:   CTAB. No wheezing, rhonchi, or rales. Respirations even, unlabored  Abdomen: Bowel sounds present. Soft, nontender, nondistended. Extremities: No cyanosis or clubbing.   No edema  Skin:     No rashes and normal coloration. Warm and dry. Neuro:  CN II-XII grossly intact. Sensation intact.   A&Ox3  Psych:  Able to evaluate due to sedation    I have reviewed ordered lab tests and independently visualized imaging below:    Recent Labs:  Recent Results (from the past 48 hour(s))   GLUCOSE, POC    Collection Time: 02/17/22  4:19 PM   Result Value Ref Range    Glucose (POC) 393 (H) 65 - 100 mg/dL    Performed by Teri    METABOLIC PANEL, BASIC    Collection Time: 02/17/22  4:32 PM   Result Value Ref Range    Sodium 160 (H) 136 - 145 mmol/L    Potassium 3.6 3.5 - 5.1 mmol/L    Chloride 127 (H) 98 - 107 mmol/L    CO2 31 21 - 32 mmol/L    Anion gap 2 (L) 7 - 16 mmol/L    Glucose 433 (H) 65 - 100 mg/dL    BUN 30 (H) 6 - 23 MG/DL    Creatinine 1.60 (H) 0.8 - 1.5 MG/DL    GFR est AA 58 (L) >60 ml/min/1.73m2    GFR est non-AA 48 (L) >60 ml/min/1.73m2    Calcium 9.3 8.3 - 10.4 MG/DL   MAGNESIUM    Collection Time: 02/17/22  4:32 PM   Result Value Ref Range    Magnesium 2.4 1.8 - 2.4 mg/dL   GLUCOSE, POC    Collection Time: 02/17/22  8:36 PM   Result Value Ref Range    Glucose (POC) 288 (H) 65 - 100 mg/dL    Performed by [facility]    METABOLIC PANEL, BASIC    Collection Time: 02/17/22 11:21 PM   Result Value Ref Range    Sodium 162 (HH) 136 - 145 mmol/L    Potassium 3.3 (L) 3.5 - 5.1 mmol/L    Chloride 130 (H) 98 - 107 mmol/L    CO2 28 21 - 32 mmol/L    Anion gap 4 (L) 7 - 16 mmol/L    Glucose 150 (H) 65 - 100 mg/dL    BUN 28 (H) 6 - 23 MG/DL    Creatinine 1.50 0.8 - 1.5 MG/DL    GFR est AA >60 >60 ml/min/1.73m2    GFR est non-AA 52 (L) >60 ml/min/1.73m2    Calcium 9.3 8.3 - 10.4 MG/DL   MAGNESIUM    Collection Time: 02/17/22 11:21 PM   Result Value Ref Range    Magnesium 2.2 1.8 - 2.4 mg/dL   GLUCOSE, POC    Collection Time: 02/18/22  1:05 AM   Result Value Ref Range    Glucose (POC) 102 (H) 65 - 100 mg/dL    Performed by Liliana CLAROS, RANDOM    Collection Time: 02/18/22  3:03 AM   Result Value Ref Range    Vancomycin, random 23.0 UG/ML   CBC W/O DIFF    Collection Time: 02/18/22  3:03 AM   Result Value Ref Range    WBC 13.5 (H) 4.3 - 11.1 K/uL    RBC 5.21 4.23 - 5.6 M/uL    HGB 13.4 (L) 13.6 - 17.2 g/dL    HCT 43.0 41.1 - 50.3 %    MCV 82.5 79.6 - 97.8 FL    MCH 25.7 (L) 26.1 - 32.9 PG    MCHC 31.2 (L) 31.4 - 35.0 g/dL    RDW 16.9 (H) 11.9 - 14.6 %    PLATELET 329 (L) 764 - 450 K/uL    MPV 10.6 9.4 - 12.3 FL    ABSOLUTE NRBC 0.00 0.0 - 0.2 K/uL   METABOLIC PANEL, COMPREHENSIVE    Collection Time: 02/18/22  3:03 AM   Result Value Ref Range    Sodium 161 (HH) 138 - 145 mmol/L    Potassium 3.7 3.5 - 5.1 mmol/L    Chloride 130 (H) 98 - 107 mmol/L    CO2 30 21 - 32 mmol/L    Anion gap 1 (L) 7 - 16 mmol/L    Glucose 123 (H) 65 - 100 mg/dL    BUN 30 (H) 6 - 23 MG/DL    Creatinine 1.50 0.8 - 1.5 MG/DL    GFR est AA >60 >60 ml/min/1.73m2    GFR est non-AA 52 (L) >60 ml/min/1.73m2    Calcium 9.2 8.3 - 10.4 MG/DL    Bilirubin, total 0.5 0.2 - 1.1 MG/DL    ALT (SGPT) 34 12 - 65 U/L    AST (SGOT) 70 (H) 15 - 37 U/L    Alk.  phosphatase 67 50 - 136 U/L    Protein, total 6.3 6.3 - 8.2 g/dL    Albumin 3.1 (L) 3.5 - 5.0 g/dL    Globulin 3.2 2.3 - 3.5 g/dL    A-G Ratio 1.0 (L) 1.2 - 3.5     MAGNESIUM    Collection Time: 02/18/22  3:03 AM   Result Value Ref Range    Magnesium 2.3 1.8 - 2.4 mg/dL   GLUCOSE, POC    Collection Time: 02/18/22  8:51 AM   Result Value Ref Range    Glucose (POC) 304 (H) 65 - 100 mg/dL    Performed by Teri    GLUCOSE, POC    Collection Time: 02/18/22 11:47 AM   Result Value Ref Range    Glucose (POC) 234 (H) 65 - 100 mg/dL    Performed by Teri    METABOLIC PANEL, BASIC    Collection Time: 02/18/22 12:25 PM   Result Value Ref Range    Sodium 158 (H) 138 - 145 mmol/L    Potassium 3.5 3.5 - 5.1 mmol/L    Chloride 127 (H) 98 - 107 mmol/L    CO2 28 21 - 32 mmol/L    Anion gap 3 (L) 7 - 16 mmol/L    Glucose 262 (H) 65 - 100 mg/dL    BUN 28 (H) 6 - 23 MG/DL    Creatinine 1.40 0.8 - 1.5 MG/DL    GFR est AA >60 >60 ml/min/1.73m2    GFR est non-AA 56 (L) >60 ml/min/1.73m2    Calcium 8.9 8.3 - 10.4 MG/DL   MAGNESIUM    Collection Time: 02/18/22 12:25 PM   Result Value Ref Range    Magnesium 2.3 1.8 - 2.4 mg/dL   GLUCOSE, POC    Collection Time: 02/18/22 12:51 PM   Result Value Ref Range    Glucose (POC) 229 (H) 65 - 100 mg/dL    Performed by Ramon    GLUCOSE, POC    Collection Time: 02/18/22  3:50 PM   Result Value Ref Range    Glucose (POC) 198 (H) 65 - 100 mg/dL    Performed by Teri    METABOLIC PANEL, BASIC    Collection Time: 02/18/22  3:55 PM   Result Value Ref Range    Sodium 158 (H) 136 - 145 mmol/L    Potassium 3.4 (L) 3.5 - 5.1 mmol/L    Chloride 126 (H) 98 - 107 mmol/L    CO2 28 21 - 32 mmol/L    Anion gap 4 (L) 7 - 16 mmol/L    Glucose 226 (H) 65 - 100 mg/dL    BUN 26 (H) 6 - 23 MG/DL    Creatinine 1.30 0.8 - 1.5 MG/DL    GFR est AA >60 >60 ml/min/1.73m2    GFR est non-AA >60 >60 ml/min/1.73m2    Calcium 8.9 8.3 - 10.4 MG/DL   MAGNESIUM    Collection Time: 02/18/22  3:55 PM   Result Value Ref Range    Magnesium 2.3 1.8 - 2.4 mg/dL   METABOLIC PANEL, BASIC    Collection Time: 02/18/22  7:08 PM   Result Value Ref Range    Sodium 156 (H) 136 - 145 mmol/L    Potassium 3.4 (L) 3.5 - 5.1 mmol/L    Chloride 125 (H) 98 - 107 mmol/L    CO2 28 21 - 32 mmol/L    Anion gap 3 (L) 7 - 16 mmol/L    Glucose 286 (H) 65 - 100 mg/dL    BUN 24 (H) 6 - 23 MG/DL    Creatinine 1.30 0.8 - 1.5 MG/DL    GFR est AA >60 >60 ml/min/1.73m2    GFR est non-AA >60 >60 ml/min/1.73m2    Calcium 8.7 8.3 - 10.4 MG/DL   MAGNESIUM    Collection Time: 02/18/22  7:08 PM   Result Value Ref Range    Magnesium 2.1 1.8 - 2.4 mg/dL   METABOLIC PANEL, BASIC    Collection Time: 02/19/22  1:01 AM   Result Value Ref Range    Sodium 154 (H) 138 - 145 mmol/L    Potassium 3.3 (L) 3.5 - 5.1 mmol/L    Chloride 124 (H) 98 - 107 mmol/L    CO2 27 21 - 32 mmol/L    Anion gap 3 (L) 7 - 16 mmol/L    Glucose 291 (H) 65 - 100 mg/dL    BUN 23 6 - 23 MG/DL    Creatinine 1.20 0.8 - 1.5 MG/DL    GFR est AA >60 >60 ml/min/1.73m2    GFR est non-AA >60 >60 ml/min/1.73m2    Calcium 8.7 8.3 - 10.4 MG/DL   MAGNESIUM    Collection Time: 02/19/22  1:01 AM   Result Value Ref Range    Magnesium 2.2 1.8 - 2.4 mg/dL   METABOLIC PANEL, COMPREHENSIVE    Collection Time: 02/19/22  2:53 AM   Result Value Ref Range    Sodium 154 (H) 138 - 145 mmol/L    Potassium 3.3 (L) 3.5 - 5.1 mmol/L    Chloride 123 (H) 98 - 107 mmol/L    CO2 28 21 - 32 mmol/L    Anion gap 3 (L) 7 - 16 mmol/L    Glucose 270 (H) 65 - 100 mg/dL    BUN 23 6 - 23 MG/DL    Creatinine 1.20 0.8 - 1.5 MG/DL    GFR est AA >60 >60 ml/min/1.73m2    GFR est non-AA >60 >60 ml/min/1.73m2    Calcium 8.6 8.3 - 10.4 MG/DL    Bilirubin, total 0.7 0.2 - 1.1 MG/DL    ALT (SGPT) 57 12 - 65 U/L    AST (SGOT) 87 (H) 15 - 37 U/L    Alk.  phosphatase 59 50 - 136 U/L    Protein, total 5.9 (L) 6.3 - 8.2 g/dL    Albumin 2.5 (L) 3.5 - 5.0 g/dL    Globulin 3.4 2.3 - 3.5 g/dL    A-G Ratio 0.7 (L) 1.2 - 3.5     MAGNESIUM    Collection Time: 02/19/22  2:53 AM   Result Value Ref Range    Magnesium 2.1 1.8 - 2.4 mg/dL   METABOLIC PANEL, BASIC    Collection Time: 02/19/22  8:01 AM   Result Value Ref Range    Sodium 151 (H) 138 - 145 mmol/L    Potassium 3.5 3.5 - 5.1 mmol/L    Chloride 124 (H) 98 - 107 mmol/L    CO2 29 21 - 32 mmol/L    Anion gap Cannot be calculated 7 - 16 mmol/L    Glucose 150 (H) 65 - 100 mg/dL    BUN 21 6 - 23 MG/DL    Creatinine 1.00 0.8 - 1.5 MG/DL    GFR est AA >60 >60 ml/min/1.73m2    GFR est non-AA >60 >60 ml/min/1.73m2    Calcium 8.7 8.3 - 10.4 MG/DL   MAGNESIUM    Collection Time: 02/19/22  8:01 AM   Result Value Ref Range    Magnesium 2.2 1.8 - 2.4 mg/dL   CBC W/O DIFF    Collection Time: 02/19/22  8:01 AM   Result Value Ref Range    WBC 10.9 4.3 - 11.1 K/uL    RBC 4.74 4.23 - 5.6 M/uL    HGB 12.0 (L) 13.6 - 17.2 g/dL    HCT 39.1 (L) 41.1 - 50.3 %    MCV 82.5 79.6 - 97.8 FL    MCH 25.3 (L) 26.1 - 32.9 PG    MCHC 30.7 (L) 31.4 - 35.0 g/dL    RDW 16.1 (H) 11.9 - 14.6 %    PLATELET 94 (L) 717 - 450 K/uL    MPV 10.6 9.4 - 12.3 FL    ABSOLUTE NRBC 0.00 0.0 - 0.2 K/uL   GLUCOSE, POC    Collection Time: 02/19/22  8:45 AM   Result Value Ref Range    Glucose (POC) 136 (H) 65 - 100 mg/dL    Performed by StandardJoshua[facility]BSN    GLUCOSE, POC    Collection Time: 02/19/22  1:32 PM   Result Value Ref Range    Glucose (POC) 251 (H) 65 - 100 mg/dL    Performed by StandardJoshuaRNBSN        All Micro Results     Procedure Component Value Units Date/Time    CULTURE, BLOOD [[id number]] Collected: 02/16/22 1314    Order Status: Completed Specimen: Blood Updated: 02/19/22 0800     Special Requests: --        LEFT  Antecubital       Culture result: NO GROWTH 3 DAYS       CULTURE, BLOOD [[id number]] Collected: 02/16/22 1331    Order Status: Completed Specimen: Blood Updated: 02/19/22 0800     Special Requests: --        RIGHT  Antecubital       Culture result: NO GROWTH 3 DAYS       COVID-19 RAPID TEST [[id number]] Collected: 02/16/22 1314    Order Status: Completed Specimen: Nasopharyngeal Updated: 02/16/22 1350     Specimen source Nasopharyngeal        COVID-19 rapid test Not detected        Comment:      The specimen is NEGATIVE for SARS-CoV-2, the novel coronavirus associated with COVID-19. A negative result does not rule out COVID-19. This test has been authorized by the FDA under an Emergency Use Authorization (EUA) for use by authorized laboratories. Fact sheet for Healthcare Providers: ConventionUpdate.co.nz  Fact sheet for Patients: ConventionUpdate.co.nz       Methodology: Isothermal Nucleic Acid Amplification               Other Studies:  No results found.     Current Meds:  Current Facility-Administered Medications   Medication Dose Route Frequency    dexmedeTOMidine in 0.9 % NaCl (PRECEDEX) 400 mcg/100 mL (4 mcg/mL) infusion soln 0.1-1.5 mcg/kg/hr IntraVENous TITRATE    NOREPINephrine (LEVOPHED) 4 mg in 5% dextrose 250 mL infusion  0.5-16 mcg/min IntraVENous TITRATE    insulin lispro (HUMALOG) injection   SubCUTAneous Q4H    dextrose 5% infusion  100 mL/hr IntraVENous CONTINUOUS    insulin glargine (LANTUS) injection 20 Units  20 Units SubCUTAneous QHS    LORazepam (ATIVAN) injection 1 mg  1 mg IntraVENous Q2H PRN    dextrose 40% (GLUTOSE) oral gel 1 Tube  15 g Oral PRN    glucagon (GLUCAGEN) injection 1 mg  1 mg IntraMUSCular PRN    sodium chloride (NS) flush 5-40 mL  5-40 mL IntraVENous Q8H    sodium chloride (NS) flush 5-40 mL  5-40 mL IntraVENous PRN    acetaminophen (TYLENOL) tablet 650 mg  650 mg Oral Q6H PRN    Or    acetaminophen (TYLENOL) suppository 650 mg  650 mg Rectal Q6H PRN    polyethylene glycol (MIRALAX) packet 17 g  17 g Oral DAILY PRN    ondansetron (ZOFRAN ODT) tablet 4 mg  4 mg Oral Q8H PRN    Or    ondansetron (ZOFRAN) injection 4 mg  4 mg IntraVENous Q6H PRN    enoxaparin (LOVENOX) injection 40 mg  40 mg SubCUTAneous Q24H    piperacillin-tazobactam (ZOSYN) 3.375 g in 0.9% sodium chloride (MBP/ADV) 100 mL MBP  3.375 g IntraVENous Q8H    albuterol (PROVENTIL VENTOLIN) nebulizer solution 2.5 mg  2.5 mg Nebulization Q6H PRN    atorvastatin (LIPITOR) tablet 10 mg  10 mg Oral QHS    emtricitabine-tenofovir (TDF) (TRUVADA) 200-300 mg per tablet 1 Tablet  1 Tablet Oral DAILY    OLANZapine (ZyPREXA) tablet 20 mg  20 mg Oral QHS    OXcarbazepine (TRILEPTAL) tablet 300 mg  300 mg Oral Q12H    vancomycin (VANCOCIN) 1250 mg in  ml infusion  1,250 mg IntraVENous Q24H    NUTRITIONAL SUPPORT ELECTROLYTE PRN ORDERS   Does Not Apply PRN       Signed:  Kelsi Barbour MD    Part of this note may have been written by using a voice dictation software. The note has been proof read but may still contain some grammatical/other typographical errors.

## 2022-02-20 LAB
ALBUMIN SERPL-MCNC: 2.3 G/DL (ref 3.5–5)
ALBUMIN/GLOB SERPL: 0.7 {RATIO} (ref 1.2–3.5)
ALP SERPL-CCNC: 54 U/L (ref 50–136)
ALT SERPL-CCNC: 48 U/L (ref 12–65)
ANION GAP SERPL CALC-SCNC: 5 MMOL/L (ref 7–16)
AST SERPL-CCNC: 52 U/L (ref 15–37)
BILIRUB SERPL-MCNC: 0.6 MG/DL (ref 0.2–1.1)
BUN SERPL-MCNC: 15 MG/DL (ref 6–23)
CALCIUM SERPL-MCNC: 8 MG/DL (ref 8.3–10.4)
CHLORIDE SERPL-SCNC: 117 MMOL/L (ref 98–107)
CO2 SERPL-SCNC: 26 MMOL/L (ref 21–32)
CREAT SERPL-MCNC: 0.7 MG/DL (ref 0.8–1.5)
ERYTHROCYTE [DISTWIDTH] IN BLOOD BY AUTOMATED COUNT: 15.6 % (ref 11.9–14.6)
GLOBULIN SER CALC-MCNC: 3.1 G/DL (ref 2.3–3.5)
GLUCOSE BLD STRIP.AUTO-MCNC: 184 MG/DL (ref 65–100)
GLUCOSE BLD STRIP.AUTO-MCNC: 202 MG/DL (ref 65–100)
GLUCOSE BLD STRIP.AUTO-MCNC: 206 MG/DL (ref 65–100)
GLUCOSE BLD STRIP.AUTO-MCNC: 208 MG/DL (ref 65–100)
GLUCOSE BLD STRIP.AUTO-MCNC: 219 MG/DL (ref 65–100)
GLUCOSE SERPL-MCNC: 197 MG/DL (ref 65–100)
HCT VFR BLD AUTO: 36.4 % (ref 41.1–50.3)
HGB BLD-MCNC: 11.4 G/DL (ref 13.6–17.2)
MAGNESIUM SERPL-MCNC: 1.9 MG/DL (ref 1.8–2.4)
MCH RBC QN AUTO: 25.7 PG (ref 26.1–32.9)
MCHC RBC AUTO-ENTMCNC: 31.3 G/DL (ref 31.4–35)
MCV RBC AUTO: 82.2 FL (ref 79.6–97.8)
NRBC # BLD: 0 K/UL (ref 0–0.2)
PLATELET # BLD AUTO: 80 K/UL (ref 150–450)
PMV BLD AUTO: 10.9 FL (ref 9.4–12.3)
POTASSIUM SERPL-SCNC: 2.8 MMOL/L (ref 3.5–5.1)
POTASSIUM SERPL-SCNC: 3.5 MMOL/L (ref 3.5–5.1)
PROT SERPL-MCNC: 5.4 G/DL (ref 6.3–8.2)
RBC # BLD AUTO: 4.43 M/UL (ref 4.23–5.6)
SERVICE CMNT-IMP: ABNORMAL
SODIUM SERPL-SCNC: 148 MMOL/L (ref 136–145)
WBC # BLD AUTO: 7.2 K/UL (ref 4.3–11.1)

## 2022-02-20 PROCEDURE — 97112 NEUROMUSCULAR REEDUCATION: CPT

## 2022-02-20 PROCEDURE — 74011000250 HC RX REV CODE- 250: Performed by: INTERNAL MEDICINE

## 2022-02-20 PROCEDURE — 74011000258 HC RX REV CODE- 258: Performed by: INTERNAL MEDICINE

## 2022-02-20 PROCEDURE — 97530 THERAPEUTIC ACTIVITIES: CPT

## 2022-02-20 PROCEDURE — 97161 PT EVAL LOW COMPLEX 20 MIN: CPT

## 2022-02-20 PROCEDURE — 97165 OT EVAL LOW COMPLEX 30 MIN: CPT

## 2022-02-20 PROCEDURE — 74011250636 HC RX REV CODE- 250/636: Performed by: FAMILY MEDICINE

## 2022-02-20 PROCEDURE — 74011250637 HC RX REV CODE- 250/637: Performed by: INTERNAL MEDICINE

## 2022-02-20 PROCEDURE — 97535 SELF CARE MNGMENT TRAINING: CPT

## 2022-02-20 PROCEDURE — 74011636637 HC RX REV CODE- 636/637: Performed by: STUDENT IN AN ORGANIZED HEALTH CARE EDUCATION/TRAINING PROGRAM

## 2022-02-20 PROCEDURE — 84132 ASSAY OF SERUM POTASSIUM: CPT

## 2022-02-20 PROCEDURE — 80053 COMPREHEN METABOLIC PANEL: CPT

## 2022-02-20 PROCEDURE — 85027 COMPLETE CBC AUTOMATED: CPT

## 2022-02-20 PROCEDURE — 74011250636 HC RX REV CODE- 250/636: Performed by: HOSPITALIST

## 2022-02-20 PROCEDURE — 74011250636 HC RX REV CODE- 250/636: Performed by: STUDENT IN AN ORGANIZED HEALTH CARE EDUCATION/TRAINING PROGRAM

## 2022-02-20 PROCEDURE — 74011250636 HC RX REV CODE- 250/636: Performed by: INTERNAL MEDICINE

## 2022-02-20 PROCEDURE — 2709999900 HC NON-CHARGEABLE SUPPLY

## 2022-02-20 PROCEDURE — 65660000000 HC RM CCU STEPDOWN

## 2022-02-20 PROCEDURE — 83735 ASSAY OF MAGNESIUM: CPT

## 2022-02-20 PROCEDURE — 82962 GLUCOSE BLOOD TEST: CPT

## 2022-02-20 RX ORDER — POTASSIUM CHLORIDE 29.8 MG/ML
40 INJECTION INTRAVENOUS EVERY 4 HOURS
Status: COMPLETED | OUTPATIENT
Start: 2022-02-20 | End: 2022-02-20

## 2022-02-20 RX ADMIN — ENOXAPARIN SODIUM 40 MG: 100 INJECTION SUBCUTANEOUS at 16:33

## 2022-02-20 RX ADMIN — POTASSIUM CHLORIDE 40 MEQ: 400 INJECTION, SOLUTION INTRAVENOUS at 04:39

## 2022-02-20 RX ADMIN — DEXTROSE MONOHYDRATE 100 ML/HR: 5 INJECTION, SOLUTION INTRAVENOUS at 15:49

## 2022-02-20 RX ADMIN — POTASSIUM CHLORIDE 40 MEQ: 400 INJECTION, SOLUTION INTRAVENOUS at 08:55

## 2022-02-20 RX ADMIN — LORAZEPAM 1 MG: 2 INJECTION INTRAMUSCULAR; INTRAVENOUS at 03:56

## 2022-02-20 RX ADMIN — Medication 6 UNITS: at 16:33

## 2022-02-20 RX ADMIN — Medication 6 UNITS: at 00:48

## 2022-02-20 RX ADMIN — EMTRICITABINE AND TENOFOVIR DISOPROXIL FUMARATE 1 TABLET: 200; 300 TABLET, FILM COATED ORAL at 09:00

## 2022-02-20 RX ADMIN — SODIUM CHLORIDE, PRESERVATIVE FREE 10 ML: 5 INJECTION INTRAVENOUS at 05:00

## 2022-02-20 RX ADMIN — Medication 6 UNITS: at 21:51

## 2022-02-20 RX ADMIN — PIPERACILLIN SODIUM,TAZOBACTAM SODIUM 3.38 G: 3; .375 INJECTION, POWDER, FOR SOLUTION INTRAVENOUS at 08:31

## 2022-02-20 RX ADMIN — INSULIN GLARGINE 20 UNITS: 100 INJECTION, SOLUTION SUBCUTANEOUS at 21:50

## 2022-02-20 RX ADMIN — SODIUM CHLORIDE, PRESERVATIVE FREE 10 ML: 5 INJECTION INTRAVENOUS at 14:07

## 2022-02-20 RX ADMIN — Medication 3 UNITS: at 08:31

## 2022-02-20 RX ADMIN — LORAZEPAM 1 MG: 2 INJECTION INTRAMUSCULAR; INTRAVENOUS at 01:19

## 2022-02-20 RX ADMIN — OXCARBAZEPINE 300 MG: 300 TABLET, FILM COATED ORAL at 08:32

## 2022-02-20 RX ADMIN — Medication 6 UNITS: at 14:06

## 2022-02-20 RX ADMIN — DEXTROSE MONOHYDRATE 100 ML/HR: 5 INJECTION, SOLUTION INTRAVENOUS at 06:11

## 2022-02-20 RX ADMIN — PIPERACILLIN SODIUM,TAZOBACTAM SODIUM 3.38 G: 3; .375 INJECTION, POWDER, FOR SOLUTION INTRAVENOUS at 00:02

## 2022-02-20 RX ADMIN — SODIUM CHLORIDE, PRESERVATIVE FREE 10 ML: 5 INJECTION INTRAVENOUS at 21:50

## 2022-02-20 RX ADMIN — Medication 3 UNITS: at 04:16

## 2022-02-20 NOTE — PROGRESS NOTES
Bedside and verbal shift change report received from  [address] (offgoing nurse). Report included the following information SBAR, Kardex, Intake/Output, MAR and Recent Results.      Dual skin assessment completed at bedside: WNL (list pertinent skin assessment findings)    Dual verification of gtts completed (name of gtts verified): n/a

## 2022-02-20 NOTE — PROGRESS NOTES
TRANSFER - IN REPORT:    Verbal report received from [address], RN(name) on Catarino Garcia  being received from ICU overflow  (unit) for routine progression of care      Report consisted of patients Situation, Background, Assessment and   Recommendations(SBAR). Information from the following report(s) SBAR and ED Summary was reviewed with the receiving nurse. Opportunity for questions and clarification was provided. Assessment to be completed upon patients arrival to unit and care assumed.

## 2022-02-20 NOTE — PROGRESS NOTES
Bedside and verbal shift change report received from  LIBBY Frances (offgoing nurse). Report included the following information SBAR, Kardex, ED Summary, Procedure Summary, Intake/Output, MAR, Recent Results, Cardiac Rhythm SR and Alarm Parameters . Dual skin assessment completed at bedside.

## 2022-02-20 NOTE — PROGRESS NOTES
02/20/22 1547   Dual Skin Pressure Injury Assessment   Dual Skin Pressure Injury Assessment WDL   Second Care Provider (Based on [address] [place] [address]) Mike Rojas, LIBBY   No skin breakdown noted.  Allevyn in place

## 2022-02-20 NOTE — PROGRESS NOTES
END OF SHIFT NOTE:    INTAKE/OUTPUT  02/19 0701 - 02/20 0700  In: 5435.8 [I.V.:5435.8]  Out: 300 [Urine:300]  Voiding: YES  Catheter: external cath    Drain:   External Urinary Catheter 02/19/22 (Active)   Site Assessment Clean, dry, & intact 02/20/22 1547   Repositioned No 02/20/22 1547   Perineal Care No 02/20/22 1547   Wick Changed Yes 02/20/22 0530   Suction Canister/Tubing Changed Yes 02/19/22 1704   Urine Output (mL) 1400 ml 02/20/22 1516               Flatus: Patient does have flatus present. Stool:  0 occurrences. Characteristics:  Stool Assessment  Stool Color: Brown  Stool Appearance: Loose,Soft  Stool Amount: Large  Stool Source/Status: Rectum    Emesis: 0 occurrences.     Characteristics:        VITAL SIGNS  Patient Vitals for the past 12 hrs:   Temp Pulse Resp BP SpO2   02/20/22 1550 98.5 °F (36.9 °C) 86 16 122/78 97 %   02/20/22 1547  85      02/20/22 1515  80  122/72 97 %   02/20/22 1500 98.4 °F (36.9 °C) 80  116/67 97 %   02/20/22 1445  80  135/82 (!) 88 %   02/20/22 1430  83  120/79 98 %   02/20/22 1415  81  114/74 98 %   02/20/22 1400  85  121/69 99 %   02/20/22 1345  82  120/72 99 %   02/20/22 1330  84  116/66 97 %   02/20/22 1315  85  119/71 98 %   02/20/22 1300  83  109/66 97 %   02/20/22 1245  84  117/67 97 %   02/20/22 1230  88  125/70 98 %   02/20/22 1215  82  108/67 99 %   02/20/22 1200  84  113/65 99 %   02/20/22 1145  86  111/62 99 %   02/20/22 1130  89  137/78 98 %   02/20/22 1115  90  122/72 98 %   02/20/22 1100 98.7 °F (37.1 °C) 88 15 123/71 96 %   02/20/22 1045  87  115/64 98 %   02/20/22 1030  (!) 101  124/71 98 %   02/20/22 1015  85  105/69 97 %   02/20/22 1000  83  118/80 99 %   02/20/22 0945  83  119/76 100 %   02/20/22 0930  82  119/80 100 %   02/20/22 0915  81  118/82 100 %   02/20/22 0900  84  119/77 99 %   02/20/22 0845  79  126/77 100 %   02/20/22 0830  74  127/76 100 %   02/20/22 0815  72  130/74 100 %   02/20/22 0800  74  125/73 100 %   02/20/22 0745  72  125/71 100 %   02/20/22 0730  69  130/69 100 %   02/20/22 0715  75  131/70 99 %   02/20/22 0700 97.4 °F (36.3 °C) 73 16 120/74 100 %       Pain Assessment  Pain Intensity 1: 0 (02/20/22 1547)        Patient Stated Pain Goal: 0    Ambulating  No    Shift report given to oncoming nurse at the bedside.     Hayes Beauchamp RN

## 2022-02-20 NOTE — PROGRESS NOTES
Hospitalist Progress Note   Admit Date:  2022 12:25 PM   Name:  Valarie Lombard   Age:  46 y.o. Sex:  male  :  1970   MRN:  [id number]   Room:      Presenting Complaint: Vomiting    Reason(s) for Admission: DKA (diabetic ketoacidosis) ([facility] [E11.10]  Sepsis [facility]) [A41.9]     Hospital Course & Interval History:   49-year-old male with a past medical history of hypertension, HIV, schizophrenia, insulin-dependent diabetes mellitus, presented in the setting of altered mental status. Most of the history obtained by brother at bedside since patient unable to provide any details. According to the brother and caregiver he has been presenting with worsening mentation for the last few days, the got worse over time so they decided to bring him to the emergency department. Otherwise they have not seen any coughing or complaining of chest pain also no evidence of abdominal pain nausea vomiting or diarrhea. In the emergency department the patient was found to be tachycardic, with leukocytosis and a blood sugar of 1410. He was a started on insulin drip. He was given IV fluids and IV antibiotics. Subjective/24hr Events (22): Patient was seen and examined at the bedside. Patient off of Levophed and Precedex. Patient more alert and awake this morning. Patient will be transferred out of the ICU.    ROS:  10 systems reviewed and negative except as noted above.      Assessment & Plan:   DKA (diabetic ketoacidosis) ([facility] (2022)  Initially started on insulin drip with glucose stabilizer; currently off insulin drip due to resolution of anion gap  Continue to monitor blood glucose  Continue to monitor electrolytes closely and replete accordingly  A1c greater than 16  Diabetic educator pending  Currently on Lantus 10 units, sliding scale insulin  Patient's blood sugars have been very labile    Concern for sepsis(tachycardia, leukocytosis) of unclear etiology  Continue IV fluids  Continue Vanco and Zosyn  Pro-Richi 0.16  UA negative  Lactic acid 1 8  Chest x-ray without pneumonia  Wound culture positive for light staph aureus    Acute metabolic encephalopathy  CT of the brain without any acute intracranial abnormalities  Patient has a history of schizophrenia not taking his medications  Continue to monitor mental status  Avoid sedatives  Delirium precautions  Psychiatry consulted  Currently off Precedex, continue to monitor    Hypernatremia  Was initially on D5 half-normal due to hypernatremia  Fluid started to D5W since sodium still at 161  2/19 sodium coming down currently 151  Follow-up morning BMP    Hyperlipidemia  Continue home medication    Hypertension  Hypertensives on hold at this point  Central line placed due to hypotension    Schizophrenia  We will continue patient's home medications  Apparently patient was noncompliant with his home medications, currently in four-point restraints with sedation  Psychiatry consulted, appreciate recommendations      Dispo/Discharge Planning:    Dispo pending clinical course    Diet:  DIET NPO  DVT PPx: Lovenox  Code status: Full Code    Hospital Problems as of 2/20/2022 Never Reviewed          Codes Class Noted - Resolved POA    * (Principal) DKA (diabetic ketoacidosis) ([facility] ICD-10-CM: E11.10  ICD-9-CM: 250.12  2/16/2022 - Present Unknown        Sepsis ([facility] ICD-10-CM: A41.9  ICD-9-CM: 038.9, 995.91  2/16/2022 - Present Unknown        Schizophrenia ([facility] ICD-10-CM: F20.9  ICD-9-CM: 295.90  3/15/2020 - Present Yes        HIV (human immunodeficiency virus infection) ([facility] ICD-10-CM: B20  ICD-9-CM: V08  6/4/2014 - Present Yes              Objective:     Patient Vitals for the past 24 hrs:   Temp Pulse Resp BP SpO2   02/20/22 1550  86  122/78 97 %   02/20/22 1547  85      02/20/22 1515  80  122/72 97 %   02/20/22 1500 98.4 °F (36.9 °C) 80  116/67 97 %   02/20/22 1445  80  135/82 (!) 88 %   02/20/22 1430  83  120/79 98 %   02/20/22 1415  81  114/74 98 %   02/20/22 1400  85  121/69 99 %   02/20/22 1345  82  120/72 99 %   02/20/22 1330  84  116/66 97 %   02/20/22 1315  85  119/71 98 %   02/20/22 1300  83  109/66 97 %   02/20/22 1245  84  117/67 97 %   02/20/22 1230  88  125/70 98 %   02/20/22 1215  82  108/67 99 %   02/20/22 1200  84  113/65 99 %   02/20/22 1145  86  111/62 99 %   02/20/22 1130  89  137/78 98 %   02/20/22 1115  90  122/72 98 %   02/20/22 1100 98.7 °F (37.1 °C) 88 15 123/71 96 %   02/20/22 1045  87  115/64 98 %   02/20/22 1030  (!) 101  124/71 98 %   02/20/22 1015  85  105/69 97 %   02/20/22 1000  83  118/80 99 %   02/20/22 0945  83  119/76 100 %   02/20/22 0930  82  119/80 100 %   02/20/22 0915  81  118/82 100 %   02/20/22 0900  84  119/77 99 %   02/20/22 0845  79  126/77 100 %   02/20/22 0830  74  127/76 100 %   02/20/22 0815  72  130/74 100 %   02/20/22 0800  74  125/73 100 %   02/20/22 0745  72  125/71 100 %   02/20/22 0730  69  130/69 100 %   02/20/22 0715  75  131/70 99 %   02/20/22 0700 97.4 °F (36.3 °C) 73 16 120/74 100 %   02/20/22 0615  69  120/75 100 %   02/20/22 0600  72  117/71 100 %   02/20/22 0545  68  113/64 100 %   02/20/22 0530  68  114/67 100 %   02/20/22 0515  74  (!) 117/59 (!) 88 %   02/20/22 0500  76  118/68 100 %   02/20/22 0445  64  117/68 100 %   02/20/22 0430  61  119/72 99 %   02/20/22 0415  65  114/64 98 %   02/20/22 0400  63  118/66 100 %   02/20/22 0345  67  123/62 100 %   02/20/22 0330  63  103/63 100 %   02/20/22 0315  63  126/68 100 %   02/20/22 0300 97.6 °F (36.4 °C) (!) 58 20 (!) 98/56 100 %   02/20/22 0245  (!) 54  (!) 94/58 100 %   02/20/22 0230  63  102/60 100 %   02/20/22 0215  61  (!) 96/57 100 %   02/20/22 0200  (!) 53  (!) 97/52 100 %   02/20/22 0145  64  108/62 100 %   02/20/22 0130  (!) 58  108/61 99 %   02/20/22 0115  (!) 53  (!) 103/59 100 %   02/20/22 0100  (!) 54  (!) 94/54 100 %   02/20/22 0045  (!) 46  (!) 96/55 99 %   02/20/22 0030  (!) 44  105/62 100 %   02/20/22 0015  (!) 47  (!) 104/55 100 %   02/20/22 0000  (!) 47  106/60 100 %   02/19/22 2345  (!) 44  (!) 99/56 100 %   02/19/22 2330  (!) 45  (!) 105/58 100 %   02/19/22 2315  (!) 43  (!) 84/53 100 %   02/19/22 2300 97.8 °F (36.6 °C) (!) 44 18 (!) 90/55 100 %   02/19/22 2245  (!) 44   96 %   02/19/22 2230  (!) 42  (!) 98/56 100 %   02/19/22 2215  (!) 42  (!) 87/54 100 %   02/19/22 2200  (!) 41  (!) 97/55 100 %   02/19/22 2145  (!) 39  121/60 100 %   02/19/22 2130  (!) 41   100 %   02/19/22 2115  (!) 43   100 %   02/19/22 2100  (!) 45   100 %   02/19/22 2045  (!) 44  106/61 100 %   02/19/22 2030  (!) 43   100 %   02/19/22 2015  (!) 42  (!) 102/55 100 %   02/19/22 2000  (!) 41  (!) 107/55 100 %   02/19/22 1945  (!) 42  (!) 109/56 99 %   02/19/22 1930  (!) 42  109/63 100 %   02/19/22 1915  (!) 42  110/61 100 %   02/19/22 1900 98.5 °F (36.9 °C) (!) 42 20 110/68 100 %   02/19/22 1815  (!) 40  (!) 90/51 97 %   02/19/22 1800  (!) 41  (!) 98/57 97 %   02/19/22 1745  (!) 41   99 %   02/19/22 1730  (!) 42   100 %   02/19/22 1715  (!) 42  (!) 94/57 100 %   02/19/22 1700  (!) 42  (!) 106/56 99 %   02/19/22 1645  (!) 41  95/73 100 %   02/19/22 1630  (!) 42   99 %   02/19/22 1615  (!) 43  (!) 89/53 97 %   02/19/22 1600  (!) 42  (!) 88/55 100 %     Oxygen Therapy  O2 Sat (%): 97 % (02/20/22 1550)  Pulse via Oximetry: 81 beats per minute (02/20/22 1515)  O2 Device: None (Room air) (02/20/22 1100)  Skin Assessment: Clean, dry, & intact (02/19/22 1900)  Skin Protection for O2 Device: N/A (02/19/22 1900)  O2 Flow Rate (L/min): 3 l/min (02/20/22 0700)    Estimated body mass index is 22.13 kg/m² as calculated from the following:    Height as of 2/13/22: 5' 5\" (1.651 m). Weight as of this encounter: 60.3 kg (133 lb).     Intake/Output Summary (Last 24 hours) at 2/20/2022 1558  Last data filed at 2/20/2022 1516  Gross per 24 hour   Intake 5435.76 ml   Output 1400 ml   Net 4035.76 ml         Physical Exam:   Blood pressure 122/78, pulse 86, temperature 98.4 °F (36.9 °C), resp. rate 15, weight 60.3 kg (133 lb), SpO2 97 %. General:    Not in any acute distress  Head:  Normocephalic, atraumatic  Eyes:  Sclerae appear normal.  Pupils equally round. ENT:  Nares appear normal, no drainage. Moist oral mucosa  Neck:  No restricted ROM. Trachea midline   CV:   RRR. No m/r/g. No jugular venous distension. Lungs:   CTAB. No wheezing, rhonchi, or rales. Respirations even, unlabored  Abdomen: Bowel sounds present. Soft, nontender, nondistended. Extremities: No cyanosis or clubbing. No edema  Skin:     No rashes and normal coloration. Warm and dry. Neuro:  CN II-XII grossly intact. Sensation intact.    Psych:  Awake alert    I have reviewed ordered lab tests and independently visualized imaging below:    Recent Labs:  Recent Results (from the past 48 hour(s))   METABOLIC PANEL, BASIC    Collection Time: 02/18/22  7:08 PM   Result Value Ref Range    Sodium 156 (H) 136 - 145 mmol/L    Potassium 3.4 (L) 3.5 - 5.1 mmol/L    Chloride 125 (H) 98 - 107 mmol/L    CO2 28 21 - 32 mmol/L    Anion gap 3 (L) 7 - 16 mmol/L    Glucose 286 (H) 65 - 100 mg/dL    BUN 24 (H) 6 - 23 MG/DL    Creatinine 1.30 0.8 - 1.5 MG/DL    GFR est AA >60 >60 ml/min/1.73m2    GFR est non-AA >60 >60 ml/min/1.73m2    Calcium 8.7 8.3 - 10.4 MG/DL   MAGNESIUM    Collection Time: 02/18/22  7:08 PM   Result Value Ref Range    Magnesium 2.1 1.8 - 2.4 mg/dL   METABOLIC PANEL, BASIC    Collection Time: 02/19/22  1:01 AM   Result Value Ref Range    Sodium 154 (H) 138 - 145 mmol/L    Potassium 3.3 (L) 3.5 - 5.1 mmol/L    Chloride 124 (H) 98 - 107 mmol/L    CO2 27 21 - 32 mmol/L    Anion gap 3 (L) 7 - 16 mmol/L    Glucose 291 (H) 65 - 100 mg/dL    BUN 23 6 - 23 MG/DL    Creatinine 1.20 0.8 - 1.5 MG/DL    GFR est AA >60 >60 ml/min/1.73m2    GFR est non-AA >60 >60 ml/min/1.73m2    Calcium 8.7 8.3 - 10.4 MG/DL   MAGNESIUM    Collection Time: 02/19/22  1:01 AM   Result Value Ref Range    Magnesium 2.2 1.8 - 2.4 mg/dL   METABOLIC PANEL, COMPREHENSIVE    Collection Time: 02/19/22  2:53 AM   Result Value Ref Range    Sodium 154 (H) 138 - 145 mmol/L    Potassium 3.3 (L) 3.5 - 5.1 mmol/L    Chloride 123 (H) 98 - 107 mmol/L    CO2 28 21 - 32 mmol/L    Anion gap 3 (L) 7 - 16 mmol/L    Glucose 270 (H) 65 - 100 mg/dL    BUN 23 6 - 23 MG/DL    Creatinine 1.20 0.8 - 1.5 MG/DL    GFR est AA >60 >60 ml/min/1.73m2    GFR est non-AA >60 >60 ml/min/1.73m2    Calcium 8.6 8.3 - 10.4 MG/DL    Bilirubin, total 0.7 0.2 - 1.1 MG/DL    ALT (SGPT) 57 12 - 65 U/L    AST (SGOT) 87 (H) 15 - 37 U/L    Alk.  phosphatase 59 50 - 136 U/L    Protein, total 5.9 (L) 6.3 - 8.2 g/dL    Albumin 2.5 (L) 3.5 - 5.0 g/dL    Globulin 3.4 2.3 - 3.5 g/dL    A-G Ratio 0.7 (L) 1.2 - 3.5     MAGNESIUM    Collection Time: 02/19/22  2:53 AM   Result Value Ref Range    Magnesium 2.1 1.8 - 2.4 mg/dL   METABOLIC PANEL, BASIC    Collection Time: 02/19/22  8:01 AM   Result Value Ref Range    Sodium 151 (H) 138 - 145 mmol/L    Potassium 3.5 3.5 - 5.1 mmol/L    Chloride 124 (H) 98 - 107 mmol/L    CO2 29 21 - 32 mmol/L    Anion gap Cannot be calculated 7 - 16 mmol/L    Glucose 150 (H) 65 - 100 mg/dL    BUN 21 6 - 23 MG/DL    Creatinine 1.00 0.8 - 1.5 MG/DL    GFR est AA >60 >60 ml/min/1.73m2    GFR est non-AA >60 >60 ml/min/1.73m2    Calcium 8.7 8.3 - 10.4 MG/DL   MAGNESIUM    Collection Time: 02/19/22  8:01 AM   Result Value Ref Range    Magnesium 2.2 1.8 - 2.4 mg/dL   CBC W/O DIFF    Collection Time: 02/19/22  8:01 AM   Result Value Ref Range    WBC 10.9 4.3 - 11.1 K/uL    RBC 4.74 4.23 - 5.6 M/uL    HGB 12.0 (L) 13.6 - 17.2 g/dL    HCT 39.1 (L) 41.1 - 50.3 %    MCV 82.5 79.6 - 97.8 FL    MCH 25.3 (L) 26.1 - 32.9 PG    MCHC 30.7 (L) 31.4 - 35.0 g/dL    RDW 16.1 (H) 11.9 - 14.6 % PLATELET 94 (L) 565 - 450 K/uL    MPV 10.6 9.4 - 12.3 FL    ABSOLUTE NRBC 0.00 0.0 - 0.2 K/uL   GLUCOSE, POC    Collection Time: 02/19/22  8:45 AM   Result Value Ref Range    Glucose (POC) 136 (H) 65 - 100 mg/dL    Performed by [facility]    GLUCOSE, POC    Collection Time: 02/19/22  1:32 PM   Result Value Ref Range    Glucose (POC) 251 (H) 65 - 100 mg/dL    Performed by [facility]    GLUCOSE, POC    Collection Time: 02/19/22  4:52 PM   Result Value Ref Range    Glucose (POC) 192 (H) 65 - 100 mg/dL    Performed by [facility]    GLUCOSE, POC    Collection Time: 02/19/22  8:49 PM   Result Value Ref Range    Glucose (POC) 210 (H) 65 - 100 mg/dL    Performed by [facility]    GLUCOSE, POC    Collection Time: 02/20/22 12:47 AM   Result Value Ref Range    Glucose (POC) 219 (H) 65 - 100 mg/dL    Performed by [facility]    CBC W/O DIFF    Collection Time: 02/20/22  2:54 AM   Result Value Ref Range    WBC 7.2 4.3 - 11.1 K/uL    RBC 4.43 4.23 - 5.6 M/uL    HGB 11.4 (L) 13.6 - 17.2 g/dL    HCT 36.4 (L) 41.1 - 50.3 %    MCV 82.2 79.6 - 97.8 FL    MCH 25.7 (L) 26.1 - 32.9 PG    MCHC 31.3 (L) 31.4 - 35.0 g/dL    RDW 15.6 (H) 11.9 - 14.6 %    PLATELET 80 (L) 595 - 450 K/uL    MPV 10.9 9.4 - 12.3 FL    ABSOLUTE NRBC 0.00 0.0 - 0.2 K/uL   METABOLIC PANEL, COMPREHENSIVE    Collection Time: 02/20/22  2:54 AM   Result Value Ref Range    Sodium 148 (H) 136 - 145 mmol/L    Potassium 2.8 (L) 3.5 - 5.1 mmol/L    Chloride 117 (H) 98 - 107 mmol/L    CO2 26 21 - 32 mmol/L    Anion gap 5 (L) 7 - 16 mmol/L    Glucose 197 (H) 65 - 100 mg/dL    BUN 15 6 - 23 MG/DL    Creatinine 0.70 (L) 0.8 - 1.5 MG/DL    GFR est AA >60 >60 ml/min/1.73m2    GFR est non-AA >60 >60 ml/min/1.73m2    Calcium 8.0 (L) 8.3 - 10.4 MG/DL    Bilirubin, total 0.6 0.2 - 1.1 MG/DL    ALT (SGPT) 48 12 - 65 U/L    AST (SGOT) 52 (H) 15 - 37 U/L    Alk.  phosphatase 54 50 - 136 U/L    Protein, total 5.4 (L) 6.3 - 8.2 g/dL    Albumin 2.3 (L) 3.5 - 5.0 g/dL    Globulin 3.1 2.3 - 3.5 g/dL    A-G Ratio 0.7 (L) 1.2 - 3.5     MAGNESIUM    Collection Time: 02/20/22  2:54 AM   Result Value Ref Range    Magnesium 1.9 1.8 - 2.4 mg/dL   GLUCOSE, POC    Collection Time: 02/20/22  8:20 AM   Result Value Ref Range    Glucose (POC) 184 (H) 65 - 100 mg/dL    Performed by [facility]    GLUCOSE, POC    Collection Time: 02/20/22  1:45 PM   Result Value Ref Range    Glucose (POC) 206 (H) 65 - 100 mg/dL    Performed by [facility]BS[facility]    POTASSIUM    Collection Time: 02/20/22  2:49 PM   Result Value Ref Range    Potassium 3.5 3.5 - 5.1 mmol/L       All Micro Results     Procedure Component Value Units Date/Time    CULTURE, BLOOD [[id number]] Collected: 02/16/22 1314    Order Status: Completed Specimen: Blood Updated: 02/20/22 1206     Special Requests: --        LEFT  Antecubital       Culture result: NO GROWTH 4 DAYS       CULTURE, BLOOD [[id number]] Collected: 02/16/22 1331    Order Status: Completed Specimen: Blood Updated: 02/20/22 1206     Special Requests: --        RIGHT  Antecubital       Culture result: NO GROWTH 4 DAYS       COVID-19 RAPID TEST [[id number]] Collected: 02/16/22 1314    Order Status: Completed Specimen: Nasopharyngeal Updated: 02/16/22 1350     Specimen source Nasopharyngeal        COVID-19 rapid test Not detected        Comment:      The specimen is NEGATIVE for SARS-CoV-2, the novel coronavirus associated with COVID-19. A negative result does not rule out COVID-19. This test has been authorized by the FDA under an Emergency Use Authorization (EUA) for use by authorized laboratories. Fact sheet for Healthcare Providers: ConventionUpdate.co.nz  Fact sheet for Patients: ConventionUpdate.co.nz       Methodology: Isothermal Nucleic Acid Amplification               Other Studies:  No results found.     Current Meds:  Current Facility-Administered Medications   Medication Dose Route Frequency    OLANZapine (ZyPREXA zydis) disintegrating tablet 20 mg  20 mg Oral QHS    dexmedeTOMidine in 0.9 % NaCl (PRECEDEX) 400 mcg/100 mL (4 mcg/mL) infusion soln  0.1-1.5 mcg/kg/hr IntraVENous TITRATE    NOREPINephrine (LEVOPHED) 4 mg in 5% dextrose 250 mL infusion  0.5-16 mcg/min IntraVENous TITRATE    insulin lispro (HUMALOG) injection   SubCUTAneous Q4H    dextrose 5% infusion  100 mL/hr IntraVENous CONTINUOUS    insulin glargine (LANTUS) injection 20 Units  20 Units SubCUTAneous QHS    LORazepam (ATIVAN) injection 1 mg  1 mg IntraVENous Q2H PRN    dextrose 40% (GLUTOSE) oral gel 1 Tube  15 g Oral PRN    glucagon (GLUCAGEN) injection 1 mg  1 mg IntraMUSCular PRN    sodium chloride (NS) flush 5-40 mL  5-40 mL IntraVENous Q8H    sodium chloride (NS) flush 5-40 mL  5-40 mL IntraVENous PRN    acetaminophen (TYLENOL) tablet 650 mg  650 mg Oral Q6H PRN    Or    acetaminophen (TYLENOL) suppository 650 mg  650 mg Rectal Q6H PRN    polyethylene glycol (MIRALAX) packet 17 g  17 g Oral DAILY PRN    ondansetron (ZOFRAN ODT) tablet 4 mg  4 mg Oral Q8H PRN    Or    ondansetron (ZOFRAN) injection 4 mg  4 mg IntraVENous Q6H PRN    enoxaparin (LOVENOX) injection 40 mg  40 mg SubCUTAneous Q24H    albuterol (PROVENTIL VENTOLIN) nebulizer solution 2.5 mg  2.5 mg Nebulization Q6H PRN    atorvastatin (LIPITOR) tablet 10 mg  10 mg Oral QHS    emtricitabine-tenofovir (TDF) (TRUVADA) 200-300 mg per tablet 1 Tablet  1 Tablet Oral DAILY    OXcarbazepine (TRILEPTAL) tablet 300 mg  300 mg Oral Q12H    NUTRITIONAL SUPPORT ELECTROLYTE PRN ORDERS   Does Not Apply PRN       Signed:  Paige Lawson MD    Part of this note may have been written by using a voice dictation software. The note has been proof read but may still contain some grammatical/other typographical errors.

## 2022-02-20 NOTE — PROGRESS NOTES
TRANSFER - OUT REPORT:    Verbal report given to LIBBY Wright (name) on Dinh Vo  being transferred to 18 (unit) for routine progression of care       Report consisted of patients Situation, Background, Assessment and   Recommendations(SBAR). Information from the following report(s) SBAR, Kardex, ED Summary, Procedure Summary, Intake/Output, MAR, Recent Results, Cardiac Rhythm SR and Alarm Parameters  was reviewed with the receiving nurse. Opportunity for questions and clarification was provided.       Patient transported with:   Monitor  Registered Nurse

## 2022-02-20 NOTE — PROGRESS NOTES
ACUTE OT GOALS:  (Developed with and agreed upon by patient and/or caregiver.)  1. Patient will complete lower body bathing and dressing with min A and adaptive equipment as needed. 2. Patient will complete toileting with min A.   3. Patient will tolerate 30 minutes of OT treatment with 1-2 rest breaks to increase activity tolerance for ADLs. 4. Patient will complete functional transfers with min A and adaptive equipment as needed. 5. Patient will complete functional mobility for household distances with CGA and adaptive equipment as needed. 6. Patient will complete functional activity while seated EOB with CGA and adaptive equipment as needed. 7. Patient will follow 100% of commands with min verbal cues from therapist to increase participation in ADLs.      Timeframe: 7 visits         OCCUPATIONAL THERAPY ASSESSMENT: Initial Assessment and Daily Note OT Treatment Day # 1    Ivette López is a 46 y.o. male   PRIMARY DIAGNOSIS: DKA (diabetic ketoacidosis) ([facility] [address]  DKA (diabetic ketoacidosis) ([facility] [address] [E11.10]  Sepsis ([facility] [address] [A41.9]       Reason for Referral:   ICD-10: Treatment Diagnosis: Generalized Muscle Weakness (M62.81)  INPATIENT: Payor: [facility] MEDICARE / Plan: [address] / Product Type: Managed Care Medicare /   ASSESSMENT:     REHAB RECOMMENDATIONS:   Recommendation to date pending progress:  Setting:   Short-term Rehab  Equipment:    To Be Determined     PRIOR LEVEL OF FUNCTION:  (Prior to Hospitalization)  INITIAL/CURRENT LEVEL OF FUNCTION:  (Based on today's evaluation)   Bathing:   Unknown  Dressing:   Unknown  Feeding/Grooming:   Unknown  Toileting:   Unknown  Functional Mobility:   Unknown Bathing:   Maximal Assistance  Dressing:   Total Assistance  Feeding/Grooming:   Maximal Assistance  Toileting:   Total Assistance  Functional Mobility:   Moderate Assistance x 2     ASSESSMENT:  Mr. Mayda Stephens presents with deficits in overall strength, activity tolerance, ADL performance and functional mobility. Currently in ICU for DKA and encephalopathy (lap belt and soft mitts). Very confused today however calm and following commands well with cues. BUE AROM and strength (3/5) are generally decreased but WFL. Mod A x 2 for functional bed mobility/transfers; fair EOB sitting balance with occasional min A required for impulsivity. Total A to christine socks. Min A to wash face however verbal cues required for task initiation and facilitation. Min to mod A x 2 for sit <> stand and side steps towards HOB. Unsteady in standing but overall moving well. At this time, Nurys  is functioning below baseline for ADLs and functional mobility. Pt would benefit from skilled OT services to address OT goals and and plan of care. .     SUBJECTIVE:   Mr. Luis Medrano states, \"I'm alright. \"    SOCIAL HISTORY/LIVING ENVIRONMENT: Lives alone and has a sister who lives nearby per RN however no other PLOF.    Support Systems: Other Family Member(s)    OBJECTIVE:     PAIN: VITAL SIGNS: LINES/DRAINS:   Pre Treatment: Pain Screen  Pain Scale 1: Numeric (0 - 10)  Pain Intensity 1: 0  Post Treatment: 0   [place] Catheter and IV  O2 Device: None (Room air)     GROSS EVALUATION:  BUEs Within Functional Limits Abnormal/ Functional Abnormal/ Non-Functional (see comments) Not Tested Comments:   AROM [] [x] [] []    PROM [] [] [] []    Strength [] [x] [] [] Generally weak   Balance [] [] [] [] Fair EOB sitting balance; poor standing    Posture [] [x] [] []    Sensation [] [] [] [x]    Coordination [] [] [] [x]    Tone [] [] [] [x]    Edema [] [] [] [x]    Activity Tolerance [] [x] [] [] Generally decreased    [] [] [] []      COGNITION/  PERCEPTION: Intact Impaired   (see comments) Comments:   Orientation [] [x] AMS   Vision [x] []    Hearing [] [x]    Judgment/ Insight [] [x] Poor insight/awareness   Attention [] [x] Cues    Memory [] [x] AMS   Command Following [] [x] Cues   Emotional Regulation [x] []     [] []      ACTIVITIES OF DAILY LIVING: I Mod I S SBA CGA Min Mod Max Total NT Comments   BASIC ADLs:              Bathing/ Showering [] [] [] [] [] [] [] [] [] [x]    Toileting [] [] [] [] [] [] [] [] [] [x]    Dressing [] [] [] [] [] [] [] [] [x] [] socks   Feeding [] [] [] [] [] [] [] [] [] [x]    Grooming [] [] [] [] [] [x] [] [] [] [] [place] to wash face   Personal Device Care [] [] [] [] [] [] [] [] [] [x]    Functional Mobility [] [] [] [] [] [x] [x] [] [] [] X 2   I=Independent, Mod I=Modified Independent, S=Supervision, SBA=Standby Assistance, CGA=Contact Guard Assistance,   Min=Minimal Assistance, Mod=Moderate Assistance, Max=Maximal Assistance, Total=Total Assistance, NT=Not Tested    MOBILITY: I Mod I S SBA CGA Min Mod Max Total  NT x2 Comments:   Supine to sit [] [] [] [] [] [] [x] [] [] [] [x]    Sit to supine [] [] [] [] [] [] [x] [] [] [] [x]    Sit to stand [] [] [] [] [] [x] [x] [] [] [] [x]    Bed to chair [] [] [] [] [] [] [] [] [] [x] []    I=Independent, Mod I=Modified Independent, S=Supervision, SBA=Standby Assistance, CGA=Contact Guard Assistance,   Min=Minimal Assistance, Mod=Moderate Assistance, Max=Maximal Assistance, Total=Total Assistance, NT=Not [facility] 6 Clicks   Daily Activity Inpatient Short Form        How much help from another person does the patient currently need. .. Total A Lot A Little None   1. Putting on and taking off regular lower body clothing? [x] 1   [] 2   [] 3   [] 4   2. Bathing (including washing, rinsing, drying)? [] 1   [x] 2   [] 3   [] 4   3. Toileting, which includes using toilet, bedpan or urinal?   [x] 1   [] 2   [] 3   [] 4   4. Putting on and taking off regular upper body clothing? [] 1   [x] 2   [] 3   [] 4   5. Taking care of personal grooming such as brushing teeth? [] 1   [x] 2   [] 3   [] 4   6. Eating meals? [] 1   [x] 2   [] 3   [] 4   © 2007, Trustees of [facility] [employer], under license to [employer].  All rights reserved     Score:  Initial: 10 Most Recent: X (Date: -- )   Interpretation of Tool:  Represents activities that are increasingly more difficult (i.e. Bed mobility, Transfers, Gait). PLAN:   FREQUENCY/DURATION: OT Plan of Care: 3 times/week for duration of hospital stay or until stated goals are met, whichever comes first.    PROBLEM LIST:   (Skilled intervention is medically necessary to address:)  1. Decreased ADL/Functional Activities  2. Decreased Activity Tolerance  3. Decreased AROM/PROM  4. Decreased Balance  5. Decreased Cognition  6. Decreased Coordination  7. Decreased Gait Ability  8. Decreased Strength  9. Decreased Transfer Abilities   INTERVENTIONS PLANNED:   (Benefits and precautions of occupational therapy have been discussed with the patient.)  1. Self Care Training  2. Therapeutic Activity  3. Therapeutic Exercise/HEP  4. Neuromuscular Re-education  5. Education     TREATMENT:     EVALUATION: Low Complexity : (Untimed Charge)    TREATMENT:   ($$ Self Care/Home Management: 8-22 mins$$ Neuromuscular Re-Education: 8-22 mins   )  Self Care (13 Minutes): Self care including Lower Body Dressing, Grooming and Energy Conservation Training to increase independence and decrease level of assistance required. Neuromuscular Re-education (10 Minutes): Neuromuscular Re-education included Balance Training, Coordination training, Postural training, Sitting balance training and Standing balance training to improve Balance, Coordination and Postural Control.     TREATMENT GRID:  N/A    AFTER TREATMENT POSITION/PRECAUTIONS:  Bed, Needs within reach, RN notified and soft mitts and lap belt in place per RN    INTERDISCIPLINARY COLLABORATION:  RN/PCT, PT/PTA and OT/OTTO    TOTAL TREATMENT DURATION:  OT Patient Time In/Time Out  Time In: 0123  Time Out: [address] [facility], OT

## 2022-02-20 NOTE — PROGRESS NOTES
ACUTE PHYSICAL THERAPY GOALS:  (Developed with and agreed upon by patient and/or caregiver. )  LTG:  (1.)Mr. Catalan will move from supine to sit and sit to supine , scoot up and down and roll side to side in bed with MODIFIED INDEPENDENCE within 7 treatment day(s). (2.)Mr. Catalan will transfer from bed to chair and chair to bed with STAND BY ASSIST using the least restrictive device within 7 treatment day(s). (3.)Mr. Catalan will ambulate with CONTACT GUARD ASSIST for 100 feet with the least restrictive device within 7 treatment day(s). (4.)Mr. Catalan will participate in therapeutic activity/exercises x 25 minutes for increased strength within 7 treatment days. ________________________________________________________________________________________________          PHYSICAL THERAPY ASSESSMENT: Initial Assessment and AM PT Treatment Day # 1      Grisel Massey is a 46 y.o. male   PRIMARY DIAGNOSIS: DKA (diabetic ketoacidosis) ([facility] [address]  DKA (diabetic ketoacidosis) ([facility] [address] [E11.10]  Sepsis ([facility] [address] [A41.9]       Reason for Referral:    ICD-10: Treatment Diagnosis: Generalized Muscle Weakness (M62.81)  Difficulty in walking, Not elsewhere classified (R26.2)  INPATIENT: Payor: [facility] MEDICARE / Plan: [employer] / Product Type: Managed Care Medicare /     ASSESSMENT:     REHAB RECOMMENDATIONS:   Recommendation to date pending progress:  Setting:   Short-term Rehab  Equipment:    To Be Determined     PRIOR LEVEL OF FUNCTION:  (Prior to Hospitalization) INITIAL/CURRENT LEVEL OF FUNCTION:  (Most Recently Demonstrated)   Bed Mobility:   Unknown  Sit to Stand:   Unknown  Transfers:   Unknown  Gait/Mobility:   Unknown Bed Mobility:   Moderate Assistance x 2  Sit to Stand:   Moderate Assistance x 2  Transfers:   Not tested  Gait/Mobility:   Moderate Assistance x 2     ASSESSMENT:  Mr. Marvin White presents to PT with decreased AROM and strength in B LEs.  Pt lethargic today but able to follow commands intermittently. He has slurred speech which RN reported thought was baseline. Pt given min-modA x 2 for bed mobility with fair-poor sitting balance. Pt stood with modA x 2 today and was able to take several side steps with poor standing balance. Mr. Michelle Sawyer could benefit from skilled PT as he is currently functioning below his baseline.         SUBJECTIVE:   Mr. Michelle Sawyer states, \"[place]\"    SOCIAL HISTORY/LIVING ENVIRONMENT: Lives alone and PLOF unknown given pt's AMS  Support Systems: Other Family Member(s)  OBJECTIVE:     PAIN: VITAL SIGNS: LINES/DRAINS:   Pre Treatment: Pain Screen  Pain Scale 1: Numeric (0 - 10)  Pain Intensity 1: 0  Post Treatment: 0   Continuous Pulse Oximetry, IV and condom catheter  O2 Device: None (Room air)     GROSS EVALUATION:  B LE Within Functional Limits Abnormal/ Functional Abnormal/ Non-Functional (see comments) Not Tested Comments:   AROM [] [x] [] []    PROM [] [] [] []    Strength [] [x] [] []    Balance [] [] [x] [] Fair-poor sitting and poor standing   Posture [] [] [] []    Sensation [] [] [] []    Coordination [] [x] [] [] decreased   Tone [] [] [] []    Edema [] [] [] []    Activity Tolerance [] [x] [] [] lethargic    [] [] [] []      COGNITION/  PERCEPTION: Intact Impaired   (see comments) Comments:   Orientation [] [x] Some confusion   Vision [] []    Hearing [] []    Command Following [] [x] Intermittent    Safety Awareness [] [x] Decreased awareness of environemnt    [] []      MOBILITY: I Mod I S SBA CGA Min Mod Max Total  NT x2 Comments:   Bed Mobility    Rolling [] [] [] [] [] [] [] [] [] [] []    Supine to Sit [] [] [] [] [] [x] [x] [] [] [] [x]    Scooting [] [] [] [] [] [] [] [] [x] [] []    Sit to Supine [] [] [] [] [] [x] [] [] [] [] [x]    Transfers    Sit to Stand [] [] [] [] [] [x] [x] [] [] [] [x]    Bed to Chair [] [] [] [] [] [] [] [] [] [] []    Stand to Sit [] [] [] [] [] [x] [x] [] [] [] [x]    I=Independent, Mod I=Modified Independent, S=Supervision, SBA=Standby Assistance, CGA=Contact Guard Assistance,   Min=Minimal Assistance, Mod=Moderate Assistance, Max=Maximal Assistance, Total=Total Assistance, NT=Not Tested  GAIT: I Mod I S SBA CGA Min Mod Max Total  NT x2 Comments:   Level of Assistance [] [] [] [] [] [x] [x] [] [] [] [x] Side steps   Distance 2 ft    DME B HHA    Gait Quality Decreased B LE step length    Weightbearing Status N/A     I=Independent, Mod I=Modified Independent, S=Supervision, SBA=Standby Assistance, CGA=Contact Guard Assistance,   Min=Minimal Assistance, Mod=Moderate Assistance, Max=Maximal Assistance, Total=Total Assistance, NT=Not Tested    [address] [place] [id number] AM-[facility]       How much difficulty does the patient currently have. .. Unable A Lot A Little None   1. Turning over in bed (including adjusting bedclothes, sheets and blankets)? [] 1   [] 2   [x] 3   [] 4   2. Sitting down on and standing up from a chair with arms ( e.g., wheelchair, bedside commode, etc.)   [] 1   [] 2   [x] 3   [] 4   3. Moving from lying on back to sitting on the side of the bed? [] 1   [] 2   [x] 3   [] 4   How much help from another person does the patient currently need. .. Total A Lot A Little None   4. Moving to and from a bed to a chair (including a wheelchair)? [] 1   [x] 2   [] 3   [] 4   5. Need to walk in hospital room? [] 1   [x] 2   [] 3   [] 4   6. Climbing 3-5 steps with a railing? [x] 1   [] 2   [] 3   [] 4   © 2007, Trustees of [address] [place] [id number], under license to [employer]. All rights reserved     Score:  Initial: 14 Most Recent: X (Date: -- )    Interpretation of Tool:  Represents activities that are increasingly more difficult (i.e. Bed mobility, Transfers, Gait).     PLAN:   FREQUENCY/DURATION: PT Plan of Care: 3 times/week for duration of hospital stay or until stated goals are met, whichever comes first.    PROBLEM LIST:   (Skilled intervention is medically necessary to address:)  1. Decreased ADL/Functional Activities  2. Decreased Activity Tolerance  3. Decreased AROM/PROM  4. Decreased Balance  5. Decreased Cognition  6. Decreased Coordination  7. Decreased Gait Ability  8. Decreased Strength  9. Decreased Transfer Abilities   INTERVENTIONS PLANNED:   (Benefits and precautions of physical therapy have been discussed with the patient.)  1. Therapeutic Activity  2. Therapeutic Exercise/HEP  3. Neuromuscular Re-education  4. Gait Training  5. Manual Therapy  6. Education     TREATMENT:     EVALUATION: Low Complexity : (Untimed Charge)    TREATMENT:   ($$ Therapeutic Activity: 23-37 mins    )  Co-Treatment PT/OT necessary due to patient's decreased overall endurance/tolerance levels, as well as need for high level skilled assistance to complete functional transfers/mobility and functional tasks  Therapeutic Activity (23 Minutes): Therapeutic activity included Rolling, Supine to Sit, Sit to Supine, Scooting, Transfer Training, Ambulation on level ground, Sitting balance  and Standing balance to improve functional Mobility, Strength and Activity tolerance.     TREATMENT GRID:  N/A    AFTER TREATMENT POSITION/PRECAUTIONS:  Bed, Restraints  and RN notified    INTERDISCIPLINARY COLLABORATION:  RN/PCT, PT/PTA and OT/OTTO    TOTAL TREATMENT DURATION:  PT Patient Time In/Time Out  Time In: 7991  Time Out: [address] DPT

## 2022-02-21 PROBLEM — D69.6 THROMBOCYTOPENIA (HCC): Status: ACTIVE | Noted: 2022-02-21

## 2022-02-21 LAB
ALBUMIN SERPL-MCNC: 2.5 G/DL (ref 3.5–5)
ALBUMIN/GLOB SERPL: 0.7 {RATIO} (ref 1.2–3.5)
ALP SERPL-CCNC: 65 U/L (ref 50–136)
ALT SERPL-CCNC: 44 U/L (ref 12–65)
ANION GAP SERPL CALC-SCNC: 8 MMOL/L (ref 7–16)
AST SERPL-CCNC: 37 U/L (ref 15–37)
BACTERIA SPEC CULT: NORMAL
BACTERIA SPEC CULT: NORMAL
BILIRUB SERPL-MCNC: 0.7 MG/DL (ref 0.2–1.1)
BUN SERPL-MCNC: 9 MG/DL (ref 6–23)
CALCIUM SERPL-MCNC: 8.7 MG/DL (ref 8.3–10.4)
CHLORIDE SERPL-SCNC: 112 MMOL/L (ref 98–107)
CO2 SERPL-SCNC: 24 MMOL/L (ref 21–32)
CREAT SERPL-MCNC: 0.8 MG/DL (ref 0.8–1.5)
ERYTHROCYTE [DISTWIDTH] IN BLOOD BY AUTOMATED COUNT: 15.3 % (ref 11.9–14.6)
FIBRINOGEN PPP-MCNC: 672 MG/DL (ref 190–501)
GLOBULIN SER CALC-MCNC: 3.5 G/DL (ref 2.3–3.5)
GLUCOSE BLD STRIP.AUTO-MCNC: 189 MG/DL (ref 65–100)
GLUCOSE BLD STRIP.AUTO-MCNC: 201 MG/DL (ref 65–100)
GLUCOSE BLD STRIP.AUTO-MCNC: 202 MG/DL (ref 65–100)
GLUCOSE BLD STRIP.AUTO-MCNC: 229 MG/DL (ref 65–100)
GLUCOSE BLD STRIP.AUTO-MCNC: 308 MG/DL (ref 65–100)
GLUCOSE SERPL-MCNC: 192 MG/DL (ref 65–100)
HCT VFR BLD AUTO: 37 % (ref 41.1–50.3)
HGB BLD-MCNC: 11.9 G/DL (ref 13.6–17.2)
INR PPP: 1
LDH SERPL L TO P-CCNC: 179 U/L (ref 100–190)
MAGNESIUM SERPL-MCNC: 2 MG/DL (ref 1.8–2.4)
MCH RBC QN AUTO: 25.6 PG (ref 26.1–32.9)
MCHC RBC AUTO-ENTMCNC: 32.2 G/DL (ref 31.4–35)
MCV RBC AUTO: 79.7 FL (ref 79.6–97.8)
NRBC # BLD: 0 K/UL (ref 0–0.2)
PLATELET # BLD AUTO: 84 K/UL (ref 150–450)
PMV BLD AUTO: 11.4 FL (ref 9.4–12.3)
POTASSIUM SERPL-SCNC: 3.3 MMOL/L (ref 3.5–5.1)
PROT SERPL-MCNC: 6 G/DL (ref 6.3–8.2)
PROTHROMBIN TIME: 13.6 SEC (ref 12.6–14.5)
RBC # BLD AUTO: 4.64 M/UL (ref 4.23–5.6)
SERVICE CMNT-IMP: ABNORMAL
SERVICE CMNT-IMP: NORMAL
SERVICE CMNT-IMP: NORMAL
SODIUM SERPL-SCNC: 144 MMOL/L (ref 136–145)
WBC # BLD AUTO: 7.1 K/UL (ref 4.3–11.1)

## 2022-02-21 PROCEDURE — 74011636637 HC RX REV CODE- 636/637: Performed by: INTERNAL MEDICINE

## 2022-02-21 PROCEDURE — 65660000000 HC RM CCU STEPDOWN

## 2022-02-21 PROCEDURE — 85027 COMPLETE CBC AUTOMATED: CPT

## 2022-02-21 PROCEDURE — 92610 EVALUATE SWALLOWING FUNCTION: CPT

## 2022-02-21 PROCEDURE — 74011636637 HC RX REV CODE- 636/637: Performed by: STUDENT IN AN ORGANIZED HEALTH CARE EDUCATION/TRAINING PROGRAM

## 2022-02-21 PROCEDURE — 83010 ASSAY OF HAPTOGLOBIN QUANT: CPT

## 2022-02-21 PROCEDURE — 82962 GLUCOSE BLOOD TEST: CPT

## 2022-02-21 PROCEDURE — 36415 COLL VENOUS BLD VENIPUNCTURE: CPT

## 2022-02-21 PROCEDURE — 80053 COMPREHEN METABOLIC PANEL: CPT

## 2022-02-21 PROCEDURE — 85384 FIBRINOGEN ACTIVITY: CPT

## 2022-02-21 PROCEDURE — 86022 PLATELET ANTIBODIES: CPT

## 2022-02-21 PROCEDURE — 2709999900 HC NON-CHARGEABLE SUPPLY

## 2022-02-21 PROCEDURE — 74011250636 HC RX REV CODE- 250/636: Performed by: STUDENT IN AN ORGANIZED HEALTH CARE EDUCATION/TRAINING PROGRAM

## 2022-02-21 PROCEDURE — 74011000250 HC RX REV CODE- 250: Performed by: INTERNAL MEDICINE

## 2022-02-21 PROCEDURE — 74011250637 HC RX REV CODE- 250/637: Performed by: INTERNAL MEDICINE

## 2022-02-21 PROCEDURE — 83735 ASSAY OF MAGNESIUM: CPT

## 2022-02-21 PROCEDURE — 74011250637 HC RX REV CODE- 250/637: Performed by: HOSPITALIST

## 2022-02-21 PROCEDURE — 83615 LACTATE (LD) (LDH) ENZYME: CPT

## 2022-02-21 PROCEDURE — 97530 THERAPEUTIC ACTIVITIES: CPT

## 2022-02-21 PROCEDURE — 85610 PROTHROMBIN TIME: CPT

## 2022-02-21 RX ORDER — INSULIN LISPRO 100 [IU]/ML
INJECTION, SOLUTION INTRAVENOUS; SUBCUTANEOUS EVERY 6 HOURS
Status: DISCONTINUED | OUTPATIENT
Start: 2022-02-21 | End: 2022-02-21

## 2022-02-21 RX ORDER — INSULIN GLARGINE 100 [IU]/ML
25 INJECTION, SOLUTION SUBCUTANEOUS
Status: DISCONTINUED | OUTPATIENT
Start: 2022-02-21 | End: 2022-02-22

## 2022-02-21 RX ORDER — TRAZODONE HYDROCHLORIDE 50 MG/1
150 TABLET ORAL
Status: DISCONTINUED | OUTPATIENT
Start: 2022-02-21 | End: 2022-02-21

## 2022-02-21 RX ORDER — INSULIN LISPRO 100 [IU]/ML
INJECTION, SOLUTION INTRAVENOUS; SUBCUTANEOUS
Status: DISCONTINUED | OUTPATIENT
Start: 2022-02-21 | End: 2022-02-23 | Stop reason: HOSPADM

## 2022-02-21 RX ORDER — POTASSIUM CHLORIDE 20 MEQ/1
40 TABLET, EXTENDED RELEASE ORAL
Status: COMPLETED | OUTPATIENT
Start: 2022-02-21 | End: 2022-02-21

## 2022-02-21 RX ORDER — HALOPERIDOL 5 MG/1
5 TABLET ORAL
Status: DISCONTINUED | OUTPATIENT
Start: 2022-02-21 | End: 2022-02-23 | Stop reason: HOSPADM

## 2022-02-21 RX ORDER — BENZTROPINE MESYLATE 1 MG/1
2 TABLET ORAL 2 TIMES DAILY
Status: DISCONTINUED | OUTPATIENT
Start: 2022-02-21 | End: 2022-02-23 | Stop reason: HOSPADM

## 2022-02-21 RX ADMIN — INSULIN LISPRO 12 UNITS: 100 INJECTION, SOLUTION INTRAVENOUS; SUBCUTANEOUS at 17:49

## 2022-02-21 RX ADMIN — POTASSIUM CHLORIDE 40 MEQ: 20 TABLET, EXTENDED RELEASE ORAL at 17:10

## 2022-02-21 RX ADMIN — Medication 3 UNITS: at 04:39

## 2022-02-21 RX ADMIN — BENZTROPINE MESYLATE 2 MG: 1 TABLET ORAL at 17:11

## 2022-02-21 RX ADMIN — OXCARBAZEPINE 300 MG: 300 TABLET, FILM COATED ORAL at 21:02

## 2022-02-21 RX ADMIN — OLANZAPINE 20 MG: 5 TABLET, ORALLY DISINTEGRATING ORAL at 21:02

## 2022-02-21 RX ADMIN — SODIUM CHLORIDE, PRESERVATIVE FREE 10 ML: 5 INJECTION INTRAVENOUS at 05:06

## 2022-02-21 RX ADMIN — Medication 6 UNITS: at 01:07

## 2022-02-21 RX ADMIN — INSULIN GLARGINE 25 UNITS: 100 INJECTION, SOLUTION SUBCUTANEOUS at 21:02

## 2022-02-21 RX ADMIN — DEXTROSE MONOHYDRATE 100 ML/HR: 5 INJECTION, SOLUTION INTRAVENOUS at 12:10

## 2022-02-21 RX ADMIN — INSULIN LISPRO 6 UNITS: 100 INJECTION, SOLUTION INTRAVENOUS; SUBCUTANEOUS at 21:02

## 2022-02-21 RX ADMIN — INSULIN LISPRO 6 UNITS: 100 INJECTION, SOLUTION INTRAVENOUS; SUBCUTANEOUS at 12:18

## 2022-02-21 RX ADMIN — SODIUM CHLORIDE, PRESERVATIVE FREE 10 ML: 5 INJECTION INTRAVENOUS at 14:57

## 2022-02-21 RX ADMIN — HALOPERIDOL 5 MG: 5 TABLET ORAL at 23:42

## 2022-02-21 RX ADMIN — DEXTROSE MONOHYDRATE 100 ML/HR: 5 INJECTION, SOLUTION INTRAVENOUS at 01:53

## 2022-02-21 RX ADMIN — ATORVASTATIN CALCIUM 10 MG: 10 TABLET, FILM COATED ORAL at 21:02

## 2022-02-21 RX ADMIN — SODIUM CHLORIDE, PRESERVATIVE FREE 10 ML: 5 INJECTION INTRAVENOUS at 21:02

## 2022-02-21 NOTE — PROGRESS NOTES
Hospitalist Progress Note   Admit Date:  2022 12:25 PM   Name:  Blair Adams   Age:  46 y.o. Sex:  male  :  1970   MRN:  [id number]   Room:      Presenting Complaint: Vomiting    Reason(s) for Admission: DKA (diabetic ketoacidosis) ([facility] [E11.10]  Sepsis [facility]) [A41.9]     Hospital Course & Interval History:   24-year-old male with a past medical history of hypertension, HIV, schizophrenia, insulin-dependent diabetes mellitus, presented in the setting of altered mental status. Most of the history obtained by brother at bedside since patient unable to provide any details. According to the brother and caregiver he has been presenting with worsening mentation for the last few days, the got worse over time so they decided to bring him to the emergency department. Otherwise they have not seen any coughing or complaining of chest pain also no evidence of abdominal pain nausea vomiting or diarrhea. In the emergency department the patient was found to be tachycardic, with leukocytosis and a blood sugar of 1410. He was a started on insulin drip. He was given IV fluids and IV antibiotics. Patient has not been letting the care aides in to his house per his mothers report. Subjective/24hr Events (22): Patient more alert per report. Still slow with responses and disoriented. Denies pain. Speech has seen and cleared for pureed diet. ROS:  10 systems reviewed and negative except as noted above.      Assessment & Plan:   DKA (diabetic ketoacidosis) ([facility] (2022)  Initially started on insulin drip with glucose stabilizer; currently off insulin drip due to resolution of anion gap  Continue to monitor blood glucose  Continue to monitor electrolytes closely and replete accordingly  A1c greater than 16  Diabetic educator pending    increase Lantus to 25 units, change to meal time SSI    Concern for sepsis(tachycardia, leukocytosis) of unclear etiology  Pro-Richi 0.16  UA negative  Lactic acid 1 8  Chest x-ray without pneumonia  Wound culture positive for light staph aureus  2/21  - monitor off of IVF, s/p vanco/zosyn x 5 day. Monitor off atbx    Acute metabolic encephalopathy  CT of the brain without any acute intracranial abnormalities  Patient has a history of schizophrenia not taking his medications  Continue to monitor mental status  Avoid sedatives  Delirium precautions  Psychiatry consulted  Currently off Precedex, continue to monitor  - psych has seen    2/21 - resume cogentin, cont zpyrexa qhs, trileptal bid. Haldol qhs prn     Hypernatremia  2/21 - corrected    Hyperlipidemia  Continue home medication    Hypertension  Hypertensives on hold at this point  Central line placed due to hypotension    Schizophrenia  We will continue patient's home medications  Apparently patient was noncompliant with his home medications, currently in four-point restraints with sedation  Psychiatry consulted, appreciate recommendations    Thrombocytopenia  - hold lovenox, send HIT panel  - add SCDs  - Check DIC w/u. Dispo/Discharge Planning:    Dispo pending clinical course - ordered PT/OT/PPD.  Per d/w mother she is concerned that he will refuse entry of care aides    Diet:  ADULT DIET Dysphagia - Pureed  DVT PPx: Lovenox  Code status: Full Code    Hospital Problems as of 2/21/2022 Never Reviewed          Codes Class Noted - Resolved POA    * (Principal) DKA (diabetic ketoacidosis) ([facility] ICD-10-CM: E11.10  ICD-9-CM: 250.12  2/16/2022 - Present Unknown        Sepsis ([facility] ICD-10-CM: A41.9  ICD-9-CM: 038.9, 995.91  2/16/2022 - Present Unknown        Schizophrenia ([facility] ICD-10-CM: F20.9  ICD-9-CM: 295.90  3/15/2020 - Present Yes        HIV (human immunodeficiency virus infection) ([facility] ICD-10-CM: B20  ICD-9-CM: V08  6/4/2014 - Present Yes              Objective:     Patient Vitals for the past 24 hrs:   Temp Pulse Resp BP SpO2   02/21/22 1141 98.5 °F (36.9 °C) 85 19 112/68 97 % 02/21/22 0850  86      02/21/22 0715 98.7 °F (37.1 °C) 83 20 118/66 97 %   02/21/22 0451  83      02/21/22 0245 98.3 °F (36.8 °C) 95 18 112/71 96 %   02/21/22 0004  85  117/78    02/20/22 2350 97.7 °F (36.5 °C) 99 24 (!) 188/87    02/20/22 1957 98 °F (36.7 °C) 87 20 131/79 96 %   02/20/22 1940  87      02/20/22 1550 98.5 °F (36.9 °C) 86 16 122/78 97 %   02/20/22 1547  85        Oxygen Therapy  O2 Sat (%): 97 % (02/21/22 1141)  Pulse via Oximetry: 81 beats per minute (02/20/22 1515)  O2 Device: None (Room air) (02/20/22 1940)  Skin Assessment: Clean, dry, & intact (02/19/22 1900)  Skin Protection for O2 Device: N/A (02/19/22 1900)  O2 Flow Rate (L/min): 3 l/min (02/20/22 0700)    Estimated body mass index is 22.13 kg/m² as calculated from the following:    Height as of 2/13/22: 5' 5\" (1.651 m). Weight as of this encounter: 60.3 kg (133 lb). Intake/Output Summary (Last 24 hours) at 2/21/2022 1528  Last data filed at 2/21/2022 1500  Gross per 24 hour   Intake 2271.03 ml   Output 200 ml   Net 2071.03 ml         Physical Exam:   Blood pressure 112/68, pulse 85, temperature 98.5 °F (36.9 °C), resp. rate 19, weight 60.3 kg (133 lb), SpO2 97 %. General:    Not in any acute distress slow to answer questions, confused  Head:  Normocephalic, atraumatic  Eyes:  Sclerae appear normal.  Pupils equally round. ENT:  Nares appear normal, no drainage. Moist oral mucosa  Neck:  No restricted ROM. Trachea midline   CV:   RRR. No m/r/g. No jugular venous distension. Lungs:   CTAB. No wheezing, rhonchi, or rales. Respirations even, unlabored  Abdomen: Bowel sounds present. Soft, nontender, nondistended. Extremities: No cyanosis or clubbing. No edema  Skin:     No rashes and normal coloration. Warm and dry. Neuro:  CN II-XII grossly intact. Sensation intact.    Psych:  Awake alert    I have reviewed ordered lab tests and independently visualized imaging below:    Recent Labs:  Recent Results (from the past 48 hour(s))   GLUCOSE, POC    Collection Time: 02/19/22  4:52 PM   Result Value Ref Range    Glucose (POC) 192 (H) 65 - 100 mg/dL    Performed by Tej    GLUCOSE, POC    Collection Time: 02/19/22  8:49 PM   Result Value Ref Range    Glucose (POC) 210 (H) 65 - 100 mg/dL    Performed by Liliana    GLUCOSE, POC    Collection Time: 02/20/22 12:47 AM   Result Value Ref Range    Glucose (POC) 219 (H) 65 - 100 mg/dL    Performed by Liliana    CBC W/O DIFF    Collection Time: 02/20/22  2:54 AM   Result Value Ref Range    WBC 7.2 4.3 - 11.1 K/uL    RBC 4.43 4.23 - 5.6 M/uL    HGB 11.4 (L) 13.6 - 17.2 g/dL    HCT 36.4 (L) 41.1 - 50.3 %    MCV 82.2 79.6 - 97.8 FL    MCH 25.7 (L) 26.1 - 32.9 PG    MCHC 31.3 (L) 31.4 - 35.0 g/dL    RDW 15.6 (H) 11.9 - 14.6 %    PLATELET 80 (L) 248 - 450 K/uL    MPV 10.9 9.4 - 12.3 FL    ABSOLUTE NRBC 0.00 0.0 - 0.2 K/uL   METABOLIC PANEL, COMPREHENSIVE    Collection Time: 02/20/22  2:54 AM   Result Value Ref Range    Sodium 148 (H) 136 - 145 mmol/L    Potassium 2.8 (L) 3.5 - 5.1 mmol/L    Chloride 117 (H) 98 - 107 mmol/L    CO2 26 21 - 32 mmol/L    Anion gap 5 (L) 7 - 16 mmol/L    Glucose 197 (H) 65 - 100 mg/dL    BUN 15 6 - 23 MG/DL    Creatinine 0.70 (L) 0.8 - 1.5 MG/DL    GFR est AA >60 >60 ml/min/1.73m2    GFR est non-AA >60 >60 ml/min/1.73m2    Calcium 8.0 (L) 8.3 - 10.4 MG/DL    Bilirubin, total 0.6 0.2 - 1.1 MG/DL    ALT (SGPT) 48 12 - 65 U/L    AST (SGOT) 52 (H) 15 - 37 U/L    Alk.  phosphatase 54 50 - 136 U/L    Protein, total 5.4 (L) 6.3 - 8.2 g/dL    Albumin 2.3 (L) 3.5 - 5.0 g/dL    Globulin 3.1 2.3 - 3.5 g/dL    A-G Ratio 0.7 (L) 1.2 - 3.5     MAGNESIUM    Collection Time: 02/20/22  2:54 AM   Result Value Ref Range    Magnesium 1.9 1.8 - 2.4 mg/dL   GLUCOSE, POC    Collection Time: 02/20/22  8:20 AM   Result Value Ref Range    Glucose (POC) 184 (H) 65 - 100 mg/dL    Performed by Tej    GLUCOSE, POC    Collection Time: 02/20/22  1:45 PM   Result Value Ref Range    Glucose (POC) 206 (H) 65 - 100 mg/dL    Performed by Tej    POTASSIUM    Collection Time: 02/20/22  2:49 PM   Result Value Ref Range    Potassium 3.5 3.5 - 5.1 mmol/L   GLUCOSE, POC    Collection Time: 02/20/22  4:28 PM   Result Value Ref Range    Glucose (POC) 208 (H) 65 - 100 mg/dL    Performed by Reinaldo    GLUCOSE, POC    Collection Time: 02/20/22  8:48 PM   Result Value Ref Range    Glucose (POC) 202 (H) 65 - 100 mg/dL    Performed by Virgil    GLUCOSE, POC    Collection Time: 02/21/22  1:05 AM   Result Value Ref Range    Glucose (POC) 201 (H) 65 - 100 mg/dL    Performed by Lani Kwan    GLUCOSE, POC    Collection Time: 02/21/22  4:37 AM   Result Value Ref Range    Glucose (POC) 189 (H) 65 - 100 mg/dL    Performed by Waqas    CBC W/O DIFF    Collection Time: 02/21/22  5:33 AM   Result Value Ref Range    WBC 7.1 4.3 - 11.1 K/uL    RBC 4.64 4.23 - 5.6 M/uL    HGB 11.9 (L) 13.6 - 17.2 g/dL    HCT 37.0 (L) 41.1 - 50.3 %    MCV 79.7 79.6 - 97.8 FL    MCH 25.6 (L) 26.1 - 32.9 PG    MCHC 32.2 31.4 - 35.0 g/dL    RDW 15.3 (H) 11.9 - 14.6 %    PLATELET 84 (L) 962 - 450 K/uL    MPV 11.4 9.4 - 12.3 FL    ABSOLUTE NRBC 0.00 0.0 - 0.2 K/uL   METABOLIC PANEL, COMPREHENSIVE    Collection Time: 02/21/22  5:33 AM   Result Value Ref Range    Sodium 144 136 - 145 mmol/L    Potassium 3.3 (L) 3.5 - 5.1 mmol/L    Chloride 112 (H) 98 - 107 mmol/L    CO2 24 21 - 32 mmol/L    Anion gap 8 7 - 16 mmol/L    Glucose 192 (H) 65 - 100 mg/dL    BUN 9 6 - 23 MG/DL    Creatinine 0.80 0.8 - 1.5 MG/DL    GFR est AA >60 >60 ml/min/1.73m2    GFR est non-AA >60 >60 ml/min/1.73m2    Calcium 8.7 8.3 - 10.4 MG/DL    Bilirubin, total 0.7 0.2 - 1.1 MG/DL    ALT (SGPT) 44 12 - 65 U/L    AST (SGOT) 37 15 - 37 U/L    Alk.  phosphatase 65 50 - 136 U/L    Protein, total 6.0 (L) 6.3 - 8.2 g/dL    Albumin 2.5 (L) 3.5 - 5.0 g/dL    Globulin 3.5 2.3 - 3.5 g/dL    A-G Ratio 0.7 (L) 1.2 - 3.5     MAGNESIUM    Collection Time: 02/21/22  5:33 AM   Result Value Ref Range    Magnesium 2.0 1.8 - 2.4 mg/dL   GLUCOSE, POC    Collection Time: 02/21/22 11:43 AM   Result Value Ref Range    Glucose (POC) 229 (H) 65 - 100 mg/dL    Performed by Yared Ayala        All Micro Results     Procedure Component Value Units Date/Time    CULTURE, BLOOD [[id number]] Collected: 02/16/22 1314    Order Status: Completed Specimen: Blood Updated: 02/21/22 0720     Special Requests: --        LEFT  Antecubital       Culture result: NO GROWTH 5 DAYS       CULTURE, BLOOD [[id number]] Collected: 02/16/22 1331    Order Status: Completed Specimen: Blood Updated: 02/21/22 0720     Special Requests: --        RIGHT  Antecubital       Culture result: NO GROWTH 5 DAYS       COVID-19 RAPID TEST [[id number]] Collected: 02/16/22 1314    Order Status: Completed Specimen: Nasopharyngeal Updated: 02/16/22 1350     Specimen source Nasopharyngeal        COVID-19 rapid test Not detected        Comment:      The specimen is NEGATIVE for SARS-CoV-2, the novel coronavirus associated with COVID-19. A negative result does not rule out COVID-19. This test has been authorized by the FDA under an Emergency Use Authorization (EUA) for use by authorized laboratories. Fact sheet for Healthcare Providers: ConventionUpdate.co.nz  Fact sheet for Patients: ConventionUpdate.co.nz       Methodology: Isothermal Nucleic Acid Amplification               Other Studies:  No results found.     Current Meds:  Current Facility-Administered Medications   Medication Dose Route Frequency    insulin glargine (LANTUS) injection 25 Units  25 Units SubCUTAneous QHS    insulin lispro (HUMALOG) injection   SubCUTAneous Q6H    OLANZapine (ZyPREXA zydis) disintegrating tablet 20 mg  20 mg Oral QHS    dexmedeTOMidine in 0.9 % NaCl (PRECEDEX) 400 mcg/100 mL (4 mcg/mL) infusion soln  0.1-1.5 mcg/kg/hr IntraVENous TITRATE    NOREPINephrine (LEVOPHED) 4 mg in 5% dextrose 250 mL infusion  0.5-16 mcg/min IntraVENous TITRATE    dextrose 5% infusion  100 mL/hr IntraVENous CONTINUOUS    LORazepam (ATIVAN) injection 1 mg  1 mg IntraVENous Q2H PRN    dextrose 40% (GLUTOSE) oral gel 1 Tube  15 g Oral PRN    glucagon (GLUCAGEN) injection 1 mg  1 mg IntraMUSCular PRN    sodium chloride (NS) flush 5-40 mL  5-40 mL IntraVENous Q8H    sodium chloride (NS) flush 5-40 mL  5-40 mL IntraVENous PRN    acetaminophen (TYLENOL) tablet 650 mg  650 mg Oral Q6H PRN    Or    acetaminophen (TYLENOL) suppository 650 mg  650 mg Rectal Q6H PRN    polyethylene glycol (MIRALAX) packet 17 g  17 g Oral DAILY PRN    ondansetron (ZOFRAN ODT) tablet 4 mg  4 mg Oral Q8H PRN    Or    ondansetron (ZOFRAN) injection 4 mg  4 mg IntraVENous Q6H PRN    enoxaparin (LOVENOX) injection 40 mg  40 mg SubCUTAneous Q24H    albuterol (PROVENTIL VENTOLIN) nebulizer solution 2.5 mg  2.5 mg Nebulization Q6H PRN    atorvastatin (LIPITOR) tablet 10 mg  10 mg Oral QHS    emtricitabine-tenofovir (TDF) (TRUVADA) 200-300 mg per tablet 1 Tablet  1 Tablet Oral DAILY    OXcarbazepine (TRILEPTAL) tablet 300 mg  300 mg Oral Q12H    NUTRITIONAL SUPPORT ELECTROLYTE PRN ORDERS   Does Not Apply PRN       Signed:  Yvonne Horowitz DO    Part of this note may have been written by using a voice dictation software. The note has been proof read but may still contain some grammatical/other typographical errors.

## 2022-02-21 NOTE — PROGRESS NOTES
LTG: Patient will tolerate least restrictive diet without overt signs or symptoms of airway compromise. STG: Patient will tolerate pureed diet and thin liquids without overt signs or symptoms of airway compromise. STG: patient will tolerate ongoing trials in efforts to advance diet. STG: Patient will participate in modified barium swallow study as clinically indicated. SPEECH LANGUAGE PATHOLOGY: DYSPHAGIA- Initial Assessment    NAME/AGE/GENDER: Yousuf House is a 46 y.o. male  DATE: 2/21/2022  PRIMARY DIAGNOSIS: DKA (diabetic ketoacidosis) ([facility] [E11.10]  Sepsis ([facility] [A41.9]      ICD-10: Treatment Diagnosis: R13.12 Dysphagia, Oropharyngeal Phase    RECOMMENDATIONS   DIET:    Pureed   Thin Liquids    MEDICATIONS: Whole in puree     PRECAUTIONS, MODIFICATIONS, AND STRATEGIES  · Slow rate of intake  · Small bites/sips  · Upright at 90 degrees during meal  · Oral care every 3 hours  · 1:1 assistance       RECOMMENDATIONS FOR CONTINUED SPEECH THERAPY:   YES: Anticipate need for ongoing speech therapy during this hospitalization. ASSESSMENT   Patient presents with confusion but adequate oral prep/transit and functional pharyngeal swallow with thin liquids and puree. Chewables deferred due to mentation and missing upper partials. Recommend pureed diet and thin liquids. 1:1 assistance. Will follow up for diet tolerance/po trials. EDUCATION: Recommendations discussed with Patient; MD notified via perfectserve. REHABILITATION POTENTIAL FOR STATED GOALS: Good    PLAN    FREQUENCY/DURATION:   Continue to follow patient 3 times a week for duration of hospital stay to address above goals.  Recommendations for next treatment session: Next treatment session will address diet tolerance and po trials    CONTINUATION OF SKILLED SERVICES/MEDICAL NECESSITY:   Patient is expected to demonstrate progress in  swallow strength, swallow timeliness, swallow function, diet tolerance and swallow safety in order to  improve swallow safety, work toward diet advancement and decrease aspiration risk.  Patient continues to require skilled intervention due to dysphagia. SUBJECTIVE   Sideways laying down in bed and needed a lot of cueing to problem solve to sit upright. \"is this the future? \". Wanting cranberry sauce and turkey    Oxygen Device: room air  Pain: Pain Scale 1: Numeric (0 - 10)  Pain Intensity 1: 0    History of Present Injury/Illness: Mr. Tomasz Santos  has a past medical history of Autoimmune disease ([facility] Diabetes ([facility] Hypertension, Infectious disease, Paranoid schizophrenia ([facility] Psychiatric disorder, and Seizures ([facility] . He also  has a past surgical history that includes pr abdomen surgery proc unlisted. PRECAUTIONS/ALLERGIES: Thorazine [chlorpromazine]     Problem List:  (Impairments causing functional limitations):  1. Oral dysphagia  2. Increased risk of pharyngeal dysphagia  3. N/V at admission    Previous Dysphagia: NONE REPORTED  Diet Prior to Evaluation: NPO    Orientation:  Person    Cognitive-Linguistic Screening:   Alertness  o Alert   Speech Production:   o Some dysarthria, but able to understand   o Nasality slightly impaired    Expressive Language:  o Able to effectively communicate wants and needs   Receptive Language:  o Follows commands   Cognition:   o confusion  Prior Level of Function: lives alone. Caregiver for a few hours during weekdays   Recommendations: Given results of screening, AMS. Do not anticipate formal evaluation. OBJECTIVE   Oral Motor:   · Labial: No impairment  · Dentition: Limited and reportedly has upper partials  · Oral Hygiene: Dry, Poor and white coating  · Lingual: Decreased strength    Dysphagia evaluation:   Patient presented with ice chips, thin liquid via straw. Delayed mild cough on first sip as patient immediately took serial gulps. Tolerated additional 8oz taking serial gulps without change in vocal quality or overt s/sx aspiration.    Double swallow with puree that is Questionably to clear oral versus pharyngeal residue versus esophageal component. Frequent belching. No change in vocal quality. Denies stasis sensation. Tool Used: Dysphagia Outcome and Severity Scale (SULEMA)    Score Comments   Normal Diet  [] 7 With no strategies or extra time needed   Functional Swallow  [] 6 May have mild oral or pharyngeal delay   Mild Dysphagia  [] 5 Which may require one diet consistency restricted    Mild-Moderate Dysphagia  [] 4 With 1-2 diet consistencies restricted   Moderate Dysphagia  [] 3 With 2 or more diet consistencies restricted   Moderate-Severe Dysphagia  [] 2 With partial PO strategies (trials with ST only)   Severe Dysphagia  [] 1 With inability to tolerate any PO safely      Score:  Initial: 3 Most Recent: x (Date 02/21/22 )   Interpretation of Tool: The Dysphagia Outcome and Severity Scale (SULEMA) is a simple, easy-to-use, 7-point scale developed to systematically rate the functional severity of dysphagia based on objective assessment and make recommendations for diet level, independence level, and type of nutrition. Current Medications:   No current facility-administered medications on file prior to encounter. Current Outpatient Medications on File Prior to Encounter   Medication Sig Dispense Refill    nevirapine (VIRAMUNE) 200 mg tablet Take 400 mg by mouth daily.  cholecalciferol, vitamin D3, (Vitamin D3) 50 mcg (2,000 unit) tab Take 125 mcg by mouth daily.  multivitamin (ONE A DAY) tablet Take 1 Tablet by mouth daily.  albuterol (Ventolin HFA) 90 mcg/actuation inhaler Take 2 Puffs by inhalation every four (4) hours as needed for Wheezing. 2 Inhaler 11    OXcarbazepine (TRILEPTAL) 300 mg tablet Take 300 mg by mouth every twelve (12) hours.  atorvastatin (LIPITOR) 10 mg tablet Take 40 mg by mouth nightly.  benztropine (COGENTIN) 2 mg tablet Take 2 mg by mouth two (2) times a day.       OLANZapine (ZyPREXA) 20 mg tablet Take 20 mg by mouth nightly.  insulin detemir (LEVEMIR) 100 unit/mL injection 30 Units by SubCUTAneous route daily.  Cetirizine (ZYRTEC) 10 mg cap Take  by mouth daily as needed.  emtricitabine-tenofovir (TRUVADA) 200-300 mg per tablet Take 1 Tab by mouth daily.  TRAZODONE HCL (TRAZODONE PO) Take 150 mg by mouth nightly. Unknown dose       haloperidol (HALDOL) 5 mg tablet Take 5 mg by mouth nightly.  METFORMIN HCL (GLUCOPHAGE PO) Take 1,000 mg by mouth two (2) times a day.           INTERDISCIPLINARY COLLABORATION: none    After treatment position/precautions:  · Upright in bed  · Mitts in place    Total Treatment Duration:   Time In: 1239  Time Out: [address] [facility], [facility] MEDICO DEL [facility], [facility] TEJINDER BRADSHAW, CCC-SLP

## 2022-02-21 NOTE — PROGRESS NOTES
RNCM called patient's sister to discuss discharge planning and needs. Spoke with Mindy Dawn. Patient lives alone on first floor apt. Patient has an aid that comes several times a week to assist for a couple of hours daily. Patient sees PCP at [facility] and uses [facility] pharmacy on [address] [facility]. Patient has a shower chair at home but no other DME. Was independent in mobility and ADL's but declining prior to admission. Discussed LOC and PT/OT evals recommending SNF. Mindy Dawn would like referrals made to [facility] and \"the shiney new one on [facility]\". Referral made to [facility] via [address]. Jean Pierre Slider with [facility] made aware of referral faxed.

## 2022-02-21 NOTE — DIABETES MGMT
Patient admitted with DKA. Blood glucose ranged 184-219 yesterday with patient receiving Lantus 20 units and Humalog 30 units. Blood glucose this morning was 189 at 0437. Reviewed patient current regimen: Lantus 20 units nightly and Humalog correctional insulin q4h. Per chart review patient remains NPO. Per chart review patient has altered mental status. Patient would likely benefit from an increase in basal insulin as fasting blood glucose is not at goal. Patient out of ICU on regular floor provider could consider decreasing frequency of FSBS to q6h. Provider updated via IGG regarding recommendations and patient glycemic control. Patient not appropriate for diabetes education at this time. Will follow along.

## 2022-02-21 NOTE — PROGRESS NOTES
ACUTE PHYSICAL THERAPY GOALS:  (Developed with and agreed upon by patient and/or caregiver. )    LTG:  (1.)Mr. Catalan will move from supine to sit and sit to supine , scoot up and down and roll side to side in bed with MODIFIED INDEPENDENCE within 7 treatment day(s). (2.)Mr. Catalan will transfer from bed to chair and chair to bed with STAND BY ASSIST using the least restrictive device within 7 treatment day(s). (3.)Mr. Catalan will ambulate with CONTACT GUARD ASSIST for 100 feet with the least restrictive device within 7 treatment day(s). (4.)Mr. Catalan will participate in therapeutic activity/exercises x 25 minutes for increased strength within 7 treatment days. PHYSICAL THERAPY: Daily Note Treatment Day # 2    Ivette López is a 46 y.o. male   PRIMARY DIAGNOSIS: DKA (diabetic ketoacidosis) ([facility]  DKA (diabetic ketoacidosis) ([facility] [E11.10]  Sepsis ([facility] [A41.9]         ASSESSMENT:     REHAB RECOMMENDATIONS: CURRENT LEVEL OF FUNCTION:  (Most Recently Demonstrated)   Recommendation to date pending progress:  Setting:   Short-term Rehab  Equipment:    To Be Determined Bed Mobility:   Minimal Assistance x 2  Sit to Stand:   Moderate Assistance x 2  Transfers:   Not tested  Gait/Mobility:   min x 2 most of the time but mod x 2 several times unexpectedly     ASSESSMENT:  Mr. Mayda Stephens was very friendly and cooperative. He followed commands. He amb 80' x 2 with RW with min to mod a x 2 and a chair following d/t fair/fair- balance. Scissoring gt twice during amb, narrow WERO and deviates to the Left and unable to correct himself even with cues. Worked also on standing dynamic balance in hallway. SUBJECTIVE:   Mr. Mayda Stephens states [place]. I'm glad to see you. \"    SOCIAL HISTORY/ LIVING ENVIRONMENT:Lives alone and PLOF unknown given pt's AMS   Support Systems: Other Family Member(s)  OBJECTIVE:     PAIN: VITAL SIGNS: LINES/DRAINS:   Pre Treatment:  0  Post Treatment: 0   None  O2 Device: None (Room air)     MOBILITY: I Mod I S SBA CGA Min Mod Max Total  NT x2 Comments:   Bed Mobility    Rolling [] [] [] [x] [] [] [] [] [] [] []    Supine to Sit [] [] [] [x] [] [] [] [] [] [] []    Scooting [] [] [] [] [] [] [] [] [] [] []    Sit to Supine [] [] [] [x] [] [] [] [] [] [] []    Transfers    Sit to Stand [] [] [] [] [] [x] [] [] [] [] [x]    Bed to Chair [] [] [] [] [] [] [] [] [] [] []    Stand to Sit [] [] [] [] [] [x] [] [] [] [] [x]    I=Independent, Mod I=Modified Independent, S=Supervision, SBA=Standby Assistance, CGA=Contact Guard Assistance,   Min=Minimal Assistance, Mod=Moderate Assistance, Max=Maximal Assistance, Total=Total Assistance, NT=Not Tested    BALANCE: Good Fair+ Fair Fair- Poor NT Comments   Sitting Static [] [x] [] [] [] []    Sitting Dynamic [] [x] [] [] [] []              Standing Static [] [] [x] [x] [] []    Standing Dynamic [] [] [x] [x] [] []      GAIT: I Mod I S SBA CGA Min Mod Max Total  NT x2 Comments:   Level of Assistance [] [] [] [] [] [x] [x] [] [] [] [x]    Distance 80' x 2     DME Rolling Walker    Gait Quality Fluctuates, head down, deviates to the L,    narrow  WERO     Weightbearing  Status N/A     I=Independent, Mod I=Modified Independent, S=Supervision, SBA=Standby Assistance, CGA=Contact Guard Assistance,   Min=Minimal Assistance, Mod=Moderate Assistance, Max=Maximal Assistance, Total=Total Assistance, NT=Not Tested    PLAN:   FREQUENCY/DURATION: PT Plan of Care: 3 times/week for duration of hospital stay or until stated goals are met, whichever comes first.  TREATMENT:     TREATMENT:   ($$ Therapeutic Activity: 23-37 mins    )  Therapeutic Activity (23 Minutes): Therapeutic activity included Supine to Sit, Sit to Supine, Ambulation on level ground and Standing balance to improve functional Mobility, Strength, Activity tolerance and balance.     TREATMENT GRID:  N/A    AFTER TREATMENT POSITION/PRECAUTIONS:  Alarm Activated, Bed, Needs within reach and mittens replaced    INTERDISCIPLINARY COLLABORATION:  RN/PCT, PT/PTA and Rehab Attendant     TOTAL TREATMENT DURATION:  PT Patient Time In/Time Out  Time In: 1403  Time Out: [address] [place], [place]

## 2022-02-21 NOTE — PROGRESS NOTES
Critical Care Outreach Nurse Progress Report:    Subjective: In to assess pt secondary to  MEWS Score: 1 (02/21/22 0245)    Vitals:    02/21/22 0245 02/21/22 0451 02/21/22 0715 02/21/22 0850   BP: 112/71  118/66    Pulse: 95 83 83 86   Resp: 18  20    Temp: 98.3 °F (36.8 °C)  98.7 °F (37.1 °C)    SpO2: 96%  97%    Weight:            Objective:     Pain Intensity 1: 0 (02/20/22 7777)        Patient Stated Pain Goal: 0    Assessment:   Pt on RA during visit. Pt alert and confused; Pt asked if he was dreaming. No needs or concerns expressed at this time. Plan: Will continue to follow up per outreach protocol.

## 2022-02-21 NOTE — PROGRESS NOTES
Physician Progress Note      Raegan Doyle  CSN #:                  [id number]  :                       1970  ADMIT DATE:       2022 12:25 PM  [address] [place] DATE:  RESPONDING  PROVIDER #:        Clarissa Proctor MD          QUERY TEXT:    Pt admitted with DKA. Pt noted to have sepsis. If possible, please document in the progress notes and discharge summary if you are evaluating and/or treating any of the following: The medical record reflects the following:  Risk Factors: DKA, sepsis  Clinical Indicators: hypotension  Treatment: Levophed  Options provided:  -- Septic Shock  -- Hypovolemic Shock  -- Other - I will add my own diagnosis  -- Disagree - Not applicable / Not valid  -- Disagree - Clinically unable to determine / Unknown  -- Refer to Clinical Documentation Reviewer    PROVIDER RESPONSE TEXT:    Provider disagreed with this query.     Query created by: Rafa Gilbert on 2022 2:04 PM      Electronically signed by:  Clarissa Proctor MD 2022 3:03 PM

## 2022-02-21 NOTE — PROGRESS NOTES
Hospitalist Progress Note   Admit Date:  2022 12:25 PM   Name:  Peter Rangel   Age:  46 y.o. Sex:  male  :  1970   MRN:  [id number]   Room:      Presenting Complaint: Vomiting    Reason(s) for Admission: DKA (diabetic ketoacidosis) ([email or web address] [E11.10]  Sepsis [facility]) [A41.9]     Hospital Course & Interval History:   58-year-old male with a past medical history of hypertension, HIV, schizophrenia, insulin-dependent diabetes mellitus, presented in the setting of altered mental status. Most of the history obtained by brother at bedside since patient unable to provide any details. According to the brother and caregiver he has been presenting with worsening mentation for the last few days, the got worse over time so they decided to bring him to the emergency department. Otherwise they have not seen any coughing or complaining of chest pain also no evidence of abdominal pain nausea vomiting or diarrhea. In the emergency department the patient was found to be tachycardic, with leukocytosis and a blood sugar of 1410. He was a started on insulin drip. He was given IV fluids and IV antibiotics. Patient has not been letting the care aides in to his house per his mothers report. Subjective/24hr Events (22): Patient more alert per report. Still slow with responses and disoriented. Denies pain. Speech has seen and cleared for pureed diet. ROS:  10 systems reviewed and negative except as noted above.      Assessment & Plan:   DKA (diabetic ketoacidosis) ([email or web address] (2022)  Initially started on insulin drip with glucose stabilizer; currently off insulin drip due to resolution of anion gap  Continue to monitor blood glucose  Continue to monitor electrolytes closely and replete accordingly  A1c greater than 16  Diabetic educator pending    increase Lantus to 25 units, change to meal time SSI    Concern for sepsis(tachycardia, leukocytosis) of unclear etiology  Pro-Richi 0.16  UA negative  Lactic acid 1 8  Chest x-ray without pneumonia  Wound culture positive for light staph aureus  2/21  - monitor off of IVF, s/p vanco/zosyn x 5 day. Monitor off atbx    Acute metabolic encephalopathy  CT of the brain without any acute intracranial abnormalities  Patient has a history of schizophrenia not taking his medications  Continue to monitor mental status  Avoid sedatives  Delirium precautions  Psychiatry consulted  Currently off Precedex, continue to monitor  - psych has seen    2/21 - resume cogentin, cont zpyrexa qhs, trileptal bid. Haldol qhs prn     Hypernatremia  2/21 - corrected    Hyperlipidemia  Continue home medication    Hypertension  Hypertensives on hold at this point  Central line placed due to hypotension    Schizophrenia  We will continue patient's home medications  Apparently patient was noncompliant with his home medications, currently in four-point restraints with sedation  Psychiatry consulted, appreciate recommendations    Thrombocytopenia  - hold lovenox, send HIT panel  - add SCDs  - Check DIC w/u. Dispo/Discharge Planning:    Dispo pending clinical course - ordered PT/OT/PPD.  Per d/w mother she is concerned that he will refuse entry of care aides    Diet:  ADULT DIET Dysphagia - Pureed  DVT PPx: Lovenox  Code status: Full Code    Hospital Problems as of 2/21/2022 Never Reviewed          Codes Class Noted - Resolved POA    Thrombocytopenia ([facility] ICD-10-CM: D69.6  ICD-9-CM: 287.5  2/21/2022 - Present Unknown        * (Principal) DKA (diabetic ketoacidosis) ([facility] ICD-10-CM: E11.10  ICD-9-CM: 250.12  2/16/2022 - Present Unknown        Sepsis ([facility] ICD-10-CM: A41.9  ICD-9-CM: 038.9, 995.91  2/16/2022 - Present Unknown        Schizophrenia ([facility] ICD-10-CM: F20.9  ICD-9-CM: 295.90  3/15/2020 - Present Yes        HIV (human immunodeficiency virus infection) ([facility] ICD-10-CM: B20  ICD-9-CM: V08  6/4/2014 - Present Yes              Objective:     Patient Vitals for the past 24 hrs:   Temp Pulse Resp BP SpO2   02/21/22 1549 99 °F (37.2 °C) 77  (!) 100/56 97 %   02/21/22 1141 98.5 °F (36.9 °C) 85 19 112/68 97 %   02/21/22 0850  86      02/21/22 0715 98.7 °F (37.1 °C) 83 20 118/66 97 %   02/21/22 0451  83      02/21/22 0245 98.3 °F (36.8 °C) 95 18 112/71 96 %   02/21/22 0004  85  117/78    02/20/22 2350 97.7 °F (36.5 °C) 99 24 (!) 188/87    02/20/22 1957 98 °F (36.7 °C) 87 20 131/79 96 %   02/20/22 1940  87        Oxygen Therapy  O2 Sat (%): 97 % (02/21/22 1549)  Pulse via Oximetry: 81 beats per minute (02/20/22 1515)  O2 Device: None (Room air) (02/20/22 1940)  Skin Assessment: Clean, dry, & intact (02/19/22 1900)  Skin Protection for O2 Device: N/A (02/19/22 1900)  O2 Flow Rate (L/min): 3 l/min (02/20/22 0700)    Estimated body mass index is 22.13 kg/m² as calculated from the following:    Height as of 2/13/22: 5' 5\" (1.651 m). Weight as of this encounter: 60.3 kg (133 lb). Intake/Output Summary (Last 24 hours) at 2/21/2022 1659  Last data filed at 2/21/2022 1500  Gross per 24 hour   Intake 2271.03 ml   Output 200 ml   Net 2071.03 ml         Physical Exam:   Blood pressure (!) 100/56, pulse 77, temperature 99 °F (37.2 °C), resp. rate 19, weight 60.3 kg (133 lb), SpO2 97 %. General:    Not in any acute distress slow to answer questions, confused  Head:  Normocephalic, atraumatic  Eyes:  Sclerae appear normal.  Pupils equally round. ENT:  Nares appear normal, no drainage. Moist oral mucosa  Neck:  No restricted ROM. Trachea midline   CV:   RRR. No m/r/g. No jugular venous distension. Lungs:   CTAB. No wheezing, rhonchi, or rales. Respirations even, unlabored  Abdomen: Bowel sounds present. Soft, nontender, nondistended. Extremities: No cyanosis or clubbing. No edema  Skin:     No rashes and normal coloration. Warm and dry. Neuro:  CN II-XII grossly intact. Sensation intact.    Psych:  Awake alert    I have reviewed ordered lab tests and independently visualized imaging below:    Recent Labs:  Recent Results (from the past 48 hour(s))   GLUCOSE, POC    Collection Time: 02/19/22  8:49 PM   Result Value Ref Range    Glucose (POC) 210 (H) 65 - 100 mg/dL    Performed by [facility]    GLUCOSE, POC    Collection Time: 02/20/22 12:47 AM   Result Value Ref Range    Glucose (POC) 219 (H) 65 - 100 mg/dL    Performed by [facility]    CBC W/O DIFF    Collection Time: 02/20/22  2:54 AM   Result Value Ref Range    WBC 7.2 4.3 - 11.1 K/uL    RBC 4.43 4.23 - 5.6 M/uL    HGB 11.4 (L) 13.6 - 17.2 g/dL    HCT 36.4 (L) 41.1 - 50.3 %    MCV 82.2 79.6 - 97.8 FL    MCH 25.7 (L) 26.1 - 32.9 PG    MCHC 31.3 (L) 31.4 - 35.0 g/dL    RDW 15.6 (H) 11.9 - 14.6 %    PLATELET 80 (L) 741 - 450 K/uL    MPV 10.9 9.4 - 12.3 FL    ABSOLUTE NRBC 0.00 0.0 - 0.2 K/uL   METABOLIC PANEL, COMPREHENSIVE    Collection Time: 02/20/22  2:54 AM   Result Value Ref Range    Sodium 148 (H) 136 - 145 mmol/L    Potassium 2.8 (L) 3.5 - 5.1 mmol/L    Chloride 117 (H) 98 - 107 mmol/L    CO2 26 21 - 32 mmol/L    Anion gap 5 (L) 7 - 16 mmol/L    Glucose 197 (H) 65 - 100 mg/dL    BUN 15 6 - 23 MG/DL    Creatinine 0.70 (L) 0.8 - 1.5 MG/DL    GFR est AA >60 >60 ml/min/1.73m2    GFR est non-AA >60 >60 ml/min/1.73m2    Calcium 8.0 (L) 8.3 - 10.4 MG/DL    Bilirubin, total 0.6 0.2 - 1.1 MG/DL    ALT (SGPT) 48 12 - 65 U/L    AST (SGOT) 52 (H) 15 - 37 U/L    Alk.  phosphatase 54 50 - 136 U/L    Protein, total 5.4 (L) 6.3 - 8.2 g/dL    Albumin 2.3 (L) 3.5 - 5.0 g/dL    Globulin 3.1 2.3 - 3.5 g/dL    A-G Ratio 0.7 (L) 1.2 - 3.5     MAGNESIUM    Collection Time: 02/20/22  2:54 AM   Result Value Ref Range    Magnesium 1.9 1.8 - 2.4 mg/dL   GLUCOSE, POC    Collection Time: 02/20/22  8:20 AM   Result Value Ref Range    Glucose (POC) 184 (H) 65 - 100 mg/dL    Performed by Tej    GLUCOSE, POC    Collection Time: 02/20/22  1:45 PM   Result Value Ref Range    Glucose (POC) 206 (H) 65 - 100 mg/dL Performed by Brunilda    POTASSIUM    Collection Time: 02/20/22  2:49 PM   Result Value Ref Range    Potassium 3.5 3.5 - 5.1 mmol/L   GLUCOSE, POC    Collection Time: 02/20/22  4:28 PM   Result Value Ref Range    Glucose (POC) 208 (H) 65 - 100 mg/dL    Performed by Reinaldo    GLUCOSE, POC    Collection Time: 02/20/22  8:48 PM   Result Value Ref Range    Glucose (POC) 202 (H) 65 - 100 mg/dL    Performed by Virgil    GLUCOSE, POC    Collection Time: 02/21/22  1:05 AM   Result Value Ref Range    Glucose (POC) 201 (H) 65 - 100 mg/dL    Performed by Adeel Kumar    GLUCOSE, POC    Collection Time: 02/21/22  4:37 AM   Result Value Ref Range    Glucose (POC) 189 (H) 65 - 100 mg/dL    Performed by Waqas    CBC W/O DIFF    Collection Time: 02/21/22  5:33 AM   Result Value Ref Range    WBC 7.1 4.3 - 11.1 K/uL    RBC 4.64 4.23 - 5.6 M/uL    HGB 11.9 (L) 13.6 - 17.2 g/dL    HCT 37.0 (L) 41.1 - 50.3 %    MCV 79.7 79.6 - 97.8 FL    MCH 25.6 (L) 26.1 - 32.9 PG    MCHC 32.2 31.4 - 35.0 g/dL    RDW 15.3 (H) 11.9 - 14.6 %    PLATELET 84 (L) 158 - 450 K/uL    MPV 11.4 9.4 - 12.3 FL    ABSOLUTE NRBC 0.00 0.0 - 0.2 K/uL   METABOLIC PANEL, COMPREHENSIVE    Collection Time: 02/21/22  5:33 AM   Result Value Ref Range    Sodium 144 136 - 145 mmol/L    Potassium 3.3 (L) 3.5 - 5.1 mmol/L    Chloride 112 (H) 98 - 107 mmol/L    CO2 24 21 - 32 mmol/L    Anion gap 8 7 - 16 mmol/L    Glucose 192 (H) 65 - 100 mg/dL    BUN 9 6 - 23 MG/DL    Creatinine 0.80 0.8 - 1.5 MG/DL    GFR est AA >60 >60 ml/min/1.73m2    GFR est non-AA >60 >60 ml/min/1.73m2    Calcium 8.7 8.3 - 10.4 MG/DL    Bilirubin, total 0.7 0.2 - 1.1 MG/DL    ALT (SGPT) 44 12 - 65 U/L    AST (SGOT) 37 15 - 37 U/L    Alk.  phosphatase 65 50 - 136 U/L    Protein, total 6.0 (L) 6.3 - 8.2 g/dL    Albumin 2.5 (L) 3.5 - 5.0 g/dL    Globulin 3.5 2.3 - 3.5 g/dL    A-G Ratio 0.7 (L) 1.2 - 3.5     MAGNESIUM    Collection Time: 02/21/22  5:33 AM   Result Value Ref Range    Magnesium 2.0 1.8 - 2.4 mg/dL   GLUCOSE, POC    Collection Time: 02/21/22 11:43 AM   Result Value Ref Range    Glucose (POC) 229 (H) 65 - 100 mg/dL    Performed by Izabella Palafox        All Micro Results     Procedure Component Value Units Date/Time    CULTURE, BLOOD [[id number]] Collected: 02/16/22 1314    Order Status: Completed Specimen: Blood Updated: 02/21/22 0720     Special Requests: --        LEFT  Antecubital       Culture result: NO GROWTH 5 DAYS       CULTURE, BLOOD [[id number]] Collected: 02/16/22 1331    Order Status: Completed Specimen: Blood Updated: 02/21/22 0720     Special Requests: --        RIGHT  Antecubital       Culture result: NO GROWTH 5 DAYS       COVID-19 RAPID TEST [[id number]] Collected: 02/16/22 1314    Order Status: Completed Specimen: Nasopharyngeal Updated: 02/16/22 1350     Specimen source Nasopharyngeal        COVID-19 rapid test Not detected        Comment:      The specimen is NEGATIVE for SARS-CoV-2, the novel coronavirus associated with COVID-19. A negative result does not rule out COVID-19. This test has been authorized by the FDA under an Emergency Use Authorization (EUA) for use by authorized laboratories. Fact sheet for Healthcare Providers: ConventionUpdate.co.nz  Fact sheet for Patients: ConventionUpdate.co.nz       Methodology: Isothermal Nucleic Acid Amplification               Other Studies:  No results found.     Current Meds:  Current Facility-Administered Medications   Medication Dose Route Frequency    insulin glargine (LANTUS) injection 25 Units  25 Units SubCUTAneous QHS    benztropine (COGENTIN) tablet 2 mg  2 mg Oral BID    haloperidoL (HALDOL) tablet 5 mg  5 mg Oral QHS PRN    insulin lispro (HUMALOG) injection   SubCUTAneous AC&HS    potassium chloride (K-DUR, KLOR-CON M20) SR tablet 40 mEq  40 mEq Oral NOW    OLANZapine (ZyPREXA zydis) disintegrating tablet 20 mg  20 mg Oral QHS    LORazepam (ATIVAN) injection 1 mg  1 mg IntraVENous Q2H PRN    dextrose 40% (GLUTOSE) oral gel 1 Tube  15 g Oral PRN    glucagon (GLUCAGEN) injection 1 mg  1 mg IntraMUSCular PRN    sodium chloride (NS) flush 5-40 mL  5-40 mL IntraVENous Q8H    sodium chloride (NS) flush 5-40 mL  5-40 mL IntraVENous PRN    acetaminophen (TYLENOL) tablet 650 mg  650 mg Oral Q6H PRN    Or    acetaminophen (TYLENOL) suppository 650 mg  650 mg Rectal Q6H PRN    polyethylene glycol (MIRALAX) packet 17 g  17 g Oral DAILY PRN    ondansetron (ZOFRAN ODT) tablet 4 mg  4 mg Oral Q8H PRN    Or    ondansetron (ZOFRAN) injection 4 mg  4 mg IntraVENous Q6H PRN    [Held by provider] enoxaparin (LOVENOX) injection 40 mg  40 mg SubCUTAneous Q24H    albuterol (PROVENTIL VENTOLIN) nebulizer solution 2.5 mg  2.5 mg Nebulization Q6H PRN    atorvastatin (LIPITOR) tablet 10 mg  10 mg Oral QHS    emtricitabine-tenofovir (TDF) (TRUVADA) 200-300 mg per tablet 1 Tablet  1 Tablet Oral DAILY    OXcarbazepine (TRILEPTAL) tablet 300 mg  300 mg Oral Q12H    NUTRITIONAL SUPPORT ELECTROLYTE PRN ORDERS   Does Not Apply PRN       Signed:  Travis Nash DO    Part of this note may have been written by using a voice dictation software. The note has been proof read but may still contain some grammatical/other typographical errors.

## 2022-02-21 NOTE — PROGRESS NOTES
END OF SHIFT NOTE:    INTAKE/OUTPUT  02/20 0701 - 02/21 0700  In: 385 [I.V.:385]  Out: 1600 [Urine:1600]  Voiding: YES  Catheter: NO  Drain:              Flatus: Patient does have flatus present. Stool:  1 occurrences. Characteristics:  Stool Assessment  Stool Color: Brown  Stool Appearance: Soft  Stool Amount: Medium  Stool Source/Status: Rectum    Emesis: 0 occurrences. Characteristics:        VITAL SIGNS  Patient Vitals for the past 12 hrs:   Temp Pulse Resp BP SpO2   02/21/22 0451  83      02/21/22 0245 98.3 °F (36.8 °C) 95 18 112/71 96 %   02/21/22 0004  85  117/78    02/20/22 2350 97.7 °F (36.5 °C) 99 24 (!) 188/87    02/20/22 1957 98 °F (36.7 °C) 87 20 131/79 96 %   02/20/22 1940  87          Pain Assessment  Pain Intensity 1: 0 (02/20/22 1547)        Patient Stated Pain Goal: 0    Ambulating  No    Shift report given to oncoming nurse at the bedside.     Eneida Bass RN

## 2022-02-21 NOTE — PROGRESS NOTES
Date of Outreach Update:  Carmine Peterson was seen and re-assessed. Previous Outreach assessment has been reviewed. There have been no significant clinical changes since the completion of the last dated Outreach assessment. Will continue to follow up per outreach protocol.     Signed By:   Rudy Glynn    February 21, 2022 1:26 PM

## 2022-02-22 LAB
ALBUMIN SERPL-MCNC: 2.3 G/DL (ref 3.5–5)
ALBUMIN/GLOB SERPL: 0.6 {RATIO} (ref 1.2–3.5)
ALP SERPL-CCNC: 66 U/L (ref 50–136)
ALT SERPL-CCNC: 38 U/L (ref 12–65)
AMMONIA PLAS-SCNC: 29 UMOL/L (ref 11–32)
ANION GAP SERPL CALC-SCNC: 5 MMOL/L (ref 7–16)
AST SERPL-CCNC: 27 U/L (ref 15–37)
BILIRUB SERPL-MCNC: 0.5 MG/DL (ref 0.2–1.1)
BUN SERPL-MCNC: 9 MG/DL (ref 6–23)
CALCIUM SERPL-MCNC: 9 MG/DL (ref 8.3–10.4)
CHLORIDE SERPL-SCNC: 112 MMOL/L (ref 98–107)
CO2 SERPL-SCNC: 26 MMOL/L (ref 21–32)
CREAT SERPL-MCNC: 0.7 MG/DL (ref 0.8–1.5)
ERYTHROCYTE [DISTWIDTH] IN BLOOD BY AUTOMATED COUNT: 15.2 % (ref 11.9–14.6)
GLOBULIN SER CALC-MCNC: 3.9 G/DL (ref 2.3–3.5)
GLUCOSE BLD STRIP.AUTO-MCNC: 170 MG/DL (ref 65–100)
GLUCOSE BLD STRIP.AUTO-MCNC: 211 MG/DL (ref 65–100)
GLUCOSE BLD STRIP.AUTO-MCNC: 232 MG/DL (ref 65–100)
GLUCOSE BLD STRIP.AUTO-MCNC: 250 MG/DL (ref 65–100)
GLUCOSE SERPL-MCNC: 163 MG/DL (ref 65–100)
HAPTOGLOB SERPL-MCNC: 253 MG/DL (ref 30–200)
HCT VFR BLD AUTO: 34.8 % (ref 41.1–50.3)
HGB BLD-MCNC: 11.2 G/DL (ref 13.6–17.2)
MAGNESIUM SERPL-MCNC: 2.1 MG/DL (ref 1.8–2.4)
MCH RBC QN AUTO: 25.8 PG (ref 26.1–32.9)
MCHC RBC AUTO-ENTMCNC: 32.2 G/DL (ref 31.4–35)
MCV RBC AUTO: 80.2 FL (ref 79.6–97.8)
NRBC # BLD: 0 K/UL (ref 0–0.2)
PLATELET # BLD AUTO: 104 K/UL (ref 150–450)
PMV BLD AUTO: 11 FL (ref 9.4–12.3)
POTASSIUM SERPL-SCNC: 3.3 MMOL/L (ref 3.5–5.1)
PROT SERPL-MCNC: 6.2 G/DL (ref 6.3–8.2)
RBC # BLD AUTO: 4.34 M/UL (ref 4.23–5.6)
SERVICE CMNT-IMP: ABNORMAL
SODIUM SERPL-SCNC: 143 MMOL/L (ref 138–145)
WBC # BLD AUTO: 8.2 K/UL (ref 4.3–11.1)

## 2022-02-22 PROCEDURE — 65660000000 HC RM CCU STEPDOWN

## 2022-02-22 PROCEDURE — 82140 ASSAY OF AMMONIA: CPT

## 2022-02-22 PROCEDURE — 82962 GLUCOSE BLOOD TEST: CPT

## 2022-02-22 PROCEDURE — 36415 COLL VENOUS BLD VENIPUNCTURE: CPT

## 2022-02-22 PROCEDURE — 74011250637 HC RX REV CODE- 250/637: Performed by: INTERNAL MEDICINE

## 2022-02-22 PROCEDURE — 85027 COMPLETE CBC AUTOMATED: CPT

## 2022-02-22 PROCEDURE — 97535 SELF CARE MNGMENT TRAINING: CPT

## 2022-02-22 PROCEDURE — 97530 THERAPEUTIC ACTIVITIES: CPT

## 2022-02-22 PROCEDURE — 83735 ASSAY OF MAGNESIUM: CPT

## 2022-02-22 PROCEDURE — 74011000250 HC RX REV CODE- 250: Performed by: INTERNAL MEDICINE

## 2022-02-22 PROCEDURE — 80053 COMPREHEN METABOLIC PANEL: CPT

## 2022-02-22 PROCEDURE — 92526 ORAL FUNCTION THERAPY: CPT

## 2022-02-22 PROCEDURE — 74011250637 HC RX REV CODE- 250/637: Performed by: HOSPITALIST

## 2022-02-22 PROCEDURE — 97112 NEUROMUSCULAR REEDUCATION: CPT

## 2022-02-22 PROCEDURE — 74011636637 HC RX REV CODE- 636/637: Performed by: INTERNAL MEDICINE

## 2022-02-22 RX ORDER — POTASSIUM CHLORIDE 20 MEQ/1
40 TABLET, EXTENDED RELEASE ORAL
Status: COMPLETED | OUTPATIENT
Start: 2022-02-22 | End: 2022-02-22

## 2022-02-22 RX ORDER — INSULIN GLARGINE 100 [IU]/ML
28 INJECTION, SOLUTION SUBCUTANEOUS
Status: DISCONTINUED | OUTPATIENT
Start: 2022-02-22 | End: 2022-02-23 | Stop reason: HOSPADM

## 2022-02-22 RX ORDER — INSULIN LISPRO 100 [IU]/ML
5 INJECTION, SOLUTION INTRAVENOUS; SUBCUTANEOUS
Status: DISCONTINUED | OUTPATIENT
Start: 2022-02-22 | End: 2022-02-23 | Stop reason: HOSPADM

## 2022-02-22 RX ADMIN — INSULIN LISPRO 3 UNITS: 100 INJECTION, SOLUTION INTRAVENOUS; SUBCUTANEOUS at 08:37

## 2022-02-22 RX ADMIN — INSULIN LISPRO 6 UNITS: 100 INJECTION, SOLUTION INTRAVENOUS; SUBCUTANEOUS at 21:29

## 2022-02-22 RX ADMIN — INSULIN LISPRO 6 UNITS: 100 INJECTION, SOLUTION INTRAVENOUS; SUBCUTANEOUS at 17:39

## 2022-02-22 RX ADMIN — SODIUM CHLORIDE, PRESERVATIVE FREE 10 ML: 5 INJECTION INTRAVENOUS at 14:28

## 2022-02-22 RX ADMIN — OLANZAPINE 20 MG: 5 TABLET, ORALLY DISINTEGRATING ORAL at 21:29

## 2022-02-22 RX ADMIN — OXCARBAZEPINE 300 MG: 300 TABLET, FILM COATED ORAL at 21:29

## 2022-02-22 RX ADMIN — POTASSIUM CHLORIDE 40 MEQ: 20 TABLET, EXTENDED RELEASE ORAL at 08:37

## 2022-02-22 RX ADMIN — ACETAMINOPHEN 650 MG: 325 TABLET ORAL at 08:38

## 2022-02-22 RX ADMIN — INSULIN LISPRO 9 UNITS: 100 INJECTION, SOLUTION INTRAVENOUS; SUBCUTANEOUS at 12:27

## 2022-02-22 RX ADMIN — INSULIN LISPRO 5 UNITS: 100 INJECTION, SOLUTION INTRAVENOUS; SUBCUTANEOUS at 12:28

## 2022-02-22 RX ADMIN — INSULIN LISPRO 5 UNITS: 100 INJECTION, SOLUTION INTRAVENOUS; SUBCUTANEOUS at 08:37

## 2022-02-22 RX ADMIN — INSULIN GLARGINE 28 UNITS: 100 INJECTION, SOLUTION SUBCUTANEOUS at 21:29

## 2022-02-22 RX ADMIN — EMTRICITABINE AND TENOFOVIR DISOPROXIL FUMARATE 1 TABLET: 200; 300 TABLET, FILM COATED ORAL at 09:00

## 2022-02-22 RX ADMIN — BENZTROPINE MESYLATE 2 MG: 1 TABLET ORAL at 08:36

## 2022-02-22 RX ADMIN — OXCARBAZEPINE 300 MG: 300 TABLET, FILM COATED ORAL at 08:36

## 2022-02-22 RX ADMIN — ATORVASTATIN CALCIUM 10 MG: 10 TABLET, FILM COATED ORAL at 21:29

## 2022-02-22 RX ADMIN — SODIUM CHLORIDE, PRESERVATIVE FREE 10 ML: 5 INJECTION INTRAVENOUS at 21:29

## 2022-02-22 RX ADMIN — INSULIN LISPRO 5 UNITS: 100 INJECTION, SOLUTION INTRAVENOUS; SUBCUTANEOUS at 17:39

## 2022-02-22 RX ADMIN — BENZTROPINE MESYLATE 2 MG: 1 TABLET ORAL at 17:39

## 2022-02-22 RX ADMIN — SODIUM CHLORIDE, PRESERVATIVE FREE 10 ML: 5 INJECTION INTRAVENOUS at 05:56

## 2022-02-22 NOTE — PROGRESS NOTES
END OF SHIFT NOTE:    INTAKE/OUTPUT  02/20 0701 - 02/21 0700  In: 1324.9 [I.V.:1324.9]  Out: 1600 [Urine:1600]  Voiding: YES  Catheter: NO  Drain:              Flatus: Patient does have flatus present. Stool:  1 occurrences. Characteristics:  Stool Assessment  Stool Color: Brown  Stool Appearance: Soft  Stool Amount: Medium  Stool Source/Status: Rectum    Emesis: 0 occurrences. Characteristics:        VITAL SIGNS  Patient Vitals for the past 12 hrs:   Temp Pulse Resp BP SpO2   02/21/22 1937 100.2 °F (37.9 °C) 94 17 100/60 94 %   02/21/22 1549 99 °F (37.2 °C) 77  (!) 100/56 97 %   02/21/22 1141 98.5 °F (36.9 °C) 85 19 112/68 97 %   02/21/22 0850  86          Pain Assessment  Pain Intensity 1: 0 (02/21/22 1258)        Patient Stated Pain Goal: 0    Ambulating  No    Shift report given to oncoming nurse at the bedside.     Kartik Draper RN

## 2022-02-22 NOTE — PROGRESS NOTES
ACUTE PHYSICAL THERAPY GOALS:  (Developed with and agreed upon by patient and/or caregiver. )    LTG:  (1.)Mr. Catalan will move from supine to sit and sit to supine , scoot up and down and roll side to side in bed with MODIFIED INDEPENDENCE within 7 treatment day(s). (2.)Mr. Catalan will transfer from bed to chair and chair to bed with STAND BY ASSIST using the least restrictive device within 7 treatment day(s). (3.)Mr. Catalan will ambulate with CONTACT GUARD ASSIST for 100 feet with the least restrictive device within 7 treatment day(s). (4.)Mr. Catalan will participate in therapeutic activity/exercises x 25 minutes for increased strength within 7 treatment days. PHYSICAL THERAPY: Daily Note Treatment Day # 3    Monica Bey is a 46 y.o. male   PRIMARY DIAGNOSIS: DKA (diabetic ketoacidosis) ([facility]  DKA (diabetic ketoacidosis) ([facility] [E11.10]  Sepsis ([facility] [A41.9]         ASSESSMENT:     REHAB RECOMMENDATIONS: CURRENT LEVEL OF FUNCTION:  (Most Recently Demonstrated)   Recommendation to date pending progress:  Setting:   Short-term Rehab  Equipment:    To Be Determined Bed Mobility:   Standby Assistance   Sit to Stand:   Minimal Assistance x 2  Transfers:   Not tested  Gait/Mobility:   Minimal Assistance x 2     ASSESSMENT:  Mr. Ramona Chakraborty was very friendly and cooperative. He performed bed mobility with SBA and sat edge of bed several minutes, working in sitting balance while performing ADL with OT. He stood and ambulated in golden as below using rolling walker and min assist x 2. He has a narrow WERO and deviates to the L requiring cues to correct. He returned to room, then ambulated up to the sink and worked on standing balance. He has a posterior lean which requires cues and min assist to correct. He returned to chair and was left up with alarm on and needs in reach. He is making progress towards goals. Will continue with POC. SUBJECTIVE:   Mr. Ramona Chakraborty states \"Thank you. \"    SOCIAL HISTORY/ LIVING ENVIRONMENT:Lives alone and PLOF unknown given pt's AMS   Support Systems: Other Family Member(s)  OBJECTIVE:     PAIN: VITAL SIGNS: LINES/DRAINS:   Pre Treatment:  0  Post Treatment: 0   None  O2 Device: None (Room air)     MOBILITY: I Mod I S SBA CGA Min Mod Max Total  NT x2 Comments:   Bed Mobility    Rolling [] [] [] [x] [] [] [] [] [] [] []    Supine to Sit [] [] [] [x] [] [] [] [] [] [] []    Scooting [] [] [] [] [] [] [] [] [] [] []    Sit to Supine [] [] [] [] [] [] [] [] [] [] []    Transfers    Sit to Stand [] [] [] [] [] [x] [] [] [] [] [x]    Bed to Chair [] [] [] [] [] [] [] [] [] [] []    Stand to Sit [] [] [] [] [] [x] [] [] [] [] [x]    I=Independent, Mod I=Modified Independent, S=Supervision, SBA=Standby Assistance, CGA=Contact Guard Assistance,   Min=Minimal Assistance, Mod=Moderate Assistance, Max=Maximal Assistance, Total=Total Assistance, NT=Not Tested    BALANCE: Good Fair+ Fair Fair- Poor NT Comments   Sitting Static [] [x] [] [] [] []    Sitting Dynamic [] [x] [] [] [] []              Standing Static [] [] [x] [x] [] []    Standing Dynamic [] [] [x] [x] [] []      GAIT: I Mod I S SBA CGA Min Mod Max Total  NT x2 Comments:   Level of Assistance [] [] [] [] [] [x] [] [] [] [] [x]    Distance 75' x 3     DME Rolling Walker    Gait Quality Fluctuates, head down, deviates to the L,    narrow  WERO     Weightbearing  Status N/A     I=Independent, Mod I=Modified Independent, S=Supervision, SBA=Standby Assistance, CGA=Contact Guard Assistance,   Min=Minimal Assistance, Mod=Moderate Assistance, Max=Maximal Assistance, Total=Total Assistance, NT=Not Tested    PLAN:   FREQUENCY/DURATION: PT Plan of Care: 3 times/week for duration of hospital stay or until stated goals are met, whichever comes first.  TREATMENT:     TREATMENT:   ($$ Therapeutic Activity: 23-37 mins    )  Co-Treatment PT/OT necessary due to patient's decreased overall endurance/tolerance levels, as well as need for high level skilled assistance to complete functional transfers/mobility and functional tasks  Therapeutic Activity (23 Minutes): Therapeutic activity included Supine to Sit, Scooting, Transfer Training, Ambulation on level ground, Sitting balance  and Standing balance to improve functional Mobility, Strength, Activity tolerance and balance.     TREATMENT GRID:  N/A    AFTER TREATMENT POSITION/PRECAUTIONS:  Alarm Activated, Chair, Needs within reach and RN notified    INTERDISCIPLINARY COLLABORATION:  RN/PCT, PT/PTA and OT/OTTO    TOTAL TREATMENT DURATION:  PT Patient Time In/Time Out  Time In: 1333  Time Out: 2463 [facility]30, PTA

## 2022-02-22 NOTE — PROGRESS NOTES
Hospitalist Progress Note   Admit Date:  2022 12:25 PM   Name:  Nurys    Age:  46 y.o. Sex:  male  :  1970   MRN:  [id number]   Room:      Presenting Complaint: Vomiting    Reason(s) for Admission: DKA (diabetic ketoacidosis) ([facility] [E11.10]  Sepsis [facility]) [A41.9]     Hospital Course & Interval History:   58-year-old male with a past medical history of hypertension, HIV, schizophrenia, insulin-dependent diabetes mellitus, presented in the setting of altered mental status. Most of the history obtained by brother at bedside since patient unable to provide any details. According to the brother and caregiver he has been presenting with worsening mentation for the last few days, the got worse over time so they decided to bring him to the emergency department. Otherwise they have not seen any coughing or complaining of chest pain also no evidence of abdominal pain nausea vomiting or diarrhea. In the emergency department the patient was found to be tachycardic, with leukocytosis and a blood sugar of 1410. He was a started on insulin drip. He was given IV fluids and IV antibiotics. Patient has not been letting the care aides in to his house per his mothers report. Subjective/24hr Events (22): Patient more alert per report. Still slow with responses and disoriented. Denies pain. Speech has seen and cleared for pureed diet. ROS:  10 systems reviewed and negative except as noted above.      Assessment & Plan:   DKA (diabetic ketoacidosis) ([facility] (2022)  Initially started on insulin drip with glucose stabilizer; currently off insulin drip due to resolution of anion gap  Continue to monitor blood glucose  Continue to monitor electrolytes closely and replete accordingly  A1c greater than 16  Diabetic educator pending    increase Lantus to 28 units, add prandial humalog plus SSI    Concern for sepsis(tachycardia, leukocytosis) of unclear etiology  Pro-Richi 0.16  UA negative  Lactic acid 1 8  Chest x-ray without pneumonia  Wound culture positive for light staph aureus  2/22  - monitor off of IVF, s/p vanco/zosyn x 5 day. Monitor off atbx    Acute metabolic encephalopathy  CT of the brain without any acute intracranial abnormalities  Patient has a history of schizophrenia not taking his medications  Continue to monitor mental status  Avoid sedatives  Delirium precautions  Psychiatry consulted  Currently off Precedex, continue to monitor  - psych has seen    2/22 - stable on cogentin, cont zpyrexa qhs, trileptal bid. Haldol qhs prn     Hypernatremia  2/21 - corrected    Hyperlipidemia  Continue home medication    Hypertension  Hypertensives on hold at this point  Central line placed due to hypotension    Schizophrenia  We will continue patient's home medications    Psychiatry consulted, appreciate recommendations    Thrombocytopenia  - hold lovenox, send HIT panel  - add SCDs  2/22 - HIT panel pending, plts 104 trending up      Dispo/Discharge Planning:    PT recommending STR. CM aware. Medically stable at this time.      Diet:  ADULT DIET Dysphagia - Minced & Moist; 5 carb choices (75 gm/meal)  DVT PPx: Lovenox  Code status: Full Code    Hospital Problems as of 2/22/2022 Never Reviewed          Codes Class Noted - Resolved POA    Thrombocytopenia ([facility] ICD-10-CM: D69.6  ICD-9-CM: 287.5  2/21/2022 - Present Unknown        * (Principal) DKA (diabetic ketoacidosis) ([facility] ICD-10-CM: E11.10  ICD-9-CM: 250.12  2/16/2022 - Present Unknown        Sepsis ([facility] ICD-10-CM: A41.9  ICD-9-CM: 038.9, 995.91  2/16/2022 - Present Unknown        Schizophrenia ([facility] ICD-10-CM: F20.9  ICD-9-CM: 295.90  3/15/2020 - Present Yes        HIV (human immunodeficiency virus infection) ([facility] ICD-10-CM: B20  ICD-9-CM: V08  6/4/2014 - Present Yes              Objective:     Patient Vitals for the past 24 hrs:   Temp Pulse Resp BP SpO2   02/22/22 1502 97.9 °F (36.6 °C) 79 16 107/67 97 %   02/22/22 1106 99 °F (37.2 °C) 87 16 109/70 97 %   02/22/22 0732 99.6 °F (37.6 °C) 86 16 119/70 100 %   02/22/22 0400  80      02/22/22 0329 99.6 °F (37.6 °C) (!) 59 17 106/64 96 %   02/22/22 0000 99 °F (37.2 °C) 79 16 119/77 97 %   02/21/22 2000  90      02/21/22 1937 100.2 °F (37.9 °C) 94 17 100/60 94 %     Oxygen Therapy  O2 Sat (%): 97 % (02/22/22 1502)  Pulse via Oximetry: 81 beats per minute (02/20/22 1515)  O2 Device: None (Room air) (02/22/22 1230)  Skin Assessment: Clean, dry, & intact (02/19/22 1900)  Skin Protection for O2 Device: N/A (02/19/22 1900)  O2 Flow Rate (L/min): 3 l/min (02/20/22 0700)    Estimated body mass index is 22.13 kg/m² as calculated from the following:    Height as of 2/13/22: 5' 5\" (1.651 m). Weight as of this encounter: 60.3 kg (133 lb). Intake/Output Summary (Last 24 hours) at 2/22/2022 1555  Last data filed at 2/22/2022 1432  Gross per 24 hour   Intake 812.42 ml   Output 253 ml   Net 559.42 ml         Physical Exam:   Blood pressure 107/67, pulse 79, temperature 97.9 °F (36.6 °C), resp. rate 16, weight 60.3 kg (133 lb), SpO2 97 %. General:    Much more alert  Head:  Normocephalic, atraumatic  Eyes:  Sclerae appear normal.  Pupils equally round. ENT:  Nares appear normal, no drainage. Moist oral mucosa  Neck:  No restricted ROM. Trachea midline   CV:   RRR. No m/r/g. No jugular venous distension. Lungs:   CTAB. No wheezing, rhonchi, or rales. Respirations even, unlabored  Abdomen: Bowel sounds present. Soft, nontender, nondistended. Extremities: No cyanosis or clubbing. No edema  Skin:     No rashes and normal coloration. Warm and dry. Neuro:  CN II-XII grossly intact. Sensation intact.    Psych:  Awake alert    I have reviewed ordered lab tests and independently visualized imaging below:    Recent Labs:  Recent Results (from the past 48 hour(s))   GLUCOSE, POC    Collection Time: 02/20/22  4:28 PM   Result Value Ref Range    Glucose (POC) 208 (H) 65 - 100 mg/dL    Performed by Reinaldo    GLUCOSE, POC    Collection Time: 02/20/22  8:48 PM   Result Value Ref Range    Glucose (POC) 202 (H) 65 - 100 mg/dL    Performed by Virgil    GLUCOSE, POC    Collection Time: 02/21/22  1:05 AM   Result Value Ref Range    Glucose (POC) 201 (H) 65 - 100 mg/dL    Performed by Nickolas Horn    GLUCOSE, POC    Collection Time: 02/21/22  4:37 AM   Result Value Ref Range    Glucose (POC) 189 (H) 65 - 100 mg/dL    Performed by Waqas    CBC W/O DIFF    Collection Time: 02/21/22  5:33 AM   Result Value Ref Range    WBC 7.1 4.3 - 11.1 K/uL    RBC 4.64 4.23 - 5.6 M/uL    HGB 11.9 (L) 13.6 - 17.2 g/dL    HCT 37.0 (L) 41.1 - 50.3 %    MCV 79.7 79.6 - 97.8 FL    MCH 25.6 (L) 26.1 - 32.9 PG    MCHC 32.2 31.4 - 35.0 g/dL    RDW 15.3 (H) 11.9 - 14.6 %    PLATELET 84 (L) 572 - 450 K/uL    MPV 11.4 9.4 - 12.3 FL    ABSOLUTE NRBC 0.00 0.0 - 0.2 K/uL   METABOLIC PANEL, COMPREHENSIVE    Collection Time: 02/21/22  5:33 AM   Result Value Ref Range    Sodium 144 136 - 145 mmol/L    Potassium 3.3 (L) 3.5 - 5.1 mmol/L    Chloride 112 (H) 98 - 107 mmol/L    CO2 24 21 - 32 mmol/L    Anion gap 8 7 - 16 mmol/L    Glucose 192 (H) 65 - 100 mg/dL    BUN 9 6 - 23 MG/DL    Creatinine 0.80 0.8 - 1.5 MG/DL    GFR est AA >60 >60 ml/min/1.73m2    GFR est non-AA >60 >60 ml/min/1.73m2    Calcium 8.7 8.3 - 10.4 MG/DL    Bilirubin, total 0.7 0.2 - 1.1 MG/DL    ALT (SGPT) 44 12 - 65 U/L    AST (SGOT) 37 15 - 37 U/L    Alk.  phosphatase 65 50 - 136 U/L    Protein, total 6.0 (L) 6.3 - 8.2 g/dL    Albumin 2.5 (L) 3.5 - 5.0 g/dL    Globulin 3.5 2.3 - 3.5 g/dL    A-G Ratio 0.7 (L) 1.2 - 3.5     MAGNESIUM    Collection Time: 02/21/22  5:33 AM   Result Value Ref Range    Magnesium 2.0 1.8 - 2.4 mg/dL   GLUCOSE, POC    Collection Time: 02/21/22 11:43 AM   Result Value Ref Range    Glucose (POC) 229 (H) 65 - 100 mg/dL    Performed by Reina Landeros    FIBRINOGEN    Collection Time: 02/21/22 4:41 PM   Result Value Ref Range    Fibrinogen 672 (H) 190 - 501 mg/dL   PROTHROMBIN TIME + INR    Collection Time: 02/21/22  4:41 PM   Result Value Ref Range    Prothrombin time 13.6 12.6 - 14.5 sec    INR 1.0     LD    Collection Time: 02/21/22  4:41 PM   Result Value Ref Range     100 - 190 U/L   HAPTOGLOBIN    Collection Time: 02/21/22  4:41 PM   Result Value Ref Range    Haptoglobin 253 (H) 30 - 200 mg/dL   GLUCOSE, POC    Collection Time: 02/21/22  5:43 PM   Result Value Ref Range    Glucose (POC) 308 (H) 65 - 100 mg/dL    Performed by Erwin Chaudhry    GLUCOSE, POC    Collection Time: 02/21/22  8:28 PM   Result Value Ref Range    Glucose (POC) 202 (H) 65 - 100 mg/dL    Performed by Jhony    CBC W/O DIFF    Collection Time: 02/22/22  6:45 AM   Result Value Ref Range    WBC 8.2 4.3 - 11.1 K/uL    RBC 4.34 4.23 - 5.6 M/uL    HGB 11.2 (L) 13.6 - 17.2 g/dL    HCT 34.8 (L) 41.1 - 50.3 %    MCV 80.2 79.6 - 97.8 FL    MCH 25.8 (L) 26.1 - 32.9 PG    MCHC 32.2 31.4 - 35.0 g/dL    RDW 15.2 (H) 11.9 - 14.6 %    PLATELET 600 (L) 790 - 450 K/uL    MPV 11.0 9.4 - 12.3 FL    ABSOLUTE NRBC 0.00 0.0 - 0.2 K/uL   METABOLIC PANEL, COMPREHENSIVE    Collection Time: 02/22/22  6:45 AM   Result Value Ref Range    Sodium 143 138 - 145 mmol/L    Potassium 3.3 (L) 3.5 - 5.1 mmol/L    Chloride 112 (H) 98 - 107 mmol/L    CO2 26 21 - 32 mmol/L    Anion gap 5 (L) 7 - 16 mmol/L    Glucose 163 (H) 65 - 100 mg/dL    BUN 9 6 - 23 MG/DL    Creatinine 0.70 (L) 0.8 - 1.5 MG/DL    GFR est AA >60 >60 ml/min/1.73m2    GFR est non-AA >60 >60 ml/min/1.73m2    Calcium 9.0 8.3 - 10.4 MG/DL    Bilirubin, total 0.5 0.2 - 1.1 MG/DL    ALT (SGPT) 38 12 - 65 U/L    AST (SGOT) 27 15 - 37 U/L    Alk.  phosphatase 66 50 - 136 U/L    Protein, total 6.2 (L) 6.3 - 8.2 g/dL    Albumin 2.3 (L) 3.5 - 5.0 g/dL    Globulin 3.9 (H) 2.3 - 3.5 g/dL    A-G Ratio 0.6 (L) 1.2 - 3.5     MAGNESIUM    Collection Time: 02/22/22  6:45 AM   Result Value Ref Range    Magnesium 2.1 1.8 - 2.4 mg/dL   AMMONIA    Collection Time: 02/22/22  6:45 AM   Result Value Ref Range    Ammonia 29 11 - 32 UMOL/L   GLUCOSE, POC    Collection Time: 02/22/22  7:43 AM   Result Value Ref Range    Glucose (POC) 170 (H) 65 - 100 mg/dL    Performed by CombsCourtneyPCA    GLUCOSE, POC    Collection Time: 02/22/22 11:37 AM   Result Value Ref Range    Glucose (POC) 250 (H) 65 - 100 mg/dL    Performed by CombsCourtneyPCA        All Micro Results     Procedure Component Value Units Date/Time    CULTURE, BLOOD [[id number]] Collected: 02/16/22 1314    Order Status: Completed Specimen: Blood Updated: 02/21/22 0720     Special Requests: --        LEFT  Antecubital       Culture result: NO GROWTH 5 DAYS       CULTURE, BLOOD [[id number]] Collected: 02/16/22 1331    Order Status: Completed Specimen: Blood Updated: 02/21/22 0720     Special Requests: --        RIGHT  Antecubital       Culture result: NO GROWTH 5 DAYS       COVID-19 RAPID TEST [[id number]] Collected: 02/16/22 1314    Order Status: Completed Specimen: Nasopharyngeal Updated: 02/16/22 1350     Specimen source Nasopharyngeal        COVID-19 rapid test Not detected        Comment:      The specimen is NEGATIVE for SARS-CoV-2, the novel coronavirus associated with COVID-19. A negative result does not rule out COVID-19. This test has been authorized by the FDA under an Emergency Use Authorization (EUA) for use by authorized laboratories. Fact sheet for Healthcare Providers: ConventionUpdate.co.nz  Fact sheet for Patients: ConventionUpdate.co.nz       Methodology: Isothermal Nucleic Acid Amplification               Other Studies:  No results found.     Current Meds:  Current Facility-Administered Medications   Medication Dose Route Frequency    insulin glargine (LANTUS) injection 28 Units  28 Units SubCUTAneous QHS    insulin lispro (HUMALOG) injection 5 Units  5 Units SubCUTAneous TIDAC    benztropine (COGENTIN) tablet 2 mg  2 mg Oral BID    haloperidoL (HALDOL) tablet 5 mg  5 mg Oral QHS PRN    insulin lispro (HUMALOG) injection   SubCUTAneous AC&HS    OLANZapine (ZyPREXA zydis) disintegrating tablet 20 mg  20 mg Oral QHS    LORazepam (ATIVAN) injection 1 mg  1 mg IntraVENous Q2H PRN    dextrose 40% (GLUTOSE) oral gel 1 Tube  15 g Oral PRN    glucagon (GLUCAGEN) injection 1 mg  1 mg IntraMUSCular PRN    sodium chloride (NS) flush 5-40 mL  5-40 mL IntraVENous Q8H    sodium chloride (NS) flush 5-40 mL  5-40 mL IntraVENous PRN    acetaminophen (TYLENOL) tablet 650 mg  650 mg Oral Q6H PRN    Or    acetaminophen (TYLENOL) suppository 650 mg  650 mg Rectal Q6H PRN    polyethylene glycol (MIRALAX) packet 17 g  17 g Oral DAILY PRN    ondansetron (ZOFRAN ODT) tablet 4 mg  4 mg Oral Q8H PRN    Or    ondansetron (ZOFRAN) injection 4 mg  4 mg IntraVENous Q6H PRN    [Held by provider] enoxaparin (LOVENOX) injection 40 mg  40 mg SubCUTAneous Q24H    albuterol (PROVENTIL VENTOLIN) nebulizer solution 2.5 mg  2.5 mg Nebulization Q6H PRN    atorvastatin (LIPITOR) tablet 10 mg  10 mg Oral QHS    emtricitabine-tenofovir (TDF) (TRUVADA) 200-300 mg per tablet 1 Tablet  1 Tablet Oral DAILY    OXcarbazepine (TRILEPTAL) tablet 300 mg  300 mg Oral Q12H    NUTRITIONAL SUPPORT ELECTROLYTE PRN ORDERS   Does Not Apply PRN       Signed:  Antoni Lerma, DO    Part of this note may have been written by using a voice dictation software. The note has been proof read but may still contain some grammatical/other typographical errors.

## 2022-02-22 NOTE — PROGRESS NOTES
[facility] CRITICAL CARE OUTREACH NURSE PROGRESS REPORT      SUBJECTIVE: Called to assess patient secondary to follow up visit. MEWS Score: 1 (02/22/22 0000)  Vitals:    02/21/22 1549 02/21/22 1937 02/21/22 2000 02/22/22 0000   BP: (!) 100/56 100/60  119/77   Pulse: 77 94 90 79   Resp:  17  16   Temp: 99 °F (37.2 °C) 100.2 °F (37.9 °C)  99 °F (37.2 °C)   SpO2: 97% 94%  97%   Weight:            LAB DATA:    Recent Labs     02/21/22  0533 02/20/22  1449 02/20/22 0254 02/19/22  0801 02/19/22  0801 02/19/22 0253 02/19/22 0253     --  148*  --  151*   < > 154*   K 3.3* 3.5 2.8*   < > 3.5   < > 3.3*   *  --  117*  --  124*   < > 123*   CO2 24  --  26  --  29   < > 28   AGAP 8  --  5*  --  Cannot be calculated   < > 3*   *  --  197*  --  150*   < > 270*   BUN 9  --  15  --  21   < > 23   CREA 0.80  --  0.70*  --  1.00   < > 1.20   GFRAA >60  --  >60  --  >60   < > >60   GFRNA >60  --  >60  --  >60   < > >60   CA 8.7  --  8.0*  --  8.7   < > 8.6   MG 2.0  --  1.9  --  2.2   < > 2.1   ALB 2.5*  --  2.3*  --   --   --  2.5*   TP 6.0*  --  5.4*  --   --   --  5.9*   GLOB 3.5  --  3.1  --   --   --  3.4   AGRAT 0.7*  --  0.7*  --   --   --  0.7*   ALT 44  --  48  --   --   --  57    < > = values in this interval not displayed. Recent Labs     02/21/22 0533 02/20/22 0254 02/19/22  0801   WBC 7.1 7.2 10.9   HGB 11.9* 11.4* 12.0*   HCT 37.0* 36.4* 39.1*   PLT 84* 80* 94*          OBJECTIVE: On arrival to room, I found patient to be asleep. Pain Assessment  Pain Intensity 1: 0 (02/21/22 1258)        Patient Stated Pain Goal: 0                                 ASSESSMENT:  Patient asleep on assessment. Patient resting on RA. No acute distress noted. RN notified of visit. PLAN:  Will continue to follow up per outreach protocol.

## 2022-02-22 NOTE — PROGRESS NOTES
END OF SHIFT NOTE:    INTAKE/OUTPUT  02/21 0701 - 02/22 0700  In: 1158.5 [I.V.:1158.5]  Out: 3 [Urine:2]  Voiding: YES  Catheter: NO  Drain:              Flatus: Patient does have flatus present. Stool:  1 occurrences. Characteristics:  Stool Assessment  Stool Color: Brown  Stool Appearance: Soft  Stool Amount: Medium  Stool Source/Status: Rectum    Emesis: 0 occurrences. Characteristics:        VITAL SIGNS  Patient Vitals for the past 12 hrs:   Temp Pulse Resp BP SpO2   02/22/22 0329 99.6 °F (37.6 °C) (!) 59 17 106/64 96 %   02/22/22 0000 99 °F (37.2 °C) 79 16 119/77 97 %   02/21/22 2000  90      02/21/22 1937 100.2 °F (37.9 °C) 94 17 100/60 94 %       Pain Assessment  Pain Intensity 1: 0 (pt denies pain) (02/22/22 0243)        Patient Stated Pain Goal: 0    Ambulating  No    Shift report given to oncoming nurse at the bedside.     Kya Kendall, RN

## 2022-02-22 NOTE — DIABETES MGMT
Patient admitted with DKA. Blood glucose ranged 189-308 yesterday with patient receiving Lantus 25 units and Humalog 33 units. Blood glucose this morning was 170. Reviewed patient current regimen: Lantus 25 units nightly and Humalog correctional insulin resistant scale. Patient has dysphagia diet with 5 carb choices ordered. Patient would likely benefit from an increase in basal insulin to help improve fasting glucose. Patient would also likely benefit from prandial insulin with normal sensitivity correctional insulin to help offset hyperglycemia during the day related to caloric intake. Provider updated via WAYN regarding recommendations and patient glycemic control. Per chart review patient with altered mental status. CM following for STR placement per. Patient not appropriate for diabetes education at this time.

## 2022-02-22 NOTE — PROGRESS NOTES
ACUTE OT GOALS:  (Developed with and agreed upon by patient and/or caregiver.)  1. Patient will complete lower body bathing and dressing with min A and adaptive equipment as needed. 2. Patient will complete toileting with min A.   3. Patient will tolerate 30 minutes of OT treatment with 1-2 rest breaks to increase activity tolerance for ADLs. 4. Patient will complete functional transfers with min A and adaptive equipment as needed. 5. Patient will complete functional mobility for household distances with CGA and adaptive equipment as needed. 6. Patient will complete functional activity while seated EOB with CGA and adaptive equipment as needed. 7. Patient will follow 100% of commands with min verbal cues from therapist to increase participation in ADLs.    Timeframe: 7 visits        OCCUPATIONAL THERAPY: Daily Note and PM OT Treatment Day # 2    Ynes Delgado is a 46 y.o. male   PRIMARY DIAGNOSIS: DKA (diabetic ketoacidosis) ([facility] [address]  DKA (diabetic ketoacidosis) ([facility] [address] [E11.10]  Sepsis ([facility] [address] [A41.9]       Payor: [address] [place] [address],D / Plan: Jr Rodriguez / Product Type: [employer] /   ASSESSMENT:     REHAB RECOMMENDATIONS: CURRENT LEVEL OF FUNCTION:  (Most Recently Demonstrated)   Recommendation to date pending progress:  Setting:   Short-term Rehab  Equipment:    To Be Determined Bathing:   Not tested  Dressing:   Mod A sock management. Feeding/Grooming:   Minimal Assistance standing balance at sinkside. Toileting:   Not tested  Functional Mobility:   Minimal Assistance x 2 x RW     ASSESSMENT:  Mr. Genny Ventura continues to present with deficits in overall strength, balance, and activity tolerance for performance of ADLs and functional mobility but does make minimal progress towards acute care OT goals this treatment session. Requires SBA and additional time to complete supine <> sit. Requires Mod A to complete sock management.  Requires Min A x RW to complete sit <> stand. Requires Min A x 2 (with assist needed for walker management) to complete hallway mobility to increase activity tolerance for household distances. Of note, pt with poor walker management and needs physical cueing to avoid bumping into things in hallway. Following seated rest break, pt requires Min A x RW to complete sit <> stand and Min A x 2 x RW to walk from chair <> sink in preparation for grooming ADL. Requires Min A for standing balance due to posterior lean to brush teeth. Requires Min A x 2 x RW to take steps backwards to return to recliner chair and complete stand <> sit. Pt would benefit from continued skilled OT to increase independence and safety for performance of ADLs and functional mobility. SUBJECTIVE:   Mr. Jossie Gilbert states, \"I think that's good for today. \" When asked if he would like to complete additional ADL. SOCIAL HISTORY/LIVING ENVIRONMENT: Lives alone and has a sister who lives nearby per RN however no other PLOF. Support Systems: Other Family Member(s)    OBJECTIVE:     PAIN: VITAL SIGNS: LINES/DRAINS:   Pre Treatment: Pain Screen  Pain Scale 1: Numeric (0 - 10)  Pain Intensity 1: 0  Post Treatment: Unchanged   None  O2 Device: None (Room air)     ACTIVITIES OF DAILY LIVING: I Mod I S SBA CGA Min Mod Max Total NT Comments   BASIC ADLs:              Bathing/ Showering [] [] [] [] [] [] [] [] [] [x]    Toileting [] [] [] [] [] [] [] [] [] [x]    Dressing [] [] [] [] [] [] [x] [] [] [] Sock management. Feeding [] [] [] [] [] [] [] [] [] [x]    Grooming [] [] [] [] [] [x] [] [] [] [] Brush teeth standing at sinkside.     Personal Device Care [] [] [] [] [] [] [] [] [] [x]    Functional Mobility [] [] [] [] [] [x] [] [] [] [] Assist x 2 x RW   I=Independent, Mod I=Modified Independent, S=Supervision, SBA=Standby Assistance, CGA=Contact Guard Assistance,   Min=Minimal Assistance, Mod=Moderate Assistance, Max=Maximal Assistance, Total=Total Assistance, NT=Not Tested    MOBILITY: I Mod I S SBA CGA Min Mod Max Total  NT x2 Comments:   Supine to sit [] [] [] [x] [] [] [] [] [] [] [] Additional time. Sit to supine [] [] [] [] [] [] [] [] [] [x] []    Sit to stand [] [] [] [] [] [x] [] [] [] [] [] X RW   Bed to chair [] [] [] [] [] [x] [] [] [] [] [x] X RW   I=Independent, Mod I=Modified Independent, S=Supervision, SBA=Standby Assistance, CGA=Contact Guard Assistance,   Min=Minimal Assistance, Mod=Moderate Assistance, Max=Maximal Assistance, Total=Total Assistance, NT=Not Tested    BALANCE: Good Fair+ Fair Fair- Poor NT Comments   Sitting Static [] [x] [] [] [] []    Sitting Dynamic [] [] [x] [] [] []              Standing Static [] [] [] [x] [] []    Standing Dynamic [] [] [] [] [x] [] Min A x 2 in room and hallway mobility. PLAN:   FREQUENCY/DURATION: OT Plan of Care: 3 times/week for duration of hospital stay or until stated goals are met, whichever comes first.    TREATMENT:   TREATMENT:   ($$ Self Care/Home Management: 8-22 mins$$ Neuromuscular Re-Education: 8-22 mins   )  Co-Treatment PT/OT necessary due to patient's decreased overall endurance/tolerance levels, as well as need for high level skilled assistance to complete functional transfers/mobility and functional tasks  Self Care (8 Minutes): Self care including Lower Body Dressing, Grooming and Energy Conservation Training to increase independence, decrease level of assistance required and increase activity tolerance. .  Neuromuscular Re-education (15 Minutes): Neuromuscular Re-education included Balance Training, Coordination training, Postural training, Sitting balance training and Standing balance training to improve Balance, Coordination and Postural Control.     TREATMENT GRID:  N/A    AFTER TREATMENT POSITION/PRECAUTIONS:  Alarm Activated, Chair, Needs within reach and RN notified    INTERDISCIPLINARY COLLABORATION:  RN/PCT, PT/PTA and OT/OTTO    TOTAL TREATMENT DURATION:  OT Patient Time In/Time Out  Time In: 1333  Time Out: [address], OTR/L

## 2022-02-22 NOTE — PROGRESS NOTES
LTG: Patient will tolerate least restrictive diet without overt signs or symptoms of airway compromise. MET 2/22  STG: Patient will tolerate pureed diet and thin liquids without overt signs or symptoms of airway compromise. MET 2/22  STG: patient will tolerate ongoing trials in efforts to advance diet. MET 2/22  STG: Patient will participate in modified barium swallow study as clinically indicated. Discontinued 2/22  SPEECH LANGUAGE PATHOLOGY: DYSPHAGIA- Daily Note and Discharge    NAME/AGE/GENDER: Bandar Montelongo is a 46 y.o. male  DATE: 2/22/2022  PRIMARY DIAGNOSIS: DKA [facility]diabetic [facility]acidosis) ([facility] [E11.10]  Sepsis ([facility] [A41.9]      ICD-10: Treatment Diagnosis: R13.12 Dysphagia, Oropharyngeal Phase    RECOMMENDATIONS   DIET:    Minced and Moist   Thin Liquids    MEDICATIONS: Crushed in applesauce     PRECAUTIONS, MODIFICATIONS, AND STRATEGIES  · Slow rate of intake  · Small bites/sips  · Upright at 90 degrees during meal  · Oral care every 3 hours   · Assistance with intake       RECOMMENDATIONS FOR CONTINUED SPEECH THERAPY:   No further speech therapy indicated at this time. ASSESSMENT   Patient presents with oral dysphagia with chewables due to missing upper partials. Reports partials at home. No overt s/sx pharyngeal dysphagia. Recommend advance to minced/moist diet and thin liquids. No further dysphagia treatment indicated. EDUCATION: Recommendations discussed with Patient, RN    REHABILITATION POTENTIAL FOR STATED GOALS: Good    PLAN    FREQUENCY/DURATION:   No further speech therapy indicated at this time as oropharyngeal swallow function is within normal limits. CONTINUATION OF SKILLED SERVICES/MEDICAL NECESSITY:   No additional speech services warranted. SUBJECTIVE   Upright in bed, alert and Pleasant. \"I'm gonna miss yall! \"     Oxygen Device: room air  Pain: Pain Scale 1: Numeric (0 - 10)  Pain Intensity 1: 0    History of Present Injury/Illness: Mr. Chuck Morales  has a past medical history of Autoimmune disease ([facility] [address] Diabetes ([facility] [address] Hypertension, Infectious disease, Paranoid schizophrenia ([facility] [address] Psychiatric disorder, and Seizures ([facility] [address] . He also  has a past surgical history that includes pr abdomen surgery proc unlisted. PRECAUTIONS/ALLERGIES: Thorazine [chlorpromazine]     Problem List:  (Impairments causing functional limitations):  1. Oral dysphagia    Orientation:  Person  Place  Time        OBJECTIVE   Dysphagia treatment:   Patient seen for diet tolerance and po trials. Tolerate thin liquids(cup/straw), puree, minced/moist, and mixed fruit. Declined coarse solid due to missing dentition. Incomplete prep, with patient swallowing unchewed whole solid pieces of fruit due to missing dentition. With cues to chew thoroughly, increased prep/mashing pattern and reports of difficulty breaking down bolus. Adequate prep/clearance with puree and minced/moist.   No s/sx pharyngeal dysphagia. Tool Used: Dysphagia Outcome and Severity Scale (SULEMA)    Score Comments   Normal Diet  [] 7 With no strategies or extra time needed   Functional Swallow  [] 6 May have mild oral or pharyngeal delay   Mild Dysphagia  [] 5 Which may require one diet consistency restricted    Mild-Moderate Dysphagia  [] 4 With 1-2 diet consistencies restricted   Moderate Dysphagia  [] 3 With 2 or more diet consistencies restricted   Moderate-Severe Dysphagia  [] 2 With partial PO strategies (trials with ST only)   Severe Dysphagia  [] 1 With inability to tolerate any PO safely      Score:  Initial: 3 Most Recent: 4 (Date 02/22/22 )   Interpretation of Tool: The Dysphagia Outcome and Severity Scale (SULEMA) is a simple, easy-to-use, 7-point scale developed to systematically rate the functional severity of dysphagia based on objective assessment and make recommendations for diet level, independence level, and type of nutrition.        INTERDISCIPLINARY COLLABORATION: RN    After treatment position/precautions:  · Upright in bed    Total Treatment Duration:   Time In: 1127  Time Out: 125 Kaycee Em, [facility], [facility] TEJINDER BRADSHAW, CCC-SLP

## 2022-02-23 VITALS
BODY MASS INDEX: 22.13 KG/M2 | WEIGHT: 133 LBS | DIASTOLIC BLOOD PRESSURE: 71 MMHG | HEART RATE: 67 BPM | OXYGEN SATURATION: 98 % | RESPIRATION RATE: 16 BRPM | SYSTOLIC BLOOD PRESSURE: 107 MMHG | TEMPERATURE: 98.8 F

## 2022-02-23 LAB
ALBUMIN SERPL-MCNC: 2.2 G/DL (ref 3.5–5)
ALBUMIN/GLOB SERPL: 0.6 {RATIO} (ref 1.2–3.5)
ALP SERPL-CCNC: 68 U/L (ref 50–136)
ALT SERPL-CCNC: 32 U/L (ref 12–65)
ANION GAP SERPL CALC-SCNC: 7 MMOL/L (ref 7–16)
AST SERPL-CCNC: 19 U/L (ref 15–37)
BILIRUB SERPL-MCNC: 0.4 MG/DL (ref 0.2–1.1)
BUN SERPL-MCNC: 9 MG/DL (ref 6–23)
CALCIUM SERPL-MCNC: 8.9 MG/DL (ref 8.3–10.4)
CHLORIDE SERPL-SCNC: 111 MMOL/L (ref 98–107)
CO2 SERPL-SCNC: 26 MMOL/L (ref 21–32)
COVID-19 RAPID TEST, COVR: NOT DETECTED
CREAT SERPL-MCNC: 0.7 MG/DL (ref 0.8–1.5)
ERYTHROCYTE [DISTWIDTH] IN BLOOD BY AUTOMATED COUNT: 15.4 % (ref 11.9–14.6)
GLOBULIN SER CALC-MCNC: 3.9 G/DL (ref 2.3–3.5)
GLUCOSE BLD STRIP.AUTO-MCNC: 164 MG/DL (ref 65–100)
GLUCOSE BLD STRIP.AUTO-MCNC: 167 MG/DL (ref 65–100)
GLUCOSE SERPL-MCNC: 162 MG/DL (ref 65–100)
HCT VFR BLD AUTO: 33.4 % (ref 41.1–50.3)
HGB BLD-MCNC: 10.7 G/DL (ref 13.6–17.2)
MAGNESIUM SERPL-MCNC: 2.2 MG/DL (ref 1.8–2.4)
MCH RBC QN AUTO: 25.7 PG (ref 26.1–32.9)
MCHC RBC AUTO-ENTMCNC: 32 G/DL (ref 31.4–35)
MCV RBC AUTO: 80.3 FL (ref 79.6–97.8)
NRBC # BLD: 0 K/UL (ref 0–0.2)
PLATELET # BLD AUTO: 143 K/UL (ref 150–450)
PMV BLD AUTO: 10.4 FL (ref 9.4–12.3)
POTASSIUM SERPL-SCNC: 3.2 MMOL/L (ref 3.5–5.1)
PROT SERPL-MCNC: 6.1 G/DL (ref 6.3–8.2)
RBC # BLD AUTO: 4.16 M/UL (ref 4.23–5.6)
SERVICE CMNT-IMP: ABNORMAL
SERVICE CMNT-IMP: ABNORMAL
SODIUM SERPL-SCNC: 144 MMOL/L (ref 136–145)
SOURCE, COVRS: NORMAL
WBC # BLD AUTO: 7.1 K/UL (ref 4.3–11.1)

## 2022-02-23 PROCEDURE — 74011250637 HC RX REV CODE- 250/637: Performed by: INTERNAL MEDICINE

## 2022-02-23 PROCEDURE — 74011636637 HC RX REV CODE- 636/637: Performed by: INTERNAL MEDICINE

## 2022-02-23 PROCEDURE — 74011000250 HC RX REV CODE- 250: Performed by: INTERNAL MEDICINE

## 2022-02-23 PROCEDURE — 80053 COMPREHEN METABOLIC PANEL: CPT

## 2022-02-23 PROCEDURE — 36415 COLL VENOUS BLD VENIPUNCTURE: CPT

## 2022-02-23 PROCEDURE — 83735 ASSAY OF MAGNESIUM: CPT

## 2022-02-23 PROCEDURE — 82962 GLUCOSE BLOOD TEST: CPT

## 2022-02-23 PROCEDURE — 87635 SARS-COV-2 COVID-19 AMP PRB: CPT

## 2022-02-23 PROCEDURE — 85027 COMPLETE CBC AUTOMATED: CPT

## 2022-02-23 RX ORDER — POTASSIUM CHLORIDE 20 MEQ/1
40 TABLET, EXTENDED RELEASE ORAL
Status: COMPLETED | OUTPATIENT
Start: 2022-02-23 | End: 2022-02-23

## 2022-02-23 RX ADMIN — INSULIN LISPRO 3 UNITS: 100 INJECTION, SOLUTION INTRAVENOUS; SUBCUTANEOUS at 08:39

## 2022-02-23 RX ADMIN — SODIUM CHLORIDE, PRESERVATIVE FREE 10 ML: 5 INJECTION INTRAVENOUS at 06:14

## 2022-02-23 RX ADMIN — OXCARBAZEPINE 300 MG: 300 TABLET, FILM COATED ORAL at 08:39

## 2022-02-23 RX ADMIN — INSULIN LISPRO 5 UNITS: 100 INJECTION, SOLUTION INTRAVENOUS; SUBCUTANEOUS at 12:30

## 2022-02-23 RX ADMIN — BENZTROPINE MESYLATE 2 MG: 1 TABLET ORAL at 08:38

## 2022-02-23 RX ADMIN — POTASSIUM CHLORIDE 40 MEQ: 20 TABLET, EXTENDED RELEASE ORAL at 10:28

## 2022-02-23 RX ADMIN — SODIUM CHLORIDE, PRESERVATIVE FREE 10 ML: 5 INJECTION INTRAVENOUS at 14:17

## 2022-02-23 RX ADMIN — EMTRICITABINE AND TENOFOVIR DISOPROXIL FUMARATE 1 TABLET: 200; 300 TABLET, FILM COATED ORAL at 09:00

## 2022-02-23 RX ADMIN — INSULIN LISPRO 3 UNITS: 100 INJECTION, SOLUTION INTRAVENOUS; SUBCUTANEOUS at 12:30

## 2022-02-23 RX ADMIN — INSULIN LISPRO 5 UNITS: 100 INJECTION, SOLUTION INTRAVENOUS; SUBCUTANEOUS at 08:39

## 2022-02-23 NOTE — PROGRESS NOTES
[facility] CRITICAL CARE OUTREACH NURSE PROGRESS REPORT      SUBJECTIVE: Called to assess patient secondary to outreach protocol. MEWS Score: 1 (02/22/22 1825)  Vitals:    02/22/22 0732 02/22/22 1106 02/22/22 1502 02/22/22 1825   BP: 119/70 109/70 107/67 111/66   Pulse: 86 87 79 74   Resp: 16 16 16 16   Temp: 99.6 °F (37.6 °C) 99 °F (37.2 °C) 97.9 °F (36.6 °C) 97.9 °F (36.6 °C)   SpO2: 100% 97% 97% 96%   Weight:            LAB DATA:    Recent Labs     02/22/22  0645 02/21/22 0533 02/20/22  1449 02/20/22 0254 02/20/22 0254    144  --   --  148*   K 3.3* 3.3* 3.5   < > 2.8*   * 112*  --   --  117*   CO2 26 24  --   --  26   AGAP 5* 8  --   --  5*   * 192*  --   --  197*   BUN 9 9  --   --  15   CREA 0.70* 0.80  --   --  0.70*   GFRAA >60 >60  --   --  >60   GFRNA >60 >60  --   --  >60   CA 9.0 8.7  --   --  8.0*   MG 2.1 2.0  --   --  1.9   ALB 2.3* 2.5*  --   --  2.3*   TP 6.2* 6.0*  --   --  5.4*   GLOB 3.9* 3.5  --   --  3.1   AGRAT 0.6* 0.7*  --   --  0.7*   ALT 38 44  --   --  48    < > = values in this interval not displayed. Recent Labs     02/22/22 0645 02/21/22 0533 02/20/22 0254   WBC 8.2 7.1 7.2   HGB 11.2* 11.9* 11.4*   HCT 34.8* 37.0* 36.4*   * 84* 80*          OBJECTIVE: On arrival to room, I found patient to be resting in bed. Pain Assessment  Pain Intensity 1: 0 (02/22/22 8408)        Patient Stated Pain Goal: 0                                 ASSESSMENT:  Pt is resting in bed quietly. Respirations are even and unlabored. Pt is out of restraints and has bathroom privileges. VSS     PLAN:  Will continue to monitor per protocol.

## 2022-02-23 NOTE — PROGRESS NOTES
END OF SHIFT NOTE:    INTAKE/OUTPUT  02/21 0701 - 02/22 0700  In: 1158.5 [I.V.:1158.5]  Out: 3 [Urine:2]  Voiding: YES  Catheter: NO  Drain:              Flatus: Patient does have flatus present. Stool:  0 occurrences. Characteristics:      Emesis: 0 occurrences. Characteristics:        VITAL SIGNS  Patient Vitals for the past 12 hrs:   Temp Pulse Resp BP SpO2   02/22/22 1825 97.9 °F (36.6 °C) 74 16 111/66 96 %   02/22/22 1502 97.9 °F (36.6 °C) 79 16 107/67 97 %   02/22/22 1106 99 °F (37.2 °C) 87 16 109/70 97 %   02/22/22 0732 99.6 °F (37.6 °C) 86 16 119/70 100 %       Pain Assessment  Pain Intensity 1: 0 (02/22/22 1333)        Patient Stated Pain Goal: 0    Ambulating  Yes    Shift report given to oncoming nurse at the bedside.     Greta Sherman RN

## 2022-02-23 NOTE — PROGRESS NOTES
TRANSFER - OUT REPORT:    Verbal report given to LIBBY Rodriguez on Himanshu Miranda  being transferred to [facility] for routine progression of care       Report consisted of patients Situation, Background, Assessment and   Recommendations(SBAR). Information from the following report(s) SBAR, MAR and Recent Results was reviewed with the receiving nurse. Lines:   Peripheral IV 02/20/22 Anterior; Left Forearm (Active)   Site Assessment Clean, dry, & intact 02/23/22 0725   Phlebitis Assessment 0 02/23/22 0725   Infiltration Assessment 0 02/23/22 0725   Dressing Status Clean, dry, & intact 02/23/22 0725   Dressing Type Transparent;Tape 02/23/22 0725   Hub Color/Line Status Patent;Capped 02/23/22 0725   Action Taken Open ports on tubing capped 02/21/22 0238   Alcohol Cap Used Yes 02/23/22 0725       Peripheral IV 02/20/22 Right Antecubital (Active)   Site Assessment Clean, dry, & intact 02/23/22 0725   Phlebitis Assessment 0 02/23/22 0725   Infiltration Assessment 0 02/23/22 0725   Dressing Status Clean, dry, & intact 02/23/22 0725   Dressing Type Transparent;Tape 02/23/22 0725   Hub Color/Line Status Patent;Capped 02/23/22 0725   Alcohol Cap Used Yes 02/23/22 0725        Opportunity for questions and clarification was provided.       Patient transported with:   [employer]

## 2022-02-23 NOTE — PROGRESS NOTES
END OF SHIFT NOTE:    INTAKE/OUTPUT  02/22 0701 - 02/23 0700  In: 840 [P.O.:840]  Out: 1150 [Urine:1150]  Voiding: YES  Catheter: NO  Drain:              Flatus: Patient does have flatus present. Stool:  1 occurrences. Characteristics:  Stool Assessment  Stool Color: Brown  Stool Appearance: Soft  Stool Amount: Medium  Stool Source/Status: Rectum    Emesis: 0 occurrences. Characteristics:        VITAL SIGNS  Patient Vitals for the past 12 hrs:   Temp Pulse Resp BP SpO2   02/23/22 0659 99.1 °F (37.3 °C) 72 16 117/64 98 %   02/23/22 0352 99.6 °F (37.6 °C) 66 18 109/67 97 %   02/22/22 2302 98.9 °F (37.2 °C) 89 18 107/62 98 %       Pain Assessment  Pain Intensity 1: 0 (02/23/22 0725)        Patient Stated Pain Goal: 0    Ambulating  Yes    Shift report given to oncoming nurse at the bedside.     Светлана Louise RN

## 2022-02-23 NOTE — DIABETES MGMT
Patient admitted with DKA. Blood glucose ranged 163-250 yesterday with patient receiving Lantus 28 units and Humalog 39 units. Blood glucose this morning was 167. Reviewed patient current regimen: Lantus 28 units nightly, Humalog 5 units with meals, and Humalog correctional insulin. Patient would likely benefit from an increase in basal insulin to help improve fasting blood glucose. Patient would also likely benefit from an increase in prandial insulin to help offset hyperglycemia during the day related to caloric intake. Provider updated via [employer] regarding recommendations and patient glycemic control. Per chart review patient remains confused will follow along. Update at 36: spoke with patient sister Airam Needs regarding patient diabetes as patient to discharge to rehab today. Per patient sister patient has been living with diabetes since 2007. 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 Patient lives alone per sister, she was setting up his pill box but he was supposed to be using his insulin pen once a day. Reviewed A1c and significance. Patient was on Metformin at home per patient sister. Discussed dangers of taking or not taking insulin when confused. Encouraged sister to follow up with rehab staff if patient to discharge back home at that time as patient will likely need assistance with medication management. Discussed CGM therapy. Reviewed hypoglycemia signs, symptoms, and treatment. Patient sister verbalized understanding and voices no questions at this time.

## 2022-02-23 NOTE — DISCHARGE SUMMARY
Hospitalist Discharge Summary   Admit Date:  2022 12:25 PM   DC Note date: 2022  Name:  Sachi Ledezma   Age:  46 y.o. Sex:  male  :  1970   MRN:  [id number]   Room:    PCP:  Elvira Mckeon MD    Presenting Complaint: Vomiting    Initial Admission Diagnosis: DKA (diabetic ketoacidosis) ([facility] [E11.10]  Sepsis ([facility] [A41.9]     Problem List for this Hospitalization:  Hospital Problems as of 2022 Never Reviewed          Codes Class Noted - Resolved POA    Thrombocytopenia ([facility] ICD-10-CM: D69.6  ICD-9-CM: 287.5  2022 - Present Unknown        * (Principal) DKA (diabetic ketoacidosis) ([facility] ICD-10-CM: E11.10  ICD-9-CM: 250.12  2022 - Present Unknown        Sepsis ([facility] ICD-10-CM: A41.9  ICD-9-CM: 038.9, 995.91  2022 - Present Unknown        Schizophrenia ([facility] ICD-10-CM: F20.9  ICD-9-CM: 295.90  3/15/2020 - Present Yes        HIV (human immunodeficiency virus infection) ([facility] ICD-10-CM: B20  ICD-9-CM: V08  2014 - Present Yes              Hospital Course:  49-year-old male with a past medical history of hypertension, HIV, schizophrenia, insulin-dependent diabetes mellitus, presented in the setting of altered mental status.  Most of the history obtained by brother at bedside since patient unable to provide any details.  According to the brother and caregiver he has been presenting with worsening mentation for the last few days, the got worse over time so they decided to bring him to the emergency department[facility] have not seen any coughing or complaining of chest pain also no evidence of abdominal pain nausea vomiting or diarrhea.  In the emergency department the patient was found to be tachycardic, with leukocytosis and a blood sugar of 1410.  He was a started on insulin drip.  He was given IV fluids and IV antibiotics.      Patient has not been letting the care aides in to his house per his mothers report.      Patient was treated for DKA with insulin gtt, gap correctly nicely. Pt returned to baseline mentation and cleared for regular diabetic diet - tolerating well. Has been seen by PT with recs for STR and this has now been arranged. Pt is medically stable and agreeable to plan. Disposition: Rehab Facility  Diet: ADULT DIET Dysphagia - Minced & Moist; 5 carb choices (75 gm/meal)  Code Status: Full Code    Follow Up Orders: Follow-up Appointments   Procedures    FOLLOW UP VISIT Appointment in: One Week 1 week PCP     1 week PCP     Standing Status:   Standing     Number of Occurrences:   1     Order Specific Question:   Appointment in     Answer: One Week       Follow-up Information     Follow up With Specialties Details Why Contact Info    [facility]   [address]  [facility], Whitney Mraia MD Internal Medicine  CALL [facility] [address]  [phone]            Follow up labs/diagnostics (ultimately defer to outpatient provider):  PCP 1 week      Time spent in patient discharge and coordination 34 minutes. Plan was discussed with patient, RN, CM. All questions answered. Patient was stable at time of discharge. Instructions given to call a physician or return if any concerns. Discharge Info:   Current Discharge Medication List      CONTINUE these medications which have NOT CHANGED    Details   nevirapine (VIRAMUNE) 200 mg tablet Take 400 mg by mouth daily. cholecalciferol, vitamin D3, (Vitamin D3) 50 mcg (2,000 unit) tab Take 125 mcg by mouth daily. multivitamin (ONE A DAY) tablet Take 1 Tablet by mouth daily. albuterol (Ventolin HFA) 90 mcg/actuation inhaler Take 2 Puffs by inhalation every four (4) hours as needed for Wheezing. Qty: 2 Inhaler, Refills: 11      OXcarbazepine (TRILEPTAL) 300 mg tablet Take 300 mg by mouth every twelve (12) hours. atorvastatin (LIPITOR) 10 mg tablet Take 40 mg by mouth nightly. benztropine (COGENTIN) 2 mg tablet Take 2 mg by mouth two (2) times a day. OLANZapine (ZyPREXA) 20 mg tablet Take 20 mg by mouth nightly. insulin detemir (LEVEMIR) 100 unit/mL injection 30 Units by SubCUTAneous route daily. Cetirizine (ZYRTEC) 10 mg cap Take  by mouth daily as needed. emtricitabine-tenofovir (TRUVADA) 200-300 mg per tablet Take 1 Tab by mouth daily. TRAZODONE HCL (TRAZODONE PO) Take 150 mg by mouth nightly. Unknown dose       haloperidol (HALDOL) 5 mg tablet Take 5 mg by mouth nightly. METFORMIN HCL (GLUCOPHAGE PO) Take 1,000 mg by mouth two (2) times a day. Procedures done this admission:  * No surgery found *    Consults this admission:  IP CONSULT TO PSYCHIATRY    Echocardiogram/EKG results:  No results found for this or any previous visit.        EKG Results     Procedure 720 Value Units Date/Time    EKG (Check If Upper Abdominal Pain or symptoms of SOB, Diaphoresis, or Tachycardia) [[id number]] Collected: 02/16/22 1145    Order Status: Completed Updated: 02/16/22 1315     Ventricular Rate 113 BPM      Atrial Rate 113 BPM      P-R Interval 138 ms      QRS Duration 84 ms      Q-T Interval 388 ms      QTC Calculation (Bezet) 532 ms      Calculated P Axis 72 degrees      Calculated R Axis 53 degrees      Calculated T Axis 40 degrees      Diagnosis --     Sinus tachycardia  Right atrial enlargement  Nonspecific ST abnormality  Prolonged QT  Abnormal ECG  When compared with ECG of 19-MAY-2020 13:06,  Non-specific change in ST segment in Inferior leads  ST now depressed in Anterolateral leads  Confirmed by [facility]  MD ()PARMJIT ([id number]) on 2/16/2022 1:15:05 PM            Diagnostic Imaging/Tests:   XR CHEST SNGL V    Result Date: 2/17/2022  EXAMINATION: One view chest HISTORY: Right subclavian CVC insertion TECHNIQUE: Frontal chest. COMPARISON: 2/16/2022 FINDINGS:  A right central venous catheter has been placed. The tip projects over the superior vena cava at the level of the hilum. There is no significant pneumothorax. There is no pleural effusion. The heart is unchanged. No other significant interval changes. 1. Findings as described above. XR CHEST SNGL V    Result Date: 2/16/2022  Chest portable CLINICAL INDICATION: Acute moderate shortness of breath with altered mental status, urinary incontinence, weakness, diabetes, HIV COMPARISON: 9/17/2020 TECHNIQUE: single AP portable view chest at 5:00 PM semiupright FINDINGS:  There is no evidence of consolidation, pneumothorax, pleural effusion or pulmonary edema. The mediastinal and hilar contours are stable and normal given technique, positioning. Patient is slightly rotated. Lung volumes are normal. There is artifact from external wires, tubes, support devices. .     No acute disease. CT HEAD WO CONT    Result Date: 2/16/2022  History: Nausea and vomiting, confusion Exam: CT head without contrast Technique: Thin section axial CT images were obtained from the skullbase through the vertex. Radiation dose reduction techniques were used for this study. Our CT scanners use one or all of the following: Automated exposure control, adjustment of the mA and/or kV according to patient size, use of iterative reconstruction. Findings: The ventricles are normal in size, shape, and position. No abnormal parenchymal density is present. No extra-axial fluid collection is present. There is no mass or mass-effect. The basilar cisterns are patent. There is complete opacification of the left maxillary sinus. The remaining paranasal sinuses and mastoid air cells are clear. Complete opacification of the left maxillary sinus. No acute intracranial abnormality.        All Micro Results     Procedure Component Value Units Date/Time    COVID-19 RAPID TEST [[id number]]     Order Status: Sent     CULTURE, BLOOD [[id number]] Collected: 02/16/22 1314    Order Status: Completed Specimen: Blood Updated: 02/21/22 0720     Special Requests: --        LEFT  Antecubital       Culture result: NO GROWTH 5 DAYS       CULTURE, BLOOD [[id number]] Collected: 02/16/22 1331    Order Status: Completed Specimen: Blood Updated: 02/21/22 0720     Special Requests: --        RIGHT  Antecubital       Culture result: NO GROWTH 5 DAYS       COVID-19 RAPID TEST [[id number]] Collected: 02/16/22 1314    Order Status: Completed Specimen: Nasopharyngeal Updated: 02/16/22 1350     Specimen source Nasopharyngeal        COVID-19 rapid test Not detected        Comment:      The specimen is NEGATIVE for SARS-CoV-2, the novel coronavirus associated with COVID-19. A negative result does not rule out COVID-19. This test has been authorized by the FDA under an Emergency Use Authorization (EUA) for use by authorized laboratories.         Fact sheet for Healthcare Providers: ConventionUpdate.co.nz  Fact sheet for Patients: ConventionUpdate.co.nz       Methodology: Isothermal Nucleic Acid Amplification               Labs: Results:       BMP, Mg, Phos Recent Labs     02/23/22  0635 02/22/22  0645 02/21/22  0533    143 144   K 3.2* 3.3* 3.3*   * 112* 112*   CO2 26 26 24   AGAP 7 5* 8   BUN 9 9 9   CREA 0.70* 0.70* 0.80   CA 8.9 9.0 8.7   * 163* 192*   MG 2.2 2.1 2.0      CBC Recent Labs     02/23/22 0635 02/22/22  0645 02/21/22  0533   WBC 7.1 8.2 7.1   RBC 4.16* 4.34 4.64   HGB 10.7* 11.2* 11.9*   HCT 33.4* 34.8* 37.0*   * 104* 84*      LFT Recent Labs     02/23/22  0635 02/22/22  0645 02/21/22  0533   ALT 32 38 44   AP 68 66 65   TP 6.1* 6.2* 6.0*   ALB 2.2* 2.3* 2.5*   GLOB 3.9* 3.9* 3.5   AGRAT 0.6* 0.6* 0.7*      Cardiac Testing Lab Results   Component Value Date/Time    Troponin-I, Qt. <0.02 (L) 05/19/2020 12:36 PM      Coagulation Tests Lab Results   Component Value Date/Time    Prothrombin time 13.6 02/21/2022 04:41 PM    Prothrombin time 13.4 03/15/2020 03:30 PM    Prothrombin time 12.2 (H) 04/24/2009 03:00 AM    INR 1.0 02/21/2022 04:41 PM    INR 1.0 03/15/2020 03:30 PM    INR 1.2 04/24/2009 03:00 AM      A1c Lab Results   Component Value Date/Time    Hemoglobin A1c >16.0 (H) 02/16/2022 04:54 PM    Hemoglobin A1c 8.7 (H) 03/18/2020 03:59 AM      Lipid Panel No results found for: CHOL, CHOLPOCT, CHOLX, CHLST, CHOLV, 343702, HDL, HDLP, LDL, LDLC, DLDLP, 655474, VLDLC, VLDL, TGLX, TRIGL, TRIGP, TGLPOCT, CHHD, CHHDX   Thyroid Panel No results found for: TSH, T4, FT4, TT3, T3U, TSHEXT     Most Recent UA Lab Results   Component Value Date/Time    Color YELLOW 02/17/2022 03:15 AM    Appearance CLEAR 02/17/2022 03:15 AM    pH (UA) 5.5 02/17/2022 03:15 AM    Protein 30 (A) 02/17/2022 03:15 AM    Glucose >1,000 02/17/2022 03:15 AM    Ketone TRACE (A) 02/17/2022 03:15 AM    Bilirubin Negative 02/17/2022 03:15 AM    Blood SMALL (A) 02/17/2022 03:15 AM    Urobilinogen 0.2 02/17/2022 03:15 AM    Nitrites Negative 02/17/2022 03:15 AM    Leukocyte Esterase Negative 02/17/2022 03:15 AM    WBC 0 05/20/2015 04:20 PM    RBC 0 05/20/2015 04:20 PM    Epithelial cells 0 05/20/2015 04:20 PM    Bacteria 0 02/17/2022 03:15 AM    Casts HYALINE 02/17/2022 03:15 AM    Crystals, urine 0 05/20/2015 04:20 PM    Mucus 0 05/20/2015 04:20 PM    Other observations RESULTS VERIFIED MANUALLY 02/17/2022 03:15 AM          All Labs from Last 24 Hrs:  Recent Results (from the past 24 hour(s))   GLUCOSE, POC    Collection Time: 02/22/22 11:37 AM   Result Value Ref Range    Glucose (POC) 250 (H) 65 - 100 mg/dL    Performed by [facility]    GLUCOSE, POC    Collection Time: 02/22/22  4:27 PM   Result Value Ref Range    Glucose (POC) 232 (H) 65 - 100 mg/dL    Performed by [facility]    GLUCOSE, POC    Collection Time: 02/22/22  8:52 PM   Result Value Ref Range    Glucose (POC) 211 (H) 65 - 100 mg/dL    Performed by Britton TORIBIO W/O DIFF    Collection Time: 02/23/22 6:35 AM   Result Value Ref Range    WBC 7.1 4.3 - 11.1 K/uL    RBC 4.16 (L) 4.23 - 5.6 M/uL    HGB 10.7 (L) 13.6 - 17.2 g/dL    HCT 33.4 (L) 41.1 - 50.3 %    MCV 80.3 79.6 - 97.8 FL    MCH 25.7 (L) 26.1 - 32.9 PG    MCHC 32.0 31.4 - 35.0 g/dL    RDW 15.4 (H) 11.9 - 14.6 %    PLATELET 916 (L) 476 - 450 K/uL    MPV 10.4 9.4 - 12.3 FL    ABSOLUTE NRBC 0.00 0.0 - 0.2 K/uL   METABOLIC PANEL, COMPREHENSIVE    Collection Time: 02/23/22  6:35 AM   Result Value Ref Range    Sodium 144 136 - 145 mmol/L    Potassium 3.2 (L) 3.5 - 5.1 mmol/L    Chloride 111 (H) 98 - 107 mmol/L    CO2 26 21 - 32 mmol/L    Anion gap 7 7 - 16 mmol/L    Glucose 162 (H) 65 - 100 mg/dL    BUN 9 6 - 23 MG/DL    Creatinine 0.70 (L) 0.8 - 1.5 MG/DL    GFR est AA >60 >60 ml/min/1.73m2    GFR est non-AA >60 >60 ml/min/1.73m2    Calcium 8.9 8.3 - 10.4 MG/DL    Bilirubin, total 0.4 0.2 - 1.1 MG/DL    ALT (SGPT) 32 12 - 65 U/L    AST (SGOT) 19 15 - 37 U/L    Alk.  phosphatase 68 50 - 136 U/L    Protein, total 6.1 (L) 6.3 - 8.2 g/dL    Albumin 2.2 (L) 3.5 - 5.0 g/dL    Globulin 3.9 (H) 2.3 - 3.5 g/dL    A-G Ratio 0.6 (L) 1.2 - 3.5     MAGNESIUM    Collection Time: 02/23/22  6:35 AM   Result Value Ref Range    Magnesium 2.2 1.8 - 2.4 mg/dL   GLUCOSE, POC    Collection Time: 02/23/22  7:36 AM   Result Value Ref Range    Glucose (POC) 167 (H) 65 - 100 mg/dL    Performed by Rachel        Current Med List in Hospital:   Current Facility-Administered Medications   Medication Dose Route Frequency    potassium chloride (K-DUR, KLOR-CON M20) SR tablet 40 mEq  40 mEq Oral NOW    insulin glargine (LANTUS) injection 28 Units  28 Units SubCUTAneous QHS    insulin lispro (HUMALOG) injection 5 Units  5 Units SubCUTAneous TIDAC    benztropine (COGENTIN) tablet 2 mg  2 mg Oral BID    haloperidoL (HALDOL) tablet 5 mg  5 mg Oral QHS PRN    insulin lispro (HUMALOG) injection   SubCUTAneous AC&HS    OLANZapine (ZyPREXA zydis) disintegrating tablet 20 mg  20 mg Oral QHS    LORazepam (ATIVAN) injection 1 mg  1 mg IntraVENous Q2H PRN    dextrose 40% (GLUTOSE) oral gel 1 Tube  15 g Oral PRN    glucagon (GLUCAGEN) injection 1 mg  1 mg IntraMUSCular PRN    sodium chloride (NS) flush 5-40 mL  5-40 mL IntraVENous Q8H    sodium chloride (NS) flush 5-40 mL  5-40 mL IntraVENous PRN    acetaminophen (TYLENOL) tablet 650 mg  650 mg Oral Q6H PRN    Or    acetaminophen (TYLENOL) suppository 650 mg  650 mg Rectal Q6H PRN    polyethylene glycol (MIRALAX) packet 17 g  17 g Oral DAILY PRN    ondansetron (ZOFRAN ODT) tablet 4 mg  4 mg Oral Q8H PRN    Or    ondansetron (ZOFRAN) injection 4 mg  4 mg IntraVENous Q6H PRN    [Held by provider] enoxaparin (LOVENOX) injection 40 mg  40 mg SubCUTAneous Q24H    albuterol (PROVENTIL VENTOLIN) nebulizer solution 2.5 mg  2.5 mg Nebulization Q6H PRN    atorvastatin (LIPITOR) tablet 10 mg  10 mg Oral QHS    emtricitabine-tenofovir (TDF) (TRUVADA) 200-300 mg per tablet 1 Tablet  1 Tablet Oral DAILY    OXcarbazepine (TRILEPTAL) tablet 300 mg  300 mg Oral Q12H    NUTRITIONAL SUPPORT ELECTROLYTE PRN ORDERS   Does Not Apply PRN       Allergies   Allergen Reactions    Thorazine [Chlorpromazine] Rash     Immunization History   Administered Date(s) Administered    Influenza Vaccine 10/04/2013       Recent Vital Data:  Patient Vitals for the past 24 hrs:   Temp Pulse Resp BP SpO2   02/23/22 0659 99.1 °F (37.3 °C) 72 16 117/64 98 %   02/23/22 0352 99.6 °F (37.6 °C) 66 18 109/67 97 %   02/22/22 2302 98.9 °F (37.2 °C) 89 18 107/62 98 %   02/22/22 1825 97.9 °F (36.6 °C) 74 16 111/66 96 %   02/22/22 1502 97.9 °F (36.6 °C) 79 16 107/67 97 %   02/22/22 1106 99 °F (37.2 °C) 87 16 109/70 97 %     Oxygen Therapy  O2 Sat (%): 98 % (02/23/22 0659)  Pulse via Oximetry: 81 beats per minute (02/20/22 1515)  O2 Device: None (Room air) (02/23/22 0725)  Skin Assessment: Clean, dry, & intact (02/19/22 1900)  Skin Protection for O2 Device: N/A (02/19/22 1900)  O2 Flow Rate (L/min): 3 l/min (02/20/22 0700)    Estimated body mass index is 22.13 kg/m² as calculated from the following:    Height as of 2/13/22: 5' 5\" (1.651 m). Weight as of this encounter: 60.3 kg (133 lb). Intake/Output Summary (Last 24 hours) at 2/23/2022 1011  Last data filed at 2/23/2022 0844  Gross per 24 hour   Intake 840 ml   Output 1150 ml   Net -310 ml         Physical Exam:  General:    Well nourished. No overt distress  Head:  Normocephalic, atraumatic  Eyes:  Sclerae appear normal.  Pupils equally round. HENT:  Nares appear normal, no drainage. Moist mucous membranes  Neck:  No restricted ROM. Trachea midline  CV:   RRR. No m/r/g. No JVD  Lungs:   CTAB. No wheezing, rhonchi, or rales. Even, unlabored  Abdomen:   Soft, nontender, nondistended. Extremities: Warm and dry. No cyanosis or clubbing. No edema. Skin:     No rashes. Normal coloration  Neuro:  CN II-XII grossly intact. Psych:  Normal mood and affect. Signed:  Warner Randle DO    Part of this note may have been written by using a voice dictation software. The note has been proof read but may still contain some grammatical/other typographical errors.

## 2022-02-27 LAB
HEPARIN INDUCED PLT,XHIPA: NEGATIVE
HIT INTERPRETATION,XINTPR: NEGATIVE
HIT PROFILE,XHITT: NORMAL
OPTICAL DENSITY READ,XHITAO: 0.1 ABS

## 2022-03-18 PROBLEM — D69.6 THROMBOCYTOPENIA (HCC): Status: ACTIVE | Noted: 2022-02-21

## 2022-03-19 PROBLEM — L08.9 LACERATION OF LEFT THUMB WITH INFECTION: Status: ACTIVE | Noted: 2020-03-15

## 2022-03-19 PROBLEM — A41.9 SEPSIS (HCC): Status: ACTIVE | Noted: 2022-02-16

## 2022-03-19 PROBLEM — S61.012A LACERATION OF LEFT THUMB WITH INFECTION: Status: ACTIVE | Noted: 2020-03-15

## 2022-03-19 PROBLEM — E11.10 DKA (DIABETIC KETOACIDOSIS) (HCC): Status: ACTIVE | Noted: 2022-02-16

## 2022-03-19 PROBLEM — F20.9 SCHIZOPHRENIA (HCC): Status: ACTIVE | Noted: 2020-03-15

## 2022-03-19 PROBLEM — E78.5 HLD (HYPERLIPIDEMIA): Status: ACTIVE | Noted: 2020-03-15

## 2022-03-20 PROBLEM — B19.10 HBV (HEPATITIS B VIRUS) INFECTION: Status: ACTIVE | Noted: 2020-03-15

## 2022-06-07 ENCOUNTER — HOSPITAL ENCOUNTER (OUTPATIENT)
Dept: WOUND CARE | Age: 52
Setting detail: RECURRING SERIES
Discharge: HOME | End: 2022-06-07
Payer: MEDICARE

## 2022-06-07 VITALS
HEART RATE: 81 BPM | BODY MASS INDEX: 25.99 KG/M2 | HEIGHT: 65 IN | TEMPERATURE: 98.6 F | WEIGHT: 156 LBS | DIASTOLIC BLOOD PRESSURE: 92 MMHG | SYSTOLIC BLOOD PRESSURE: 135 MMHG

## 2022-06-07 DIAGNOSIS — L97.529 DIABETIC ULCER OF LEFT GREAT TOE (HCC): ICD-10-CM

## 2022-06-07 DIAGNOSIS — E11.621 DIABETIC ULCER OF LEFT GREAT TOE (HCC): ICD-10-CM

## 2022-06-07 PROCEDURE — 99203 OFFICE O/P NEW LOW 30 MIN: CPT

## 2022-06-07 PROCEDURE — 99203 OFFICE O/P NEW LOW 30 MIN: CPT | Performed by: SURGERY

## 2022-06-07 RX ORDER — BACITRACIN, NEOMYCIN, POLYMYXIN B 400; 3.5; 5 [USP'U]/G; MG/G; [USP'U]/G
OINTMENT TOPICAL ONCE
Status: CANCELLED | OUTPATIENT
Start: 2022-06-07 | End: 2022-06-07

## 2022-06-07 RX ORDER — GENTAMICIN SULFATE 1 MG/G
OINTMENT TOPICAL ONCE
Status: CANCELLED | OUTPATIENT
Start: 2022-06-07 | End: 2022-06-07

## 2022-06-07 RX ORDER — GINSENG 100 MG
CAPSULE ORAL ONCE
Status: CANCELLED | OUTPATIENT
Start: 2022-06-07 | End: 2022-06-07

## 2022-06-07 RX ORDER — LIDOCAINE HYDROCHLORIDE 20 MG/ML
JELLY TOPICAL ONCE
Status: CANCELLED | OUTPATIENT
Start: 2022-06-07 | End: 2022-06-07

## 2022-06-07 RX ORDER — LIDOCAINE HYDROCHLORIDE 40 MG/ML
SOLUTION TOPICAL ONCE
Status: CANCELLED | OUTPATIENT
Start: 2022-06-07 | End: 2022-06-07

## 2022-06-07 RX ORDER — BETAMETHASONE DIPROPIONATE 0.05 %
OINTMENT (GRAM) TOPICAL ONCE
Status: CANCELLED | OUTPATIENT
Start: 2022-06-07 | End: 2022-06-07

## 2022-06-07 RX ORDER — LIDOCAINE 50 MG/G
OINTMENT TOPICAL ONCE
Status: CANCELLED | OUTPATIENT
Start: 2022-06-07 | End: 2022-06-07

## 2022-06-07 RX ORDER — CLOBETASOL PROPIONATE 0.5 MG/G
OINTMENT TOPICAL ONCE
Status: CANCELLED | OUTPATIENT
Start: 2022-06-07 | End: 2022-06-07

## 2022-06-07 RX ORDER — BACITRACIN ZINC AND POLYMYXIN B SULFATE 500; 1000 [USP'U]/G; [USP'U]/G
OINTMENT TOPICAL ONCE
Status: CANCELLED | OUTPATIENT
Start: 2022-06-07 | End: 2022-06-07

## 2022-06-07 RX ORDER — LIDOCAINE 40 MG/G
CREAM TOPICAL ONCE
Status: CANCELLED | OUTPATIENT
Start: 2022-06-07 | End: 2022-06-07

## 2022-06-07 NOTE — FLOWSHEET NOTE
06/07/22 0937   Wound 06/07/22 Toe (Comment  which one) Anterior;Right left great toe-   Date First Assessed/Time First Assessed: 06/07/22 0934   Present on Hospital Admission: Yes  Wound Approximate Age at First Assessment (Weeks): 4 weeks  Primary Wound Type: (c) Traumatic  Location: (c) Toe (Comment  which one)  Wound Location Orientat. .. Wound Image     Wound Etiology Traumatic   Dressing Status Other (Comment)  (none)   Wound Cleansed Cleansed with saline   Wound Length (cm) 2 cm   Wound Width (cm) 3 cm   Wound Depth (cm) 0 cm   Wound Surface Area (cm^2) 6 cm^2   Wound Volume (cm^3) 0 cm^3   Drainage Amount None   Odor None   Helene-wound Assessment Intact   Margins Attached edges   Wound 06/07/22 Toe (Comment  which one) Anterior; Left right great toe-   Date First Assessed/Time First Assessed: 06/07/22 0934   Present on Hospital Admission: Yes  Wound Approximate Age at First Assessment (Weeks): 4 weeks  Primary Wound Type: (c)   Location: (c) Toe (Comment  which one)  Wound Location Orientation: Ante. ..    Wound Image     Wound Etiology Traumatic  (feet were \"frozen solid\" in the blizzard we had and wounds b)   Dressing Status   (none)   Wound Cleansed Cleansed with saline   Wound Length (cm) 3 cm   Wound Width (cm) 5 cm   Wound Depth (cm) 0.1 cm   Wound Surface Area (cm^2) 15 cm^2   Wound Volume (cm^3) 1.5 cm^3   Wound Assessment Dry;Slough   Drainage Amount None   Odor None   Helene-wound Assessment Dry/flaky   Margins Defined edges   Wound Thickness Description not for Pressure Injury Full thickness

## 2022-06-07 NOTE — WOUND CARE
Discharge Instructions for  Carri Cintron  [address] [place] [address], [address] [place] [address]  Phone [phone]   Fax [phone]      NAME:  Bryce Hensley OF BIRTH:  1970  MEDICAL RECORD NUMBER:  [id number]  DATE:  6/7/2022    Return Appointment:   2 weeks with Dr. Asha Gallardo MD      Instructions:   Right great toe: Cleanse with normal saline. Apply Iodosorb to tip of toe. Bandage with rolled gauze. Left great toe: Cleanse with normal saline. Acticoat Flex 3: cut top approximate size of wound, apply to wound bed. Cover with rolled gauze. Change each dressing three times per week or more often as needed for soiling. Keep pressure off toes. Keep dressings clean & dry. Reduce nicotine/ cigarette use. Continue to monitor and treat glucose levels to promote healing. Increase protein intake  & use amino acid supplements (Glucerna & Sagar samples sent with patient) to help with tissue repair. Should you experience increased redness, swelling, pain, foul odor, size of wound(s), or have a temperature over 101 degrees please contact the [address] [place] [address] Road at [phone] or if after hours contact your primary care physician or go to the hospital emergency department. PLEASE NOTE: IF YOU ARE UNABLE TO OBTAIN WOUND SUPPLIES, CONTINUE TO USE THE SUPPLIES YOU HAVE AVAILABLE UNTIL YOU ARE ABLE TO REACH US. IT IS MOST IMPORTANT TO KEEP THE WOUND COVERED AT ALL TIMES.     Electronically signed Baldev Black RN on 6/7/2022 at 10:10 AM

## 2022-06-07 NOTE — PROGRESS NOTES
[address], [address], [address] [place] [address] Rd  [phone]    Progress Notes   Manasa Lopez       MRN: [id number]     : 1970     Age: 46 y.o. Subjective/HPI:   This patient is a 46 y.o. male with diabetes and paranoid schizophrenia seen and evaluated at the wound clinic for initial evaluation of ulcers on both great toes. Patient injured his toes 5 months ago when walking barefoot in the snow. He is not sure about how long the ulcers have been present. The ulcers are not painful or tender and he has not noted any redness or drainage. The patient has not been applying any dressings over the area. He continues to ambulate frequently barefoot. He has had very poor control of his blood sugars. Hemoglobin A1c was 16 in February. The patient reports good oral intake. He does smoke cigarettes daily. Patient has never had any similar problems in the past.    Review of Systems  Pertinent items are noted in HPI. Past Medical History:   Diagnosis Date    Autoimmune disease ([address]     HIV    Diabetes [facility]     Hypertension     Infectious disease     HIV    Paranoid schizophrenia ([facility]     Psychiatric disorder     bipolar, paranoid,     Seizures ([facility] [facility]       Past Surgical History:   Procedure Laterality Date    [place] ABDOMEN SURGERY PROC UNLISTED      hernia repair      Allergies   Allergen Reactions    Chlorpromazine Rash      Social History     Tobacco Use    Smoking status: Current Every Day Smoker     Packs/day: 1.00     Types: Cigarettes    Smokeless tobacco: Never Used    Tobacco comment: working on it   Substance Use Topics    Alcohol use: No      Social History     Social History Narrative    Not on file     History reviewed. No pertinent family history.    [unfilled]  Current Outpatient Medications   Medication Sig    albuterol sulfate  (90 Base) MCG/ACT inhaler Inhale 2 puffs into the lungs every 4 hours as needed    atorvastatin (LIPITOR) 10 MG tablet Take 40 mg by mouth    benztropine (COGENTIN) 2 MG tablet Take 2 mg by mouth 2 times daily    Cetirizine HCl 10 MG CAPS Take by mouth daily as needed    Cholecalciferol 50 MCG (2000 UT) TABS Take 125 mcg by mouth daily    emtricitabine-tenofovir (TRUVADA) 200-300 MG per tablet Take 1 tablet by mouth daily    haloperidol (HALDOL) 5 MG tablet Take 5 mg by mouth    insulin detemir (LEVEMIR) 100 UNIT/ML injection vial Inject 30 Units into the skin daily    nevirapine (VIRAMUNE) 200 MG tablet Take 400 mg by mouth daily    OLANZapine (ZYPREXA) 20 MG tablet Take 20 mg by mouth    OXcarbazepine (TRILEPTAL) 300 MG tablet Take 300 mg by mouth every 12 hours     No current facility-administered medications for this encounter. Objective:     Vitals:    06/07/22 0921 06/07/22 1005   BP: (!) 135/92    Pulse: 81    Temp: 98.6 °F (37 °C)    TempSrc: Temporal    Weight:  156 lb (70.8 kg)   Height:  5' 5\" (1.651 m)       Physical Exam:  Ambulation: Normal  Gen- the patient is well developed and in no acute distress  HEENT- no focal abnormalities of the scalp or face. Neck- no JVD or retractions  Lungs- normal respiratory effort. Cardiovascular:  Lower limb pulses: 1+. Abd- soft and no masses evident. Ext- warm without cyanosis. There is no significant lower leg edema. Skin- no jaundice or rashes. Linear ulcer on the tip of the right great toe, full-thickness ulcer tip of left great toe, no erythema, swelling, or drainage. Please see the specific measurements and descriptions of the wound(s) below. There is no evidence of periwound induration or warmth. No purulence or odor. Neuro- alert and oriented x 3. No gross motor deficits are present. Lower limb sensation is intact. Psychological: Affect normal. Mood normal. Memory intact.          Assessment:   Diabetic ulcers both great toes  Patient Active Problem List   Diagnosis    Thrombocytopenia (HCC)    HLD (hyperlipidemia)  Schizophrenia [facility]    Sepsis ([facility]    Diabetes [facility]    DKA (diabetic ketoacidosis) ([facility]    Laceration of left thumb with infection    HIV (human immunodeficiency virus infection) ([facility]    HBV (hepatitis B virus) infection     Plan:   Plan to start dressing changes with Iodosorb on the right, Acticoat on the left. Patient encouraged to wear foot wear that does not apply pressure over the tips of his great toes. He was also encouraged to stop smoking and have better control of his blood sugars. Glucerna was also recommended for nutritional supplementation. Follow-up in 2 weeks for recheck. [unfilled]      Thank you very much for the opportunity to participate in this patient's care. [unfilled]    TIME:  If total time for today's E/M service is used for level of service it is documented below. This time includes physician non-face-to-face service time visit on the date of service and includes    Preparing to see the patient (eg, review of tests)  Obtaining and/or reviewing separately obtained history  Performing a medically necessary appropriate examination and/or evaluation  Counseling and educating the patient/family/caregiver  Ordering medications, tests, or procedures  Referring and communicating with other health care professionals as needed  Documenting clinical information in the electronic or other health record  Independently interpreting results (not reported separately) and communicating results to the patient/family/caregiver  Care coordination (not reported separately)    E/M time =       25   minutes.

## 2022-06-29 ENCOUNTER — APPOINTMENT (OUTPATIENT)
Dept: WOUND CARE | Age: 52
End: 2022-06-29
Payer: MEDICARE

## 2022-07-06 ENCOUNTER — HOSPITAL ENCOUNTER (OUTPATIENT)
Dept: WOUND CARE | Age: 52
Setting detail: RECURRING SERIES
Discharge: HOME | End: 2022-07-06
Payer: MEDICARE

## 2022-07-06 VITALS
BODY MASS INDEX: 25.49 KG/M2 | OXYGEN SATURATION: 98 % | HEART RATE: 78 BPM | RESPIRATION RATE: 18 BRPM | DIASTOLIC BLOOD PRESSURE: 78 MMHG | HEIGHT: 65 IN | SYSTOLIC BLOOD PRESSURE: 116 MMHG | WEIGHT: 153 LBS | TEMPERATURE: 97.8 F

## 2022-07-06 DIAGNOSIS — E11.621 DIABETIC ULCER OF LEFT GREAT TOE (HCC): Primary | ICD-10-CM

## 2022-07-06 DIAGNOSIS — L97.529 DIABETIC ULCER OF LEFT GREAT TOE (HCC): Primary | ICD-10-CM

## 2022-07-06 PROCEDURE — 99213 OFFICE O/P EST LOW 20 MIN: CPT

## 2022-07-06 RX ORDER — LIDOCAINE 50 MG/G
OINTMENT TOPICAL ONCE
Status: CANCELLED | OUTPATIENT
Start: 2022-07-06 | End: 2022-07-06

## 2022-07-06 RX ORDER — TRAZODONE HYDROCHLORIDE 300 MG/1
300 TABLET ORAL DAILY
COMMUNITY

## 2022-07-06 RX ORDER — BETAMETHASONE DIPROPIONATE 0.05 %
OINTMENT (GRAM) TOPICAL ONCE
Status: CANCELLED | OUTPATIENT
Start: 2022-07-06 | End: 2022-07-06

## 2022-07-06 RX ORDER — LIDOCAINE HYDROCHLORIDE 20 MG/ML
JELLY TOPICAL ONCE
Status: CANCELLED | OUTPATIENT
Start: 2022-07-06 | End: 2022-07-06

## 2022-07-06 RX ORDER — BACITRACIN ZINC AND POLYMYXIN B SULFATE 500; 1000 [USP'U]/G; [USP'U]/G
OINTMENT TOPICAL ONCE
Status: CANCELLED | OUTPATIENT
Start: 2022-07-06 | End: 2022-07-06

## 2022-07-06 RX ORDER — GENTAMICIN SULFATE 1 MG/G
OINTMENT TOPICAL ONCE
Status: CANCELLED | OUTPATIENT
Start: 2022-07-06 | End: 2022-07-06

## 2022-07-06 RX ORDER — CLOBETASOL PROPIONATE 0.5 MG/G
OINTMENT TOPICAL ONCE
Status: CANCELLED | OUTPATIENT
Start: 2022-07-06 | End: 2022-07-06

## 2022-07-06 RX ORDER — GINSENG 100 MG
CAPSULE ORAL ONCE
Status: CANCELLED | OUTPATIENT
Start: 2022-07-06 | End: 2022-07-06

## 2022-07-06 RX ORDER — LIDOCAINE HYDROCHLORIDE 40 MG/ML
SOLUTION TOPICAL ONCE
Status: CANCELLED | OUTPATIENT
Start: 2022-07-06 | End: 2022-07-06

## 2022-07-06 RX ORDER — BACITRACIN, NEOMYCIN, POLYMYXIN B 400; 3.5; 5 [USP'U]/G; MG/G; [USP'U]/G
OINTMENT TOPICAL ONCE
Status: CANCELLED | OUTPATIENT
Start: 2022-07-06 | End: 2022-07-06

## 2022-07-06 RX ORDER — LIDOCAINE 40 MG/G
CREAM TOPICAL ONCE
Status: CANCELLED | OUTPATIENT
Start: 2022-07-06 | End: 2022-07-06

## 2022-07-06 NOTE — WOUND CARE
Discharge Instructions for  Carri Cintron  [address] [place] [address]  [address], [address]  Phone [phone]   Fax [phone]      NAME:  Jinny Badillo OF BIRTH:  1970  MEDICAL RECORD NUMBER:  [id number]  DATE:  7/6/2022    Return Appointment:   2 weeks with Dr. Cony John MD      Instructions:   Right great toe:   Wound is healed! Left great toe:   Cleanse with normal saline. Iodosorb- apply thin layer to wound bed, cover with cover dressing, change daily  Cover with rolled gauze. Do not get foot wet in shower. Keep pressure off toes by wearing diabetic shoes. Do not walk barefoot. Reduce nicotine/ cigarette use. Continue to monitor and treat glucose levels to promote healing. Increase protein intake  & use amino acid supplements (Glucerna & Sagar samples sent with patient) to help with tissue repair. Should you experience increased redness, swelling, pain, foul odor, size of wound(s), or have a temperature over 101 degrees please contact the [address] [place] [address] Road at [phone] or if after hours contact your primary care physician or go to the hospital emergency department. PLEASE NOTE: IF YOU ARE UNABLE TO OBTAIN WOUND SUPPLIES, CONTINUE TO USE THE SUPPLIES YOU HAVE AVAILABLE UNTIL YOU ARE ABLE TO REACH US. IT IS MOST IMPORTANT TO KEEP THE WOUND COVERED AT ALL TIMES.     Electronically signed Chun Poon PT, [facility] on 7/6/2022 at 8:38 AM

## 2022-07-06 NOTE — FLOWSHEET NOTE
07/06/22 0832   Wound 06/07/22 Toe (Comment  which one) Anterior;Right left great toe-   Date First Assessed/Time First Assessed: 06/07/22 0934   Present on Hospital Admission: Yes  Wound Approximate Age at First Assessment (Weeks): 4 weeks  Primary Wound Type: (c) Traumatic  Location: (c) Toe (Comment  which one)  Wound Location Orientat. .. Wound Image    Wound Etiology Traumatic   Dressing Status Dry   Wound Cleansed Cleansed with saline   Dressing/Treatment Open to air   Wound Length (cm) 0 cm   Wound Width (cm) 0 cm   Wound Depth (cm) 0 cm   Wound Surface Area (cm^2) 0 cm^2   Change in Wound Size % (l*w) 100   Wound Volume (cm^3) 0 cm^3   Drainage Amount None   Odor None   Wound 06/07/22 Toe (Comment  which one) Anterior; Left right great toe-   Date First Assessed/Time First Assessed: 06/07/22 0934   Present on Hospital Admission: Yes  Wound Approximate Age at First Assessment (Weeks): 4 weeks  Primary Wound Type: (c)   Location: (c) Toe (Comment  which one)  Wound Location Orientation: Ante. ..    Wound Image    Wound Etiology Traumatic   Dressing Status Dry   Wound Cleansed Cleansed with saline   Wound Length (cm) 0.1 cm   Wound Width (cm) 0.5 cm   Wound Depth (cm) 0.1 cm   Wound Surface Area (cm^2) 0.05 cm^2   Change in Wound Size % (l*w) 99.67   Wound Volume (cm^3) 0.005 cm^3   Wound Healing % 100   Wound Assessment Dry   Drainage Amount None   Odor None   Helene-wound Assessment Dry/flaky   Wound Thickness Description not for Pressure Injury Full thickness

## 2022-07-06 NOTE — PROGRESS NOTES
[address], [address] [facility], [address] [place] [address] [address]  [phone]    Progress Notes   Yodit Tejeda       MRN: [id number]     : 1970     Age: 46 y.o. Subjective/HPI:   This patient is a 46 y.o. male with diabetes seen and evaluated at the wound clinic for follow-up of diabetic ulcers on both great toes. Patient has been receiving dressing changes with Iodosorb on the right and Acticoat on the left. On today's visit he is without complaints. He denies any pain or discomfort nor has he had any redness, swelling, or drainage. He has not been closely monitoring his blood sugars. Patient has not taking protein supplements. He reports no changes in overall health since previous visit. Review of Systems  Pertinent items are noted in HPI. Past Medical History:   Diagnosis Date    Autoimmune disease ([address]     HIV    Diabetes ([facility]     Hypertension     Infectious disease     HIV    Paranoid schizophrenia ([facility]     Psychiatric disorder     bipolar, paranoid,     Seizures ([facility] [address] [facility]       Past Surgical History:   Procedure Laterality Date    [place] ABDOMEN SURGERY PROC UNLISTED      hernia repair      Allergies   Allergen Reactions    Valproic Acid Other (See Comments)     irritability   irritability       Chlorpromazine Rash      Social History     Tobacco Use    Smoking status: Current Every Day Smoker     Packs/day: 1.00     Types: Cigarettes    Smokeless tobacco: Never Used    Tobacco comment: working on it   Substance Use Topics    Alcohol use: No      Social History     Social History Narrative    Not on file     History reviewed. No pertinent family history. Prior to Admission medications    Medication Sig Start Date End Date Taking?  Authorizing Provider   traZODone (DESYREL) 300 MG tablet Take 300 mg by mouth daily    Historical Provider, MD   albuterol sulfate  (90 Base) MCG/ACT inhaler Inhale 2 puffs into the lungs every 4 hours as needed 9/17/20   Ar Automatic Reconciliation   atorvastatin (LIPITOR) 10 MG tablet Take 40 mg by mouth    Ar Automatic Reconciliation   benztropine (COGENTIN) 2 MG tablet Take 2 mg by mouth 2 times daily    Ar Automatic Reconciliation   Cetirizine HCl 10 MG CAPS Take by mouth daily as needed    Ar Automatic Reconciliation   Cholecalciferol 50 MCG (2000 UT) TABS Take 125 mcg by mouth daily    Ar Automatic Reconciliation   emtricitabine-tenofovir (TRUVADA) 200-300 MG per tablet Take 1 tablet by mouth daily    Ar Automatic Reconciliation   insulin detemir (LEVEMIR) 100 UNIT/ML injection vial Inject 30 Units into the skin daily    Ar Automatic Reconciliation   nevirapine (VIRAMUNE) 200 MG tablet Take 400 mg by mouth daily    Ar Automatic Reconciliation   OLANZapine (ZYPREXA) 20 MG tablet Take 20 mg by mouth    Ar Automatic Reconciliation   OXcarbazepine (TRILEPTAL) 300 MG tablet Take 300 mg by mouth every 12 hours    Ar Automatic Reconciliation      Current Outpatient Medications   Medication Sig Dispense Refill    traZODone (DESYREL) 300 MG tablet Take 300 mg by mouth daily      albuterol sulfate  (90 Base) MCG/ACT inhaler Inhale 2 puffs into the lungs every 4 hours as needed      atorvastatin (LIPITOR) 10 MG tablet Take 40 mg by mouth      benztropine (COGENTIN) 2 MG tablet Take 2 mg by mouth 2 times daily      Cetirizine HCl 10 MG CAPS Take by mouth daily as needed      Cholecalciferol 50 MCG (2000 UT) TABS Take 125 mcg by mouth daily      emtricitabine-tenofovir (TRUVADA) 200-300 MG per tablet Take 1 tablet by mouth daily      insulin detemir (LEVEMIR) 100 UNIT/ML injection vial Inject 30 Units into the skin daily      nevirapine (VIRAMUNE) 200 MG tablet Take 400 mg by mouth daily      OLANZapine (ZYPREXA) 20 MG tablet Take 20 mg by mouth      OXcarbazepine (TRILEPTAL) 300 MG tablet Take 300 mg by mouth every 12 hours       No current facility-administered medications for this encounter. Objective:     Vitals:    07/06/22 0817   BP: 116/78   Pulse: 78   Resp: 18   Temp: 97.8 °F (36.6 °C)   TempSrc: Oral   SpO2: 98%   Weight: 153 lb (69.4 kg)   Height: 5' 5\" (1.651 m)        Physical Exam:  Ambulation: Normal  Gen- the patient is well developed and in no acute distress  HEENT- no focal abnormalities of the scalp or face. Neck- no JVD or retractions  Lungs- normal respiratory effort. Cardiovascular:  Lower limb pulses: 1+. Abd- soft and no masses evident. Ext- warm without cyanosis. There is no lower leg edema. Skin- no jaundice or rashes. Ulcer on right has healed, on the left side there is a linear ulcer of the tip of the great toe with some thick surrounding callus, no sign of infection. Please see the specific measurements and descriptions of the wound(s) below. There is no evidence of periwound induration or warmth. No purulence or odor. Neuro- alert and oriented x 3. No gross imotor deficits are present. Lower limb sensation is decreased. Psychological: Affect normal. Mood normal. Memory intact. Assessment:   Diabetic ulcer left great toe  Patient Active Problem List   Diagnosis    Thrombocytopenia ([address]    HLD (hyperlipidemia)    Schizophrenia ([address]    Sepsis ([address]    Diabetes ([address]    DKA (diabetic ketoacidosis) ([address]    Laceration of left thumb with infection    HIV (human immunodeficiency virus infection) ([address]    HBV (hepatitis B virus) infection    Diabetic ulcer of left great toe ([address]     Plan:   Plan for trimming of callus tip of left great toe, dressing changes with Iodosorb, encourage protein supplementation and better control of blood sugars. Follow-up in 2 weeks for recheck. No follow-ups on file. Procedure Note  Indications:  Based on my examination of this patient's wound(s)/ulcer(s) today, debridement is required to promote healing and evaluate the wound base.     Performed by: Shady Owusu MD    Consent obtained: Yes    Time out

## 2022-07-20 ENCOUNTER — HOSPITAL ENCOUNTER (OUTPATIENT)
Dept: WOUND CARE | Age: 52
Setting detail: RECURRING SERIES
Discharge: HOME | End: 2022-07-20
Payer: MEDICARE

## 2022-07-20 VITALS
DIASTOLIC BLOOD PRESSURE: 81 MMHG | HEART RATE: 81 BPM | HEIGHT: 65 IN | WEIGHT: 155.4 LBS | SYSTOLIC BLOOD PRESSURE: 132 MMHG | BODY MASS INDEX: 25.89 KG/M2 | TEMPERATURE: 98.5 F

## 2022-07-20 DIAGNOSIS — L97.529 DIABETIC ULCER OF LEFT GREAT TOE (HCC): Primary | ICD-10-CM

## 2022-07-20 DIAGNOSIS — E11.621 DIABETIC ULCER OF LEFT GREAT TOE (HCC): Primary | ICD-10-CM

## 2022-07-20 PROCEDURE — 99213 OFFICE O/P EST LOW 20 MIN: CPT

## 2022-07-20 PROCEDURE — 99213 OFFICE O/P EST LOW 20 MIN: CPT | Performed by: SURGERY

## 2022-07-20 RX ORDER — LIDOCAINE 50 MG/G
OINTMENT TOPICAL ONCE
Status: CANCELLED | OUTPATIENT
Start: 2022-07-20 | End: 2022-07-20

## 2022-07-20 RX ORDER — LIDOCAINE HYDROCHLORIDE 20 MG/ML
JELLY TOPICAL ONCE
Status: CANCELLED | OUTPATIENT
Start: 2022-07-20 | End: 2022-07-20

## 2022-07-20 RX ORDER — GENTAMICIN SULFATE 1 MG/G
OINTMENT TOPICAL ONCE
Status: CANCELLED | OUTPATIENT
Start: 2022-07-20 | End: 2022-07-20

## 2022-07-20 RX ORDER — CLOBETASOL PROPIONATE 0.5 MG/G
OINTMENT TOPICAL ONCE
Status: CANCELLED | OUTPATIENT
Start: 2022-07-20 | End: 2022-07-20

## 2022-07-20 RX ORDER — BETAMETHASONE DIPROPIONATE 0.05 %
OINTMENT (GRAM) TOPICAL ONCE
Status: CANCELLED | OUTPATIENT
Start: 2022-07-20 | End: 2022-07-20

## 2022-07-20 RX ORDER — LIDOCAINE HYDROCHLORIDE 40 MG/ML
SOLUTION TOPICAL ONCE
Status: CANCELLED | OUTPATIENT
Start: 2022-07-20 | End: 2022-07-20

## 2022-07-20 RX ORDER — BACITRACIN ZINC AND POLYMYXIN B SULFATE 500; 1000 [USP'U]/G; [USP'U]/G
OINTMENT TOPICAL ONCE
Status: CANCELLED | OUTPATIENT
Start: 2022-07-20 | End: 2022-07-20

## 2022-07-20 RX ORDER — BACITRACIN, NEOMYCIN, POLYMYXIN B 400; 3.5; 5 [USP'U]/G; MG/G; [USP'U]/G
OINTMENT TOPICAL ONCE
Status: CANCELLED | OUTPATIENT
Start: 2022-07-20 | End: 2022-07-20

## 2022-07-20 RX ORDER — GINSENG 100 MG
CAPSULE ORAL ONCE
Status: CANCELLED | OUTPATIENT
Start: 2022-07-20 | End: 2022-07-20

## 2022-07-20 RX ORDER — LIDOCAINE 40 MG/G
CREAM TOPICAL ONCE
Status: CANCELLED | OUTPATIENT
Start: 2022-07-20 | End: 2022-07-20

## 2022-07-20 NOTE — WOUND CARE
Discharge Instructions for  Carri Cintron  [address] [place] [address]  [address], [address]   Phone [phone]   Fax [phone]      NAME:  Marciano Cochran OF BIRTH:  1970  MEDICAL RECORD NUMBER:  [id number]  DATE:  7/20/2022    Return Appointment:   2 weeks with Dr. Jorge Tyler MD    Left great toe:  Cleanse with normal saline. Iodosorb- apply thin layer to wound bed, cover with cover dressing, change daily  Cover with rolled gauze. Do not get foot wet in shower. Keep pressure off toes by wearing diabetic shoes. Do not walk barefoot. Reduce nicotine/ cigarette use. Continue to monitor and treat glucose levels to promote healing. Increase protein intake to help with tissue repair. Should you experience increased redness, swelling, pain, foul odor, size of wound(s), or have a temperature over 101 degrees please contact the [address] [place] [address] Road at [phone] or if after hours contact your primary care physician or go to the hospital emergency department. PLEASE NOTE: IF YOU ARE UNABLE TO OBTAIN WOUND SUPPLIES, CONTINUE TO USE THE SUPPLIES YOU HAVE AVAILABLE UNTIL YOU ARE ABLE TO REACH US. IT IS MOST IMPORTANT TO KEEP THE WOUND COVERED AT ALL TIMES.     Electronically signed Valerie Giordano RN on 7/20/2022 at 8:36 AM

## 2022-07-20 NOTE — FLOWSHEET NOTE
07/20/22 0813   Wound 06/07/22 Toe (Comment  which one) Anterior; Left right great toe-   Date First Assessed/Time First Assessed: 06/07/22 0934   Present on Hospital Admission: Yes  Wound Approximate Age at First Assessment (Weeks): 4 weeks  Primary Wound Type: (c)   Location: (c) Toe (Comment  which one)  Wound Location Orientation: Ante. ..    Wound Image    Wound Etiology Traumatic   Dressing Status Dry   Wound Cleansed Cleansed with saline   Wound Length (cm) 0.2 cm   Wound Width (cm) 0.3 cm   Wound Depth (cm)   (cannot see wound base)   Wound Surface Area (cm^2) 0.06 cm^2   Change in Wound Size % (l*w) 99.6   Wound Assessment Dry;Eschar dry   Drainage Amount None   Odor None   Helene-wound Assessment Dry/flaky   Margins Attached edges   Wound Thickness Description not for Pressure Injury Full thickness

## 2022-07-20 NOTE — PROGRESS NOTES
[address]   Progress Note     Will More  MEDICAL RECORD NUMBER:  [id number]  AGE: 46 y.o. GENDER: male  : 1970  EPISODE DATE:  2022    Subjective:     No chief complaint on file. Has been tolerating wound care dressings without problems. Assessment:     Diabetic ulcer right great toe healed, left great toe much improved    Problem List Items Addressed This Visit          Endocrine    Diabetic ulcer of left great toe ([address] - Primary    Relevant Orders    Initiate Outpatient Wound Care Protocol       Wound evaluation: Diabetic ulcer right great toe healed, tip of left great toe much improved, some surrounding callus, no redness, swelling, or drainage. Patient has high risk for morbidity and mortality due to conditions affecting wound healing discussed or addressed today: Diabetes, neuropathy, cigarette use  Education and discussion held with patient regarding these disease processes especially issues related to the wound(s). Plan:   Discontinue dressing changes on the right, start dressing changes with Iodosorb on the left, continue diabetic foot wear, encouraged patient to stop smoking and to use protein supplements. Follow-up in 2 weeks for recheck. Wound 22 Toe (Comment  which one) Anterior; Left right great toe- (Active)   Wound Image   22 0813   Wound Etiology Traumatic 22 08   Dressing Status Dry 22 08   Wound Cleansed Cleansed with saline 22 08   Wound Length (cm) 0.2 cm 22 08   Wound Width (cm) 0.3 cm 22 0813   Wound Depth (cm) 0.1 cm 22 0832   Wound Surface Area (cm^2) 0.06 cm^2 22 08   Change in Wound Size % (l*w) 99.6 22 0813   Wound Volume (cm^3) 0.005 cm^3 22 0832   Wound Healing % 100 22 0832   Wound Assessment Dry;Eschar dry 22 08   Drainage Amount None 22 0813   Odor None 22 08   Helene-wound Assessment Dry/flaky 22 0813   Margins Attached edges 07/20/22 0813   Wound Thickness Description not for Pressure Injury Full thickness 07/20/22 0813   Number of days: 42          Wound care: Any procedure as below (debridement, skin substitute application, biopsy, total contact casting, chemical cauterization). Please see attached Discharge Instructions for specific wound treatment plan  Offloading: Avoid pressure and trauma to wound diabetic shoes/insert   Edema control: N/A  Infection: no signs of acute infection. Continue to monitor. Circulation: Available vascular imaging reviewed. N/A  Nutrition: stressed lean protein intake and known diabetic with tight glycemic control stressed. Most recent pertinent imaging and labs reviewed: N/A  Case discussed with other providers involved in the patient's care: N/A      HISTORY of PRESENT ILLNESS BUDDY Casas is a 46 y.o. male who presents today for wound/ulcer evaluation. History of Wound Context: Per original history and physical on this patient. Changes in history since last exam and/or wound center visit: Patient has been receiving dressing changes with Iodosorb. On today's visit he is without complaints. Patient denies having any unusual pain or discomfort nor has he noted any redness, swelling, or drainage. He reports good control of his blood sugars. He is still smoking cigarettes and is not using protein supplements. No changes in overall health since previous visit. Objective:    /81   Pulse 81   Temp 98.5 °F (36.9 °C) (Temporal)   Ht 5' 5\" (1.651 m)   Wt 155 lb 6.4 oz (70.5 kg)   BMI 25.86 kg/m²   Wt Readings from Last 3 Encounters:   07/20/22 155 lb 6.4 oz (70.5 kg)   07/06/22 153 lb (69.4 kg)   06/07/22 156 lb (70.8 kg)       PHYSICAL EXAM  General: alert and oriented to person, place and time, well-developed and well nourished, and in no acute distress. Skin: warm and dry, no rash. Head: normocephalic and atraumatic.   Eyes: extraocular eye movements intact, conjunctivae normal, and sclera anicteric. ENT: hearing grossly normal bilaterally. Normal appearance. Respiratory: no chest wall tenderness. no respiratory distress. GI: abdomen soft, non-tender and non-distended, benign. Musculoskeletal: normal range of motion of joints. Nontender calves. No cyanosis. Edema negative. Neurologic: Speech normal.  Decreased lower extremity sensation. Mental status normal.     PAST MEDICAL HISTORY        Diagnosis Date    Autoimmune disease ([facility]     HIV    Diabetes ([facility]     Hypertension     Infectious disease     HIV    Paranoid schizophrenia ([facility]     Psychiatric disorder     bipolar, paranoid,     Seizures ([facility]        PAST SURGICAL HISTORY    Past Surgical History:   Procedure Laterality Date    CT ABDOMEN SURGERY PROC UNLISTED      hernia repair       FAMILY HISTORY    History reviewed. No pertinent family history.     SOCIAL HISTORY    Social History     Tobacco Use    Smoking status: Every Day     Packs/day: 1.00     Types: Cigarettes    Smokeless tobacco: Never    Tobacco comments:     working on it   Substance Use Topics    Alcohol use: No    Drug use: No       ALLERGIES    Allergies   Allergen Reactions    Valproic Acid Other (See Comments)     irritability   irritability       Chlorpromazine Rash       MEDICATIONS    Current Outpatient Medications on File Prior to Encounter   Medication Sig Dispense Refill    traZODone (DESYREL) 300 MG tablet Take 300 mg by mouth daily      albuterol sulfate  (90 Base) MCG/ACT inhaler Inhale 2 puffs into the lungs every 4 hours as needed      atorvastatin (LIPITOR) 10 MG tablet Take 40 mg by mouth      benztropine (COGENTIN) 2 MG tablet Take 2 mg by mouth 2 times daily      Cetirizine HCl 10 MG CAPS Take by mouth daily as needed      Cholecalciferol 50 MCG (2000 UT) TABS Take 125 mcg by mouth daily      emtricitabine-tenofovir (TRUVADA) 200-300 MG per tablet Take 1 tablet by mouth daily      insulin detemir (LEVEMIR) 100 UNIT/ML injection vial Inject 30 Units into the skin daily      nevirapine (VIRAMUNE) 200 MG tablet Take 400 mg by mouth daily      OLANZapine (ZYPREXA) 20 MG tablet Take 20 mg by mouth      OXcarbazepine (TRILEPTAL) 300 MG tablet Take 300 mg by mouth every 12 hours       No current facility-administered medications on file prior to encounter. REVIEW OF SYSTEMS  A comprehensive review of systems was negative except for:  As in HPI    Written patient dismissal instructions given to patient and signed by patient or POA. Discharge Instructions         Discharge Instructions for  Carri Cintron  [address] [place] [address]  [address], [address]  Phone [phone]  Fax [phone]        NAME:  Johnathon Stack OF BIRTH:  1970  MEDICAL RECORD NUMBER:  [id number]  DATE:  7/20/2022     Return Appointment:   2 weeks with Dr. Caty Reyes MD     Left great toe:  Cleanse with normal saline. Iodosorb- apply thin layer to wound bed, cover with cover dressing, change daily  Cover with rolled gauze. Do not get foot wet in shower. Keep pressure off toes by wearing diabetic shoes. Do not walk barefoot. Reduce nicotine/ cigarette use. Continue to monitor and treat glucose levels to promote healing. Increase protein intake to help with tissue repair. Should you experience increased redness, swelling, pain, foul odor, size of wound(s), or have a temperature over 101 degrees please contact the [address] [place] [address] Road at [phone] or if after hours contact your primary care physician or go to the hospital emergency department. PLEASE NOTE: IF YOU ARE UNABLE TO OBTAIN WOUND SUPPLIES, CONTINUE TO USE THE SUPPLIES YOU HAVE AVAILABLE UNTIL YOU ARE ABLE TO REACH US. IT IS MOST IMPORTANT TO KEEP THE WOUND COVERED AT ALL TIMES. Thank you very much for the opportunity to participate in this patient's care.       Electronically signed by Blanca Barber MD on 7/20/22 at 8:43 AM EDT     TIME:  If total time for today's E/M service is used for level of service it is documented below.     This time includes physician non-face-to-face service time visit on the date of service and includes    Preparing to see the patient (eg, review of tests)  Obtaining and/or reviewing separately obtained history  Performing a medically necessary appropriate examination and/or evaluation  Counseling and educating the patient/family/caregiver  Ordering medications, tests, or procedures  Referring and communicating with other health care professionals as needed  Documenting clinical information in the electronic or other health record  Independently interpreting results (not reported separately) and communicating results to the patient/family/caregiver  Care coordination (not reported separately)    E/M time = 20 Minutes

## 2022-07-20 NOTE — DISCHARGE INSTRUCTIONS
Discharge Instructions for  Carri Cintron  [address] [place] [address]  [address], [address]  Phone [phone]  Fax [phone]        NAME:  Kendall Marcus OF BIRTH:  1970  MEDICAL RECORD NUMBER:  [id number]  DATE:  7/20/2022     Return Appointment:   2 weeks with Dr. Alek Paniagua MD     Left great toe:  Cleanse with normal saline. Iodosorb- apply thin layer to wound bed, cover with cover dressing, change daily  Cover with rolled gauze. Do not get foot wet in shower. Keep pressure off toes by wearing diabetic shoes. Do not walk barefoot. Reduce nicotine/ cigarette use. Continue to monitor and treat glucose levels to promote healing. Increase protein intake to help with tissue repair. Should you experience increased redness, swelling, pain, foul odor, size of wound(s), or have a temperature over 101 degrees please contact the [address] [place] [address] Road at [phone] or if after hours contact your primary care physician or go to the hospital emergency department. PLEASE NOTE: IF YOU ARE UNABLE TO OBTAIN WOUND SUPPLIES, CONTINUE TO USE THE SUPPLIES YOU HAVE AVAILABLE UNTIL YOU ARE ABLE TO REACH US. IT IS MOST IMPORTANT TO KEEP THE WOUND COVERED AT ALL TIMES.

## 2022-08-08 ENCOUNTER — HOSPITAL ENCOUNTER (OUTPATIENT)
Dept: WOUND CARE | Age: 52
Setting detail: RECURRING SERIES
Discharge: HOME | End: 2022-08-08
Payer: MEDICARE

## 2022-08-08 ENCOUNTER — HOSPITAL ENCOUNTER (OUTPATIENT)
Dept: GENERAL RADIOLOGY | Age: 52
Discharge: HOME | End: 2022-08-11
Payer: MEDICARE

## 2022-08-08 VITALS
DIASTOLIC BLOOD PRESSURE: 87 MMHG | RESPIRATION RATE: 18 BRPM | HEART RATE: 89 BPM | WEIGHT: 154.2 LBS | HEIGHT: 65 IN | SYSTOLIC BLOOD PRESSURE: 124 MMHG | TEMPERATURE: 98.3 F | BODY MASS INDEX: 25.69 KG/M2

## 2022-08-08 DIAGNOSIS — E11.621 DIABETIC ULCER OF LEFT GREAT TOE (HCC): Primary | ICD-10-CM

## 2022-08-08 DIAGNOSIS — E11.621 DIABETIC ULCER OF LEFT GREAT TOE (HCC): ICD-10-CM

## 2022-08-08 DIAGNOSIS — L97.529 DIABETIC ULCER OF LEFT GREAT TOE (HCC): Primary | ICD-10-CM

## 2022-08-08 DIAGNOSIS — L97.529 DIABETIC ULCER OF LEFT GREAT TOE (HCC): ICD-10-CM

## 2022-08-08 PROCEDURE — 99213 OFFICE O/P EST LOW 20 MIN: CPT | Performed by: SURGERY

## 2022-08-08 PROCEDURE — 73630 X-RAY EXAM OF FOOT: CPT

## 2022-08-08 PROCEDURE — 11055 PARING/CUTG B9 HYPRKER LES 1: CPT

## 2022-08-08 PROCEDURE — 11055 PARING/CUTG B9 HYPRKER LES 1: CPT | Performed by: SURGERY

## 2022-08-08 NOTE — FLOWSHEET NOTE
08/08/22 0855   Wound 06/07/22 Toe (Comment  which one) Anterior; Left right great toe-   Date First Assessed/Time First Assessed: 06/07/22 0934   Present on Hospital Admission: Yes  Wound Approximate Age at First Assessment (Weeks): 4 weeks  Primary Wound Type: (c)   Location: (c) Toe (Comment  which one)  Wound Location Orientation: Ante. .. Wound Image    Wound Etiology Diabetic [address]   Dressing Status Dry   Wound Cleansed Cleansed with saline   Wound Length (cm) 0.1 cm   Wound Width (cm) 0.1 cm   Wound Depth (cm) 0.6 cm   Wound Surface Area (cm^2) 0.01 cm^2   Change in Wound Size % (l*w) 99.93   Wound Volume (cm^3) 0.006 cm^3   Wound Healing % 100   Wound Assessment Pink/red   Drainage Amount Small   Drainage Description Serous   Odor None   Helene-wound Assessment Hyperkeratosis (callous)   Wound Thickness Description not for Pressure Injury Full thickness     Patient is not on ANTICOAGULANT THERAPY. Wound mechanically debrided with gauze and normal saline.

## 2022-08-08 NOTE — WOUND CARE
Discharge Instructions for  Carri Cintron  [address], [address]  Phone [phone]   Fax [phone]      NAME:  Aury Mayorga OF BIRTH:  1970  MEDICAL RECORD NUMBER:  [id number]  DATE:  8/8/2022    Return Appointment:   2 weeks with Dr. Dai Echeverria MD      Instructions: Left great toe:  Cleanse with normal saline. Iodosorb- apply thin layer to wound bed, cover with cover dressing, change daily  Cover with rolled gauze. Do not get foot wet in shower. Keep pressure off toes by wearing diabetic shoes. Do not walk barefoot. Reduce nicotine/ cigarette use. Continue to monitor and treat glucose levels to promote healing. Keep tight Blood Sugars. Increase protein intake to help with tissue repair. XRAY Left Foot focus LEFT GREAT TOE           Should you experience increased redness, swelling, pain, foul odor, size of wound(s), or have a temperature over 101 degrees please contact the [address] [place] [address] Road at [phone] or if after hours contact your primary care physician or go to the hospital emergency department. PLEASE NOTE: IF YOU ARE UNABLE TO OBTAIN WOUND SUPPLIES, CONTINUE TO USE THE SUPPLIES YOU HAVE AVAILABLE UNTIL YOU ARE ABLE TO REACH US. IT IS MOST IMPORTANT TO KEEP THE WOUND COVERED AT ALL TIMES. Electronically signed Cindy Bhatia.  Isabelle Vilchis RN on 8/8/2022 at 9:10 AM

## 2022-08-08 NOTE — PROGRESS NOTES
[address]   Progress Note     Boris Moreira  MEDICAL RECORD NUMBER:  [id number]  AGE: 46 y.o. GENDER: male  : 1970  EPISODE DATE:  2022    Subjective:     Chief Complaint   Patient presents with    Wound Check     Left GT        Patient without any new wound care issues. Assessment:     Diabetic ulcer left great toe    Problem List Items Addressed This Visit          Endocrine    Diabetic ulcer of left great toe ([address] - Primary    Relevant Orders    XR FOOT LEFT (MIN 3 VIEWS)       Wound evaluation: Diabetic ulcer left great toe improved, thick surrounding callus, no sign of infection. Callus noted right lateral heel. Patient has high risk for morbidity and mortality due to conditions affecting wound healing discussed or addressed today: Diabetes, neuropathy, cigarette use, noncompliance  Education and discussion held with patient regarding these disease processes especially issues related to the wound(s). Plan:   Plan to trim callus left great toe, obtain x-ray left foot, continue dressing changes with Iodosorb, diabetic foot wear, and protein supplements. Follow-up 2 weeks for recheck. Wound 22 Toe (Comment  which one) Anterior; Left right great toe- (Active)   Wound Image   22 0855   Wound Etiology Diabetic [address] 22 0855   Dressing Status Dry 22 0855   Wound Cleansed Cleansed with saline 22 08   Wound Length (cm) 0.1 cm 22 0855   Wound Width (cm) 0.1 cm 22 0855   Wound Depth (cm) 0.6 cm 22 0855   Wound Surface Area (cm^2) 0.01 cm^2 22 0855   Change in Wound Size % (l*w) 99.93 22 0855   Wound Volume (cm^3) 0.006 cm^3 22 0855   Wound Healing % 100 22 0855   Wound Assessment Pink/red 22 08   Drainage Amount Small 22 08   Drainage Description Serous 22 08   Odor None 22 08   Helene-wound Assessment Hyperkeratosis (callous) 22 08   Margins Attached edges 07/20/22 0813   Wound Thickness Description not for Pressure Injury Full thickness 08/08/22 0855   Number of days: 62          Wound care: Any procedure as below (debridement, skin substitute application, biopsy, total contact casting, chemical cauterization). Please see attached Discharge Instructions for specific wound treatment plan  Offloading: Avoid pressure and trauma to wound diabetic shoes/insert   Edema control: N/A  Infection: no signs of acute infection. Continue to monitor. Circulation: Available vascular imaging reviewed. N/A  Nutrition: stressed lean protein intake and known diabetic with tight glycemic control stressed. Most recent pertinent imaging and labs reviewed: N/A  Case discussed with other providers involved in the patient's care: N/A      HISTORY of PRESENT ILLNESS BUDDY Pickett is a 46 y.o. male who presents today for wound/ulcer evaluation. History of Wound Context: Per original history and physical on this patient. Changes in history since last exam and/or wound center visit: Patient has been receiving dressing changes with Iodosorb. He is still soaking his foot in the past despite being advised not to do so. He has not doing dressing changes as instructed and continues to smoke cigarettes. Patient is not following a diabetic diet nor is he taking protein supplements. He does report having some drainage from the ulcer but has not noted any redness, swelling, or fever. No changes in overall health since previous visit. Objective:    /87   Pulse 89   Temp 98.3 °F (36.8 °C)   Resp 18   Ht 5' 5\" (1.651 m)   Wt 154 lb 3.2 oz (69.9 kg)   BMI 25.66 kg/m²   Wt Readings from Last 3 Encounters:   08/08/22 154 lb 3.2 oz (69.9 kg)   07/20/22 155 lb 6.4 oz (70.5 kg)   07/06/22 153 lb (69.4 kg)       PHYSICAL EXAM  General: alert and oriented to person, place and time, well-developed and well nourished, and in no acute distress.   Skin: warm and dry, no 200-300 MG per tablet Take 1 tablet by mouth daily      insulin detemir (LEVEMIR) 100 UNIT/ML injection vial Inject 30 Units into the skin daily      nevirapine (VIRAMUNE) 200 MG tablet Take 400 mg by mouth daily      OLANZapine (ZYPREXA) 20 MG tablet Take 20 mg by mouth      OXcarbazepine (TRILEPTAL) 300 MG tablet Take 300 mg by mouth every 12 hours       No current facility-administered medications on file prior to encounter. REVIEW OF SYSTEMS  A comprehensive review of systems was negative except for:  As in HPI. Written patient dismissal instructions given to patient and signed by patient or POA. Procedure Note  Indications:  Based on my examination of this patient's wound(s)/ulcer(s) today, debridement is required to promote healing and evaluate the wound base.     Performed by: Walter Jacobsen MD    Consent obtained: Yes    Time out taken: Yes    Debridement: Callus trimming    Using #15 blade scalpel the wound(s)/ulcer(s) was/were sharply debrided down through and including the removal of    Dermis     Devitalized Tissue Debrided: callus    Pre Debridement Measurements:  Are located in the Wound/Ulcer Documentation Flow Sheet    Wound/Ulcer: Diabetic ulcer left great toe    Post Debridement Measurements:  Wound/Ulcer Descriptions are Pre Debridement except measurements: see flowsheet    Percent of Wound(s)/Ulcer(s) Debrided: 100%    Total Surface Area Debrided:  2 sq cm     Estimated Blood Loss:  Minimal    Hemostasis Achieved: Pressure    Procedural Pain: 0 / 10     Post Procedural Pain: 0 / 10     Response to treatment: well tolerated by patient    Discharge Instructions         Discharge Instructions for  [address] [place] [address]  [address] [facility], [address] [facility]  Phone [phone]   Fax [phone]      NAME:  Curt Jade  YOB: 1970  MEDICAL RECORD NUMBER:  [id number]  DATE:  @ED@    Return Appointment:   2 weeks with Dr. Walter Jacobsen, MD      Instructions: Left great toe:  Cleanse with normal saline. Iodosorb- apply thin layer to wound bed, cover with cover dressing, change daily  Cover with rolled gauze. Do not get foot wet in shower. Keep pressure off toes by wearing diabetic shoes. Do not walk barefoot. Reduce nicotine/ cigarette use. Continue to monitor and treat glucose levels to promote healing. Keep tight Blood Sugars. Increase protein intake to help with tissue repair. AQUAPHOR to FEET for callus/cracked heels    XRAY Left Foot focus LEFT GREAT TOE        Should you experience increased redness, swelling, pain, foul odor, size of wound(s), or have a temperature over 101 degrees please contact the [address] [place] [address] Road at [phone] or if after hours contact your primary care physician or go to the hospital emergency department. PLEASE NOTE: IF YOU ARE UNABLE TO OBTAIN WOUND SUPPLIES, CONTINUE TO USE THE SUPPLIES YOU HAVE AVAILABLE UNTIL YOU ARE ABLE TO REACH US. IT IS MOST IMPORTANT TO KEEP THE WOUND COVERED AT ALL TIMES. Electronically signed Kiah Glaser RN on 8/8/2022 at 9:11 AM            Thank you very much for the opportunity to participate in this patient's care. Electronically signed by Palma Jacobs MD on 8/8/22 at 9:45 AM EDT     TIME:  If total time for today's E/M service is used for level of service it is documented below.     This time includes physician non-face-to-face service time visit on the date of service and includes    Preparing to see the patient (eg, review of tests)  Obtaining and/or reviewing separately obtained history  Performing a medically necessary appropriate examination and/or evaluation  Counseling and educating the patient/family/caregiver  Ordering medications, tests, or procedures  Referring and communicating with other health care professionals as needed  Documenting clinical information in the electronic or other health record  Independently interpreting results (not reported separately) and communicating results to the patient/family/caregiver  Care coordination (not reported separately)    E/M time = 20 Minutes

## 2022-08-09 RX ORDER — LIDOCAINE HYDROCHLORIDE 20 MG/ML
JELLY TOPICAL ONCE
Status: CANCELLED | OUTPATIENT
Start: 2022-08-09 | End: 2022-08-09

## 2022-08-09 RX ORDER — BETAMETHASONE DIPROPIONATE 0.05 %
OINTMENT (GRAM) TOPICAL ONCE
Status: CANCELLED | OUTPATIENT
Start: 2022-08-09 | End: 2022-08-09

## 2022-08-09 RX ORDER — BACITRACIN ZINC AND POLYMYXIN B SULFATE 500; 1000 [USP'U]/G; [USP'U]/G
OINTMENT TOPICAL ONCE
Status: CANCELLED | OUTPATIENT
Start: 2022-08-09 | End: 2022-08-09

## 2022-08-09 RX ORDER — BACITRACIN, NEOMYCIN, POLYMYXIN B 400; 3.5; 5 [USP'U]/G; MG/G; [USP'U]/G
OINTMENT TOPICAL ONCE
Status: CANCELLED | OUTPATIENT
Start: 2022-08-09 | End: 2022-08-09

## 2022-08-09 RX ORDER — LIDOCAINE HYDROCHLORIDE 40 MG/ML
SOLUTION TOPICAL ONCE
Status: CANCELLED | OUTPATIENT
Start: 2022-08-09 | End: 2022-08-09

## 2022-08-09 RX ORDER — GINSENG 100 MG
CAPSULE ORAL ONCE
Status: CANCELLED | OUTPATIENT
Start: 2022-08-09 | End: 2022-08-09

## 2022-08-09 RX ORDER — GENTAMICIN SULFATE 1 MG/G
OINTMENT TOPICAL ONCE
Status: CANCELLED | OUTPATIENT
Start: 2022-08-09 | End: 2022-08-09

## 2022-08-09 RX ORDER — LIDOCAINE 50 MG/G
OINTMENT TOPICAL ONCE
Status: CANCELLED | OUTPATIENT
Start: 2022-08-09 | End: 2022-08-09

## 2022-08-09 RX ORDER — LIDOCAINE 40 MG/G
CREAM TOPICAL ONCE
Status: CANCELLED | OUTPATIENT
Start: 2022-08-09 | End: 2022-08-09

## 2022-08-09 RX ORDER — CLOBETASOL PROPIONATE 0.5 MG/G
OINTMENT TOPICAL ONCE
Status: CANCELLED | OUTPATIENT
Start: 2022-08-09 | End: 2022-08-09

## 2022-08-22 ENCOUNTER — HOSPITAL ENCOUNTER (OUTPATIENT)
Dept: WOUND CARE | Age: 52
Discharge: HOME | End: 2022-08-22
Payer: MEDICARE

## 2022-08-22 VITALS
HEIGHT: 65 IN | DIASTOLIC BLOOD PRESSURE: 81 MMHG | WEIGHT: 155 LBS | SYSTOLIC BLOOD PRESSURE: 117 MMHG | TEMPERATURE: 98.5 F | HEART RATE: 83 BPM | RESPIRATION RATE: 18 BRPM | BODY MASS INDEX: 25.83 KG/M2

## 2022-08-22 DIAGNOSIS — E11.621 DIABETIC ULCER OF LEFT GREAT TOE (HCC): Primary | ICD-10-CM

## 2022-08-22 DIAGNOSIS — L97.529 DIABETIC ULCER OF LEFT GREAT TOE (HCC): Primary | ICD-10-CM

## 2022-08-22 PROCEDURE — 11055 PARING/CUTG B9 HYPRKER LES 1: CPT

## 2022-08-22 RX ORDER — LIDOCAINE HYDROCHLORIDE 20 MG/ML
JELLY TOPICAL ONCE
Status: CANCELLED | OUTPATIENT
Start: 2022-08-22 | End: 2022-08-22

## 2022-08-22 RX ORDER — LIDOCAINE 50 MG/G
OINTMENT TOPICAL ONCE
Status: CANCELLED | OUTPATIENT
Start: 2022-08-22 | End: 2022-08-22

## 2022-08-22 RX ORDER — LIDOCAINE HYDROCHLORIDE 20 MG/ML
JELLY TOPICAL ONCE
Status: COMPLETED | OUTPATIENT
Start: 2022-08-22 | End: 2022-08-22

## 2022-08-22 RX ORDER — LIDOCAINE HYDROCHLORIDE 40 MG/ML
SOLUTION TOPICAL ONCE
Status: CANCELLED | OUTPATIENT
Start: 2022-08-22 | End: 2022-08-22

## 2022-08-22 RX ORDER — GENTAMICIN SULFATE 1 MG/G
OINTMENT TOPICAL ONCE
Status: CANCELLED | OUTPATIENT
Start: 2022-08-22 | End: 2022-08-22

## 2022-08-22 RX ORDER — BACITRACIN, NEOMYCIN, POLYMYXIN B 400; 3.5; 5 [USP'U]/G; MG/G; [USP'U]/G
OINTMENT TOPICAL ONCE
Status: CANCELLED | OUTPATIENT
Start: 2022-08-22 | End: 2022-08-22

## 2022-08-22 RX ORDER — BACITRACIN ZINC AND POLYMYXIN B SULFATE 500; 1000 [USP'U]/G; [USP'U]/G
OINTMENT TOPICAL ONCE
Status: CANCELLED | OUTPATIENT
Start: 2022-08-22 | End: 2022-08-22

## 2022-08-22 RX ORDER — BETAMETHASONE DIPROPIONATE 0.05 %
OINTMENT (GRAM) TOPICAL ONCE
Status: CANCELLED | OUTPATIENT
Start: 2022-08-22 | End: 2022-08-22

## 2022-08-22 RX ORDER — CLOBETASOL PROPIONATE 0.5 MG/G
OINTMENT TOPICAL ONCE
Status: CANCELLED | OUTPATIENT
Start: 2022-08-22 | End: 2022-08-22

## 2022-08-22 RX ORDER — GINSENG 100 MG
CAPSULE ORAL ONCE
Status: CANCELLED | OUTPATIENT
Start: 2022-08-22 | End: 2022-08-22

## 2022-08-22 RX ORDER — LIDOCAINE 40 MG/G
CREAM TOPICAL ONCE
Status: CANCELLED | OUTPATIENT
Start: 2022-08-22 | End: 2022-08-22

## 2022-08-22 RX ADMIN — LIDOCAINE HYDROCHLORIDE: 20 JELLY TOPICAL at 08:51

## 2022-08-22 NOTE — PROGRESS NOTES
[address] and [address] [facility]   Medical Staff Progress Note     Reyna Harned  MEDICAL RECORD NUMBER:  [id number]  AGE: 45 y.o. GENDER: male  : 1970  EPISODE DATE:  2022    Chief complaint and reason for visit:     Chief Complaint   Patient presents with    Wound Check     Left great toe wound      Patient presenting for follow up evaluation of wound(s) per chief complaint. Subjective: Symptoms, wound related issues, or other pertinent wound history since last visit: Patient presents today with follow-up regarding chronic wound to left great toe this now just callus area. Patient has no new complaints. Medical Decision Making:     Problem List Items Addressed This Visit          Endocrine    Diabetic ulcer of left great toe ([facility] - Primary    Relevant Orders    Initiate Outpatient Wound Care Protocol       Wounds and Treatment Plan:      Other associated diagnoses or problems addressed:  None    Pertinent imaging reviewed including independent interpretation include:  X-ray reviewed which showed previous wound but no signs of infection and bone. Pertinent labs reviewed. Medical records and review of external note (s) from other providers done as well. New lab or imaging orders no new orders    Prescription drug management: N/A     Discussion of management or test interpretation with Pt and staff. Comorbid conditions affecting wound healing: As per PMH which was reviewed. Risk of complications and/or mortality of patient management: This patient has a low risk of morbidity and mortality from additional diagnostic testing or treatment. This is due to the above conditions affecting wound healing as well as patient and procedure risk factors. Education and discussion held with patient regarding these disease processes pertinent to wound(s). Other pertinent decisions include: N/A .   The patient's diagnosis or treatment is not significantly limited by social determinants of health as noted by: resource limitations. Wound 06/07/22 Toe (Comment  which one) Anterior; Left right great toe- (Active)   Wound Image   08/22/22 0846   Wound Etiology Diabetic Parra 2 08/22/22 0846   Dressing Status Dry 08/22/22 0846   Wound Cleansed Cleansed with saline 08/22/22 0846   Wound Length (cm) 0 cm 08/22/22 0846   Wound Width (cm) 0 cm 08/22/22 0846   Wound Depth (cm) 0 cm 08/22/22 0846   Wound Surface Area (cm^2) 0 cm^2 08/22/22 0846   Change in Wound Size % (l*w) 100 08/22/22 0846   Wound Volume (cm^3) 0 cm^3 08/22/22 0846   Wound Healing % 100 08/22/22 0846   Wound Assessment Epithelialization 08/22/22 0846   Drainage Amount None 08/22/22 0846   Drainage Description Serous 08/08/22 0855   Odor None 08/22/22 0846   Helene-wound Assessment Hyperkeratosis (callous) 08/22/22 0846   Margins Attached edges 07/20/22 0813   Wound Thickness Description not for Pressure Injury Full thickness 08/08/22 0855   Number of days: 75          Procedures done during this encounter:   Debridement: Callus removal  Indications:  Based on my examination of this patient's wound(s)/ulcer(s) today, debridement is not required to promote healing and evaluate the wound base. Risks and benefits discussed with patient who has agreed to proceed. Performed by: Abundio Marshall DO  Consent obtained:  Yes  Time out taken:  Yes  Pain Control:     Using curette the wound(s)/ulcer(s) was/were debrided down through and including the removal of callus removal.      Devitalized Tissue Debrided:  callus  Pre Debridement Measurements:  Are located in the Wound/Ulcer Documentation Flow Sheet  Wound/Ulcer #: 1  Post Debridement Measurements:  Wound/Ulcer Descriptions are Pre Debridement except measurements:   Total Surface Area Debrided: Callus removed no open wound diabetic/Pressure/Non Pressure Ulcers only:  Ulcer: Callus paring  Estimated Blood Loss:  None  Hemostasis Achieved:  not needed  Procedural Pain:  0  / 10   Post Procedural Pain:  0 / 10   Response to treatment:  Well tolerated by patient. TIME: E/M Time spent with patient and/or patient care issues: [] 15-20 min  [] 21-30 min  [] 31-44 min  [] 45 min or more. This is above the usual time needed to address patient's chief complaint today: [] Yes  [] No  This time includes physician non-face-to-face service time visit on the date of service such as  Preparing to see the patient (eg, review of tests)  Obtaining and/or reviewing separately obtained history  Performing a medically necessary appropriate examination and/or evaluation  Counseling and educating the patient/family/caregiver  Ordering medications, tests, or procedures  Referring and communicating with other health care professionals as needed  Documenting clinical information in the electronic or other health record  Independently interpreting results (not reported separately) and communicating results to the patient/family/caregiver  Care coordination (not reported separately)    HISTORY of PRESENT ILLNESS BUDDY Bravo is a 45 y.o. male who presents today for wound/ulcer evaluation. History of Wound Context: Per original history and physical on this patient. Changes in history since last evaluation: Patient's wound is healed. Callus was just formed and this was pared off today. No wound noted. Patient doing well. Objective:    /81   Pulse 83   Temp 98.5 °F (36.9 °C) (Oral)   Resp 18   Ht 5' 5\" (1.651 m)   Wt 155 lb (70.3 kg)   BMI 25.79 kg/m²   Wt Readings from Last 3 Encounters:   08/22/22 155 lb (70.3 kg)   08/08/22 154 lb 3.2 oz (69.9 kg)   07/20/22 155 lb 6.4 oz (70.5 kg)       PHYSICAL EXAM  General: Alert and in no acute distress. Normal appearing  Skin: Warm and dry, no rash  Head: Normocephalic and atraumatic  Eyes: Extraocular eye movements intact, conjunctivae normal, and sclera anicteric  ENT: Hearing grossly normal bilaterally. Normal appearance  Respiratory: no chest wall tenderness. no respiratory distress  GI: Abdomen non-tender and benign  Musculoskeletal: Baseline range of motion in joints. Nontender calves. No cyanosis. Edema negative. Neurologic: Speech normal. At baseline without new focal deficits. Mental status normal or at baseline    PAST MEDICAL HISTORY        Diagnosis Date    Autoimmune disease ([facility]     HIV    Diabetes ([facility]     Hypertension     Infectious disease     HIV    Paranoid schizophrenia ([facility]     Psychiatric disorder     bipolar, paranoid,     Seizures ([facility]        PAST SURGICAL HISTORY    Past Surgical History:   Procedure Laterality Date    AK ABDOMEN SURGERY PROC UNLISTED      hernia repair       FAMILY HISTORY    History reviewed. No pertinent family history. SOCIAL HISTORY    Social History     Tobacco Use    Smoking status: Every Day     Packs/day: 1.00     Types: Cigarettes    Smokeless tobacco: Never    Tobacco comments:      \"It don't bother me\"   Substance Use Topics    Alcohol use: No    Drug use: No       ALLERGIES    Allergies   Allergen Reactions    Valproic Acid Other (See Comments)     irritability   irritability       Chlorpromazine Rash       MEDICATIONS    Current Outpatient Medications on File Prior to Encounter   Medication Sig Dispense Refill    traZODone (DESYREL) 300 MG tablet Take 300 mg by mouth daily      albuterol sulfate  (90 Base) MCG/ACT inhaler Inhale 2 puffs into the lungs every 4 hours as needed      atorvastatin (LIPITOR) 10 MG tablet Take 40 mg by mouth      benztropine (COGENTIN) 2 MG tablet Take 2 mg by mouth 2 times daily      Cetirizine HCl 10 MG CAPS Take by mouth daily as needed      Cholecalciferol 50 MCG (2000 UT) TABS Take 125 mcg by mouth daily      emtricitabine-tenofovir (TRUVADA) 200-300 MG per tablet Take 1 tablet by mouth daily      insulin detemir (LEVEMIR) 100 UNIT/ML injection vial Inject 30 Units into the skin daily      nevirapine (VIRAMUNE) 200 MG tablet Take 400 mg by mouth daily      OLANZapine (ZYPREXA) 20 MG tablet Take 20 mg by mouth      OXcarbazepine (TRILEPTAL) 300 MG tablet Take 300 mg by mouth every 12 hours       No current facility-administered medications on file prior to encounter. REVIEW OF SYSTEMS  A comprehensive review of systems was negative except for what has been indicated above and: Except what is in H&P. Written patient dismissal instructions given to patient and signed by patient or POA.              Electronically signed by Severo Bills, DO on 8/22/2022 at 9:03 AM

## 2022-08-22 NOTE — WOUND CARE
Discharge Instructions for  Carri Cintron  [address] [place] [address]  [address] [facility]  Phone [phone]   Fax [phone]      NAME:  Gregor Fothergill  YOB: 1970  MEDICAL RECORD NUMBER:  [id number]  DATE:  8/22/2022    Return Appointment:   Discharge from wound center at this time      Instructions: Left great toe:  Wound is healed! Patient may shower without dressing at this time. Keep pressure off toes by wearing diabetic shoes. Do not walk barefoot. Discharge from wound center      Should you experience increased redness, swelling, pain, foul odor, size of wound(s), or have a temperature over 101 degrees please contact the [address] [place] [address] Road at [phone] or if after hours contact your primary care physician or go to the hospital emergency department. PLEASE NOTE: IF YOU ARE UNABLE TO OBTAIN WOUND SUPPLIES, CONTINUE TO USE THE SUPPLIES YOU HAVE AVAILABLE UNTIL YOU ARE ABLE TO REACH US. IT IS MOST IMPORTANT TO KEEP THE WOUND COVERED AT ALL TIMES.     Electronically signed Charle Gosselin, PT, [facility] on 8/22/2022 at 9:00 AM

## 2022-08-22 NOTE — FLOWSHEET NOTE
08/22/22 0846   Wound 06/07/22 Toe (Comment  which one) Anterior; Left right great toe-   Date First Assessed/Time First Assessed: 06/07/22 0934   Present on Hospital Admission: Yes  Wound Approximate Age at First Assessment (Weeks): 4 weeks  Primary Wound Type: (c)   Location: (c) Toe (Comment  which one)  Wound Location Orientation: Ante. ..    Wound Image    Wound Etiology Diabetic Parra 2   Dressing Status Dry   Wound Cleansed Cleansed with saline   Wound Length (cm) 0 cm   Wound Width (cm) 0 cm   Wound Depth (cm) 0 cm   Wound Surface Area (cm^2) 0 cm^2   Change in Wound Size % (l*w) 100   Wound Volume (cm^3) 0 cm^3   Wound Healing % 100   Wound Assessment Epithelialization   Drainage Amount None   Odor None   Helene-wound Assessment Hyperkeratosis (callous)   Pain Assessment   Pain Assessment None - Denies Pain

## 2022-08-22 NOTE — DISCHARGE INSTRUCTIONS
Return Appointment:   Discharge from wound center at this time        Instructions: Left great toe:  Wound is healed! Patient may shower without dressing at this time. Keep pressure off toes by wearing diabetic shoes. Do not walk barefoot. Discharge from wound center        Should you experience increased redness, swelling, pain, foul odor, size of wound(s), or have a temperature over 101 degrees please contact the [address] [place] [address] Road at [phone] or if after hours contact your primary care physician or go to the hospital emergency department. PLEASE NOTE: IF YOU ARE UNABLE TO OBTAIN WOUND SUPPLIES, CONTINUE TO USE THE SUPPLIES YOU HAVE AVAILABLE UNTIL YOU ARE ABLE TO REACH US. IT IS MOST IMPORTANT TO KEEP THE WOUND COVERED AT ALL TIMES.

## 2023-02-08 NOTE — PROGRESS NOTES
Outreached to patient for ANIKET WILLINGHAM for ER visit on 2020 for hyperglycemia   Patient has f/u apt with [facility] tomorrow   Patient contacted regarding recent discharge and COVID-19 risk   Care Transition Nurse/ [address] [place] [address] contacted the patient by telephone to perform post discharge assessment. Verified name and  with patient as identifiers. Patient has following risk factors of: HIV; DM; HTN; seizures; hx of MRSA. CTN/ACM reviewed discharge instructions, medical action plan and red flags related to discharge diagnosis. Reviewed and educated them on any new and changed medications related to discharge diagnosis. Advised obtaining a 90-day supply of all daily and as-needed medications. Education provided regarding infection prevention, and signs and symptoms of COVID-19 and when to seek medical attention with patient who verbalized understanding. Discussed exposure protocols and quarantine from [address] you at higher risk for severe illness  and given an opportunity for questions and concerns. The patient agrees to contact the COVID-19 hotline [phone] or PCP office for questions related to their healthcare. CTN/ACM provided contact information for future reference. From CDC: Are you at higher risk for severe illness?  Wash your hands often.  Avoid close contact (6 feet, which is about two arm lengths) with people who are sick.  Put distance between yourself and other people if COVID-19 is spreading in your community.  Clean and disinfect frequently touched surfaces.  Avoid all cruise travel and non-essential air travel.  Call your healthcare professional if you have concerns about COVID-19 and your underlying condition or if you are sick.     For more information on steps you can take to protect yourself, see CDC's How to Protect Yourself      Patient/family/caregiver given information for Mk Klein and agrees to enroll no  Patient's preferred RN called [facility] to see if anything is needed on our end. Samy PATEL stated that nothing else is needed.    e-mail:  n/a  Patient's preferred phone number: n/a  Based on Loop alert triggers, patient will be contacted by nurse care manager for worsening symptoms. Plan for follow-up call in 7-14 days based on severity of symptoms and risk factors.

## 2023-05-24 ENCOUNTER — HOSPITAL ENCOUNTER (EMERGENCY)
Age: 53
Discharge: HOME | End: 2023-05-24
Attending: EMERGENCY MEDICINE
Payer: MEDICARE

## 2023-05-24 ENCOUNTER — APPOINTMENT (OUTPATIENT)
Dept: GENERAL RADIOLOGY | Age: 53
End: 2023-05-24
Payer: MEDICARE

## 2023-05-24 ENCOUNTER — APPOINTMENT (OUTPATIENT)
Dept: CT IMAGING | Age: 53
End: 2023-05-24
Payer: MEDICARE

## 2023-05-24 VITALS
SYSTOLIC BLOOD PRESSURE: 178 MMHG | OXYGEN SATURATION: 99 % | HEIGHT: 65 IN | WEIGHT: 175 LBS | TEMPERATURE: 98.8 F | RESPIRATION RATE: 17 BRPM | HEART RATE: 64 BPM | BODY MASS INDEX: 29.16 KG/M2 | DIASTOLIC BLOOD PRESSURE: 94 MMHG

## 2023-05-24 DIAGNOSIS — R20.0 NUMBNESS: Primary | ICD-10-CM

## 2023-05-24 LAB
ALBUMIN SERPL-MCNC: 3.9 G/DL (ref 3.5–5)
ALBUMIN/GLOB SERPL: 1 (ref 0.4–1.6)
ALP SERPL-CCNC: 77 U/L (ref 50–136)
ALT SERPL-CCNC: 22 U/L (ref 12–65)
ANION GAP SERPL CALC-SCNC: 4 MMOL/L (ref 2–11)
AST SERPL-CCNC: 12 U/L (ref 15–37)
BASOPHILS # BLD: 0 K/UL (ref 0–0.2)
BASOPHILS NFR BLD: 1 % (ref 0–2)
BILIRUB SERPL-MCNC: 0.2 MG/DL (ref 0.2–1.1)
BUN SERPL-MCNC: 14 MG/DL (ref 6–23)
CALCIUM SERPL-MCNC: 9.7 MG/DL (ref 8.3–10.4)
CHLORIDE SERPL-SCNC: 108 MMOL/L (ref 101–110)
CO2 SERPL-SCNC: 25 MMOL/L (ref 21–32)
CREAT SERPL-MCNC: 0.92 MG/DL (ref 0.8–1.5)
DIFFERENTIAL METHOD BLD: ABNORMAL
EOSINOPHIL # BLD: 0.1 K/UL (ref 0–0.8)
EOSINOPHIL NFR BLD: 1 % (ref 0.5–7.8)
ERYTHROCYTE [DISTWIDTH] IN BLOOD BY AUTOMATED COUNT: 13.7 % (ref 11.9–14.6)
GLOBULIN SER CALC-MCNC: 4 G/DL (ref 2.8–4.5)
GLUCOSE SERPL-MCNC: 222 MG/DL (ref 65–100)
HCT VFR BLD AUTO: 47.6 % (ref 41.1–50.3)
HGB BLD-MCNC: 15.2 G/DL (ref 13.6–17.2)
IMM GRANULOCYTES # BLD AUTO: 0 K/UL (ref 0–0.5)
IMM GRANULOCYTES NFR BLD AUTO: 1 % (ref 0–5)
LYMPHOCYTES # BLD: 2.6 K/UL (ref 0.5–4.6)
LYMPHOCYTES NFR BLD: 30 % (ref 13–44)
MCH RBC QN AUTO: 27.5 PG (ref 26.1–32.9)
MCHC RBC AUTO-ENTMCNC: 31.9 G/DL (ref 31.4–35)
MCV RBC AUTO: 86.1 FL (ref 82–102)
MONOCYTES # BLD: 0.7 K/UL (ref 0.1–1.3)
MONOCYTES NFR BLD: 8 % (ref 4–12)
NEUTS SEG # BLD: 5.3 K/UL (ref 1.7–8.2)
NEUTS SEG NFR BLD: 61 % (ref 43–78)
NRBC # BLD: 0 K/UL (ref 0–0.2)
PLATELET # BLD AUTO: 214 K/UL (ref 150–450)
PMV BLD AUTO: 9.1 FL (ref 9.4–12.3)
POTASSIUM SERPL-SCNC: 4.2 MMOL/L (ref 3.5–5.1)
PROT SERPL-MCNC: 7.9 G/DL (ref 6.3–8.2)
RBC # BLD AUTO: 5.53 M/UL (ref 4.23–5.6)
SODIUM SERPL-SCNC: 137 MMOL/L (ref 133–143)
WBC # BLD AUTO: 8.7 K/UL (ref 4.3–11.1)

## 2023-05-24 PROCEDURE — 99285 EMERGENCY DEPT VISIT HI MDM: CPT

## 2023-05-24 PROCEDURE — 2580000003 HC RX 258: Performed by: EMERGENCY MEDICINE

## 2023-05-24 PROCEDURE — 71045 X-RAY EXAM CHEST 1 VIEW: CPT

## 2023-05-24 PROCEDURE — 93005 ELECTROCARDIOGRAM TRACING: CPT | Performed by: EMERGENCY MEDICINE

## 2023-05-24 PROCEDURE — 80053 COMPREHEN METABOLIC PANEL: CPT

## 2023-05-24 PROCEDURE — 70450 CT HEAD/BRAIN W/O DYE: CPT

## 2023-05-24 PROCEDURE — 85025 COMPLETE CBC W/AUTO DIFF WBC: CPT

## 2023-05-24 RX ORDER — 0.9 % SODIUM CHLORIDE 0.9 %
1000 INTRAVENOUS SOLUTION INTRAVENOUS
Status: COMPLETED | OUTPATIENT
Start: 2023-05-24 | End: 2023-05-24

## 2023-05-24 RX ADMIN — SODIUM CHLORIDE 1000 ML: 9 INJECTION, SOLUTION INTRAVENOUS at 15:16

## 2023-05-24 ASSESSMENT — ENCOUNTER SYMPTOMS
VOMITING: 0
SHORTNESS OF BREATH: 0
NAUSEA: 0
ABDOMINAL PAIN: 0
CONSTIPATION: 0
DIARRHEA: 0
COUGH: 0
RHINORRHEA: 0
SORE THROAT: 0
BACK PAIN: 0
COLOR CHANGE: 0

## 2023-05-24 ASSESSMENT — PAIN DESCRIPTION - DESCRIPTORS: DESCRIPTORS: NUMBNESS

## 2023-05-24 ASSESSMENT — LIFESTYLE VARIABLES
HOW OFTEN DO YOU HAVE A DRINK CONTAINING ALCOHOL: NEVER
HOW MANY STANDARD DRINKS CONTAINING ALCOHOL DO YOU HAVE ON A TYPICAL DAY: PATIENT DOES NOT DRINK

## 2023-05-24 ASSESSMENT — PAIN DESCRIPTION - ORIENTATION: ORIENTATION: RIGHT;LEFT

## 2023-05-24 ASSESSMENT — PAIN SCALES - GENERAL
PAINLEVEL_OUTOF10: 6
PAINLEVEL_OUTOF10: 6

## 2023-05-24 ASSESSMENT — PAIN DESCRIPTION - LOCATION: LOCATION: ARM

## 2023-05-24 ASSESSMENT — PAIN - FUNCTIONAL ASSESSMENT: PAIN_FUNCTIONAL_ASSESSMENT: 0-10

## 2023-05-24 NOTE — ED PROVIDER NOTES
intact. Pupils: Pupils are equal, round, and reactive to light. Cardiovascular:      Rate and Rhythm: Normal rate and regular rhythm. Pulmonary:      Effort: Pulmonary effort is normal.      Breath sounds: Normal breath sounds. No wheezing. Abdominal:      General: Bowel sounds are normal.      Palpations: Abdomen is soft. Tenderness: There is no abdominal tenderness. Musculoskeletal:         General: No swelling. Normal range of motion. Cervical back: Normal range of motion. No tenderness. Skin:     General: Skin is warm and dry. Neurological:      General: No focal deficit present. Mental Status: He is alert and oriented to person, place, and time. Cranial Nerves: No cranial nerve deficit. Motor: No weakness. Procedures     Procedures    Orders Placed This Encounter   Procedures    XR CHEST PORTABLE    CT HEAD WO CONTRAST    CBC with Auto Differential    Comprehensive Metabolic Panel    EKG 12 Lead        Medications   0.9 % sodium chloride bolus (1,000 mLs IntraVENous New Bag 5/24/23 1516)       New Prescriptions    No medications on file        Past Medical History:   Diagnosis Date    Autoimmune disease ([facility] [address]     HIV    Diabetes ([facility]     Hypertension     Infectious disease     HIV    Paranoid schizophrenia ([facility] [address]     Psychiatric disorder     bipolar, paranoid,     Seizures ([facility] [address] [facility]         Past Surgical History:   Procedure Laterality Date    [place] UNLISTED PROCEDURE ABDOMEN PERITONEUM & OMENTUM      hernia repair        Social History     Socioeconomic History    Marital status: Single     Spouse name: None    Number of children: None    Years of education: None    Highest education level: None   Tobacco Use    Smoking status: Every Day     Packs/day: 1.00     Types: Cigarettes    Smokeless tobacco: Never    Tobacco comments:      \"It don't bother me\"   Substance and Sexual Activity    Alcohol use: No    Drug use: No        Previous Medications    ALBUTEROL SULFATE HFA

## 2023-05-24 NOTE — ED TRIAGE NOTES
Pt c/o bilateral arm numbness for over a week. States that he was seen and treated at [facility] and dx with a \"mild stroke\".  Continues to have numbness in both arms

## 2023-05-24 NOTE — ED NOTES
I have reviewed discharge instructions with the patient. The patient verbalized understanding. Patient left ED via Discharge Method: ambulatory to Home with self. Opportunity for questions and clarification provided. Patient given 0 scripts. To continue your aftercare when you leave the hospital, you may receive an automated call from our care team to check in on how you are doing. This is a free service and part of our promise to provide the best care and service to meet your aftercare needs.  If you have questions, or wish to unsubscribe from this service please call [phone]. Thank you for Choosing our [facility] Emergency Department.         Loerna Massey RN  05/24/23 6232

## 2023-05-25 LAB
EKG ATRIAL RATE: 86 BPM
EKG DIAGNOSIS: NORMAL
EKG P AXIS: 68 DEGREES
EKG P-R INTERVAL: 142 MS
EKG Q-T INTERVAL: 350 MS
EKG QRS DURATION: 99 MS
EKG QTC CALCULATION (BAZETT): 421 MS
EKG R AXIS: 78 DEGREES
EKG T AXIS: 9 DEGREES
EKG VENTRICULAR RATE: 87 BPM

## 2023-05-25 PROCEDURE — 93010 ELECTROCARDIOGRAM REPORT: CPT | Performed by: INTERNAL MEDICINE

## 2023-08-22 ENCOUNTER — HOSPITAL ENCOUNTER (EMERGENCY)
Age: 53
Discharge: HOME | End: 2023-08-22
Attending: GENERAL PRACTICE
Payer: MEDICARE

## 2023-08-22 VITALS
SYSTOLIC BLOOD PRESSURE: 151 MMHG | HEART RATE: 82 BPM | OXYGEN SATURATION: 98 % | DIASTOLIC BLOOD PRESSURE: 85 MMHG | RESPIRATION RATE: 18 BRPM | BODY MASS INDEX: 24.99 KG/M2 | HEIGHT: 65 IN | TEMPERATURE: 97.7 F | WEIGHT: 150 LBS

## 2023-08-22 DIAGNOSIS — E11.69 TYPE 2 DIABETES MELLITUS WITH OTHER SPECIFIED COMPLICATION, UNSPECIFIED WHETHER LONG TERM INSULIN USE (HCC): ICD-10-CM

## 2023-08-22 DIAGNOSIS — F41.1 ANXIETY STATE: Primary | ICD-10-CM

## 2023-08-22 LAB
ALBUMIN SERPL-MCNC: 4.1 G/DL (ref 3.5–5)
ALBUMIN/GLOB SERPL: 1.2 (ref 0.4–1.6)
ALP SERPL-CCNC: 53 U/L (ref 50–136)
ALT SERPL-CCNC: 26 U/L (ref 12–65)
ANION GAP SERPL CALC-SCNC: 6 MMOL/L (ref 2–11)
AST SERPL-CCNC: 13 U/L (ref 15–37)
BASOPHILS # BLD: 0 K/UL (ref 0–0.2)
BASOPHILS NFR BLD: 1 % (ref 0–2)
BILIRUB SERPL-MCNC: 0.4 MG/DL (ref 0.2–1.1)
BUN SERPL-MCNC: 17 MG/DL (ref 6–23)
CALCIUM SERPL-MCNC: 8.9 MG/DL (ref 8.3–10.4)
CHLORIDE SERPL-SCNC: 102 MMOL/L (ref 101–110)
CO2 SERPL-SCNC: 25 MMOL/L (ref 21–32)
CREAT SERPL-MCNC: 1.27 MG/DL (ref 0.8–1.5)
DIFFERENTIAL METHOD BLD: ABNORMAL
EOSINOPHIL # BLD: 0.1 K/UL (ref 0–0.8)
EOSINOPHIL NFR BLD: 1 % (ref 0.5–7.8)
ERYTHROCYTE [DISTWIDTH] IN BLOOD BY AUTOMATED COUNT: 13.5 % (ref 11.9–14.6)
GLOBULIN SER CALC-MCNC: 3.5 G/DL (ref 2.8–4.5)
GLUCOSE BLD STRIP.AUTO-MCNC: 241 MG/DL (ref 65–100)
GLUCOSE BLD STRIP.AUTO-MCNC: 323 MG/DL (ref 65–100)
GLUCOSE SERPL-MCNC: 344 MG/DL (ref 65–100)
HCT VFR BLD AUTO: 42.5 % (ref 41.1–50.3)
HGB BLD-MCNC: 14.1 G/DL (ref 13.6–17.2)
IMM GRANULOCYTES # BLD AUTO: 0 K/UL (ref 0–0.5)
IMM GRANULOCYTES NFR BLD AUTO: 0 % (ref 0–5)
LYMPHOCYTES # BLD: 1.4 K/UL (ref 0.5–4.6)
LYMPHOCYTES NFR BLD: 19 % (ref 13–44)
MCH RBC QN AUTO: 26.8 PG (ref 26.1–32.9)
MCHC RBC AUTO-ENTMCNC: 33.2 G/DL (ref 31.4–35)
MCV RBC AUTO: 80.6 FL (ref 82–102)
MONOCYTES # BLD: 0.6 K/UL (ref 0.1–1.3)
MONOCYTES NFR BLD: 8 % (ref 4–12)
NEUTS SEG # BLD: 5.3 K/UL (ref 1.7–8.2)
NEUTS SEG NFR BLD: 72 % (ref 43–78)
NRBC # BLD: 0 K/UL (ref 0–0.2)
PLATELET # BLD AUTO: 256 K/UL (ref 150–450)
PMV BLD AUTO: 9.3 FL (ref 9.4–12.3)
POTASSIUM SERPL-SCNC: 3.9 MMOL/L (ref 3.5–5.1)
PROT SERPL-MCNC: 7.6 G/DL (ref 6.3–8.2)
RBC # BLD AUTO: 5.27 M/UL (ref 4.23–5.6)
SERVICE CMNT-IMP: ABNORMAL
SERVICE CMNT-IMP: ABNORMAL
SODIUM SERPL-SCNC: 133 MMOL/L (ref 133–143)
WBC # BLD AUTO: 7.4 K/UL (ref 4.3–11.1)

## 2023-08-22 PROCEDURE — 85025 COMPLETE CBC W/AUTO DIFF WBC: CPT

## 2023-08-22 PROCEDURE — 99284 EMERGENCY DEPT VISIT MOD MDM: CPT

## 2023-08-22 PROCEDURE — 96360 HYDRATION IV INFUSION INIT: CPT

## 2023-08-22 PROCEDURE — 6370000000 HC RX 637 (ALT 250 FOR IP): Performed by: PHYSICIAN ASSISTANT

## 2023-08-22 PROCEDURE — 80053 COMPREHEN METABOLIC PANEL: CPT

## 2023-08-22 PROCEDURE — 82962 GLUCOSE BLOOD TEST: CPT

## 2023-08-22 PROCEDURE — 96361 HYDRATE IV INFUSION ADD-ON: CPT

## 2023-08-22 PROCEDURE — 2580000003 HC RX 258: Performed by: PHYSICIAN ASSISTANT

## 2023-08-22 RX ORDER — HYDROXYZINE PAMOATE 25 MG/1
25 CAPSULE ORAL 3 TIMES DAILY PRN
Qty: 21 CAPSULE | Refills: 0 | Status: SHIPPED | OUTPATIENT
Start: 2023-08-22 | End: 2023-08-29

## 2023-08-22 RX ORDER — HYDROXYZINE PAMOATE 25 MG/1
25 CAPSULE ORAL
Status: COMPLETED | OUTPATIENT
Start: 2023-08-22 | End: 2023-08-22

## 2023-08-22 RX ORDER — 0.9 % SODIUM CHLORIDE 0.9 %
1000 INTRAVENOUS SOLUTION INTRAVENOUS
Status: COMPLETED | OUTPATIENT
Start: 2023-08-22 | End: 2023-08-22

## 2023-08-22 RX ADMIN — SODIUM CHLORIDE 1000 ML: 9 INJECTION, SOLUTION INTRAVENOUS at 15:39

## 2023-08-22 RX ADMIN — HYDROXYZINE PAMOATE 25 MG: 25 CAPSULE ORAL at 14:43

## 2023-08-22 ASSESSMENT — LIFESTYLE VARIABLES: HOW OFTEN DO YOU HAVE A DRINK CONTAINING ALCOHOL: NEVER

## 2023-08-22 ASSESSMENT — PAIN - FUNCTIONAL ASSESSMENT
PAIN_FUNCTIONAL_ASSESSMENT: NONE - DENIES PAIN
PAIN_FUNCTIONAL_ASSESSMENT: NONE - DENIES PAIN

## 2023-08-22 NOTE — DISCHARGE INSTRUCTIONS
Use vistaril as needed for anxiety attack. Follow-up with your PCP in 1 to 2 days if no improvement. Return to the ER for any new or worsening symptoms.

## 2023-08-22 NOTE — ED PROVIDER NOTES
mL (1,000 mLs IntraVENous New Bag 8/22/23 1539)   hydrOXYzine pamoate (VISTARIL) capsule 25 mg (25 mg Oral Given 8/22/23 1443)       New Prescriptions    HYDROXYZINE PAMOATE (VISTARIL) 25 MG CAPSULE    Take 1 capsule by mouth 3 times daily as needed for Anxiety        Past Medical History:   Diagnosis Date    Autoimmune disease ([address] [facility])     HIV    Diabetes ([address] [facility])     Hypertension     Infectious disease     HIV    Paranoid schizophrenia ([facility])     Psychiatric disorder     bipolar, paranoid,     Seizures ([facility])         Past Surgical History:   Procedure Laterality Date    [place] UNLISTED PROCEDURE ABDOMEN PERITONEUM & OMENTUM      hernia repair        Results for orders placed or performed during the hospital encounter of 08/22/23   CBC with Auto Differential   Result Value Ref Range    WBC 7.4 4.3 - 11.1 K/uL    RBC 5.27 4.23 - 5.6 M/uL    Hemoglobin 14.1 13.6 - 17.2 g/dL    Hematocrit 42.5 41.1 - 50.3 %    MCV 80.6 (L) 82.0 - 102.0 FL    MCH 26.8 26.1 - 32.9 PG    MCHC 33.2 31.4 - 35.0 g/dL    RDW 13.5 11.9 - 14.6 %    Platelets 829 182 - 444 K/uL    MPV 9.3 (L) 9.4 - 12.3 FL    nRBC 0.00 0.0 - 0.2 K/uL    Differential Type AUTOMATED      Neutrophils % 72 43 - 78 %    Lymphocytes % 19 13 - 44 %    Monocytes % 8 4.0 - 12.0 %    Eosinophils % 1 0.5 - 7.8 %    Basophils % 1 0.0 - 2.0 %    Immature Granulocytes 0 0.0 - 5.0 %    Neutrophils Absolute 5.3 1.7 - 8.2 K/UL    Lymphocytes Absolute 1.4 0.5 - 4.6 K/UL    Monocytes Absolute 0.6 0.1 - 1.3 K/UL    Eosinophils Absolute 0.1 0.0 - 0.8 K/UL    Basophils Absolute 0.0 0.0 - 0.2 K/UL    Absolute Immature Granulocyte 0.0 0.0 - 0.5 K/UL   CMP   Result Value Ref Range    Sodium 133 133 - 143 mmol/L    Potassium 3.9 3.5 - 5.1 mmol/L    Chloride 102 101 - 110 mmol/L    CO2 25 21 - 32 mmol/L    Anion Gap 6 2 - 11 mmol/L    Glucose 344 (H) 65 - 100 mg/dL    BUN 17 6 - 23 MG/DL    Creatinine 1.27 0.8 - 1.5 MG/DL    Est, Glom Filt Rate >60 >60 ml/min/1.73m2    Calcium 8.9 8.3 - 10.4 MG/DL

## 2024-09-11 ENCOUNTER — HOSPITAL ENCOUNTER (EMERGENCY)
Age: 54
Discharge: HOME | End: 2024-09-12
Attending: STUDENT IN AN ORGANIZED HEALTH CARE EDUCATION/TRAINING PROGRAM
Payer: MEDICARE

## 2024-09-11 DIAGNOSIS — Z59.9 INABILITY TO RETURN TO LIVING SITUATION: Primary | ICD-10-CM

## 2024-09-11 PROCEDURE — 6370000000 HC RX 637 (ALT 250 FOR IP): Performed by: STUDENT IN AN ORGANIZED HEALTH CARE EDUCATION/TRAINING PROGRAM

## 2024-09-11 PROCEDURE — 99283 EMERGENCY DEPT VISIT LOW MDM: CPT

## 2024-09-11 RX ORDER — DIVALPROEX SODIUM 250 MG/1
750 TABLET, FILM COATED, EXTENDED RELEASE ORAL ONCE
Status: COMPLETED | OUTPATIENT
Start: 2024-09-11 | End: 2024-09-11

## 2024-09-11 RX ORDER — ATORVASTATIN CALCIUM 40 MG/1
40 TABLET, FILM COATED ORAL ONCE
Status: COMPLETED | OUTPATIENT
Start: 2024-09-11 | End: 2024-09-11

## 2024-09-11 RX ADMIN — DIVALPROEX SODIUM 750 MG: 250 TABLET, EXTENDED RELEASE ORAL at 19:30

## 2024-09-11 RX ADMIN — METFORMIN HYDROCHLORIDE 1000 MG: 500 TABLET ORAL at 19:30

## 2024-09-11 RX ADMIN — ATORVASTATIN CALCIUM 40 MG: 40 TABLET, FILM COATED ORAL at 19:30

## 2024-09-11 SDOH — ECONOMIC STABILITY - INCOME SECURITY: PROBLEM RELATED TO HOUSING AND ECONOMIC CIRCUMSTANCES, UNSPECIFIED: Z59.9

## 2024-09-11 ASSESSMENT — LIFESTYLE VARIABLES
HOW MANY STANDARD DRINKS CONTAINING ALCOHOL DO YOU HAVE ON A TYPICAL DAY: PATIENT DOES NOT DRINK
HOW OFTEN DO YOU HAVE A DRINK CONTAINING ALCOHOL: NEVER

## 2024-09-11 ASSESSMENT — PAIN SCALES - GENERAL: PAINLEVEL_OUTOF10: 0

## 2024-09-11 ASSESSMENT — PAIN - FUNCTIONAL ASSESSMENT: PAIN_FUNCTIONAL_ASSESSMENT: NONE - DENIES PAIN

## 2024-09-12 VITALS
HEART RATE: 73 BPM | RESPIRATION RATE: 16 BRPM | HEIGHT: 66 IN | SYSTOLIC BLOOD PRESSURE: 102 MMHG | TEMPERATURE: 97.7 F | WEIGHT: 152 LBS | OXYGEN SATURATION: 100 % | BODY MASS INDEX: 24.43 KG/M2 | DIASTOLIC BLOOD PRESSURE: 73 MMHG

## 2025-04-29 NOTE — ED NOTES
Constant Observer Yes - Name: Faraz   Constant Observer Oriented YES   High risk patients are in line of sight at all times Yes   Excess equipment/medical supplies not necessary for the care of the patient removed Yes   All sharp or dangerous objects are removed from room: including but not limited to belts, pens & pencils, needles, medications, cosmetics, lighters, matches, nail files, watches, necklaces, glass objects, razors, razor blades, knives, aerosol sprays, drawstring pants, shoes, cords (telephone, call bells, etc.) cleaning wipes or other cleaning items, aluminum cans, not permanently attached wall décor Yes   Telephone/cell phone removed as well as TV remote (batteries can be swallowed) Yes   Patient belongings removed and labeled at nurses station No - Behind Security Door   Excess linen is removed from room Yes   All plastic bags are removed from the room and replaced with paper trash bags Yes   Patient is in paper scrubs or appropriate gown and using hospital socks with rubber soles Yes   No metal, hard eating utensils or hard plates are on meal tray Yes   Remove all cleaning agents used by Sandra's Yes   If Crucifix is hanging on a nail, remove Crucifix as well as the nail Yes       *If any question above is answered \"No,\" documentation is required. Patient/spouse contacted office requesting prescriptions for nausea be sent in to pharmacy.   Prescriptions for ondansetron 8 mg and prochlorperazine 10 mg sent to [facility] pharmacy for as needed use for nausea/vomiting.  An additional Rx for loperamide 2 mg capsules sent for as needed use of diarrhea occurs.     Allergies and Medications   Allergies[1]  Current Medications[2]    Josh KamD        [1]   Allergies  Allergen Reactions    Oxaliplatin Itching   [2]   Current Outpatient Medications:     ALPRAZolam (Xanax) 0.5 mg tablet, Take 1 tablet (0.5 mg) by mouth once daily as needed for anxiety., Disp: 30 tablet, Rfl: 0    cholecalciferol (Vitamin D-3) 25 MCG (1000 UT) capsule, Take 1 capsule (25 mcg) by mouth once daily., Disp: , Rfl:     elderberry fruit 350 mg capsule, Take 1 capsule by mouth once daily., Disp: , Rfl:     flaxseed oiL 1,000 mg capsule, Take 1 capsule (1,000 mg) by mouth once daily., Disp: , Rfl:     FLUoxetine (PROzac) 20 mg capsule, Take 1 capsule (20 mg) by mouth once daily at bedtime., Disp: 90 capsule, Rfl: 1    FLUoxetine (PROzac) 40 mg capsule, TAKE 1 CAPSULE BY MOUTH ONCE DAILY, Disp: 90 capsule, Rfl: 1    lisinopril 20 mg tablet, Take 1 tablet (20 mg) by mouth once daily., Disp: 90 tablet, Rfl: 1    loperamide (Imodium) 2 mg capsule, Take 2 capsules by mouth with the first episode of diarrhea, then take 1 capsule with each episode of loose stool thereafter. Maximum of 8 capsules per 24 hours., Disp: 30 capsule, Rfl: 3    omeprazole OTC (PriLOSEC OTC) 20 mg EC tablet, Take 1 tablet (20 mg) by mouth once daily. Do not crush, chew, or split., Disp: 30 tablet, Rfl: 2    ondansetron (Zofran) 8 mg tablet, Take 1 tablet (8 mg) by mouth every 8 hours if needed for nausea., Disp: 30 tablet, Rfl: 3    prochlorperazine (Compazine) 10 mg tablet, Take 1 tablet (10 mg) by mouth every 6 hours if needed for nausea., Disp: 30 tablet, Rfl: 3    SUMAtriptan (Imitrex) 50 mg tablet, TAKE 1 TABLET  (50 MG) BY MOUTH 1 TIME IF NEEDED FOR MIGRAINE FOR UP TO 36 DOSES., Disp: 9 tablet, Rfl: 3    vitamin C-multivitamin-mineral (Emergen-C) 500 mg tablet,chewable, Chew 1 tablet once daily., Disp: , Rfl:   No current facility-administered medications for this visit.    Facility-Administered Medications Ordered in Other Visits:     heparin flush 100 unit/mL syringe 500 Units, 500 Units, intra-catheter, PRN, Nba Vargas MD, 500 Units at 10/15/24 1010

## (undated) DEVICE — GAUZE,SPONGE,4"X4",16PLY,STRL,LF,10/TRAY: Brand: MEDLINE

## (undated) DEVICE — 3M™ COBAN™ NL STERILE NON-LATEX SELF-ADHERENT WRAP, 2082S, 2 IN X 5 YD (5 CM X 4,5 M), 36 ROLLS/CASE: Brand: 3M™ COBAN™

## (undated) DEVICE — SUT CHRMC 5-0 27IN RB1 BRN --

## (undated) DEVICE — Device

## (undated) DEVICE — BANDAGE,GAUZE,CONFORMING,2"X75",STRL,LF: Brand: MEDLINE INDUSTRIES, INC.

## (undated) DEVICE — SMALL BOWL SET-LF: Brand: MEDLINE INDUSTRIES, INC.

## (undated) DEVICE — REM POLYHESIVE ADULT PATIENT RETURN ELECTRODE: Brand: VALLEYLAB

## (undated) DEVICE — DRESSING,GAUZE,XEROFORM,CURAD,1"X8",ST: Brand: CURAD

## (undated) DEVICE — TRAY PREP DRY W/ PREM GLV 2 APPL 6 SPNG 2 UNDPD 1 OVERWRAP

## (undated) DEVICE — ZIMMER® STERILE DISPOSABLE TOURNIQUET CUFF WITH PLC, DUAL PORT, SINGLE BLADDER, 18 IN. (46 CM)

## (undated) DEVICE — INTENDED FOR TISSUE SEPARATION, AND OTHER PROCEDURES THAT REQUIRE A SHARP SURGICAL BLADE TO PUNCTURE OR CUT.: Brand: BARD-PARKER SAFETY BLADES SIZE 15, STERILE

## (undated) DEVICE — DRAPE,HAND,STERILE: Brand: MEDLINE

## (undated) DEVICE — GARMENT,MEDLINE,DVT,INT,CALF,MED, GEN2: Brand: MEDLINE